# Patient Record
Sex: MALE
[De-identification: names, ages, dates, MRNs, and addresses within clinical notes are randomized per-mention and may not be internally consistent; named-entity substitution may affect disease eponyms.]

---

## 2017-02-26 ENCOUNTER — RECORDS - HEALTHEAST (OUTPATIENT)
Dept: ADMINISTRATIVE | Facility: OTHER | Age: 67
End: 2017-02-26

## 2017-02-27 ENCOUNTER — RECORDS - HEALTHEAST (OUTPATIENT)
Dept: ADMINISTRATIVE | Facility: OTHER | Age: 67
End: 2017-02-27

## 2017-09-14 ENCOUNTER — OFFICE VISIT - HEALTHEAST (OUTPATIENT)
Dept: FAMILY MEDICINE | Facility: CLINIC | Age: 67
End: 2017-09-14

## 2017-09-14 DIAGNOSIS — N40.0 BPH (BENIGN PROSTATIC HYPERTROPHY): ICD-10-CM

## 2017-09-14 DIAGNOSIS — I10 ESSENTIAL HYPERTENSION: ICD-10-CM

## 2017-09-14 DIAGNOSIS — Z12.11 SCREEN FOR COLON CANCER: ICD-10-CM

## 2017-09-14 ASSESSMENT — MIFFLIN-ST. JEOR: SCORE: 1755.56

## 2017-11-03 ENCOUNTER — COMMUNICATION - HEALTHEAST (OUTPATIENT)
Dept: FAMILY MEDICINE | Facility: CLINIC | Age: 67
End: 2017-11-03

## 2017-11-03 DIAGNOSIS — I10 ESSENTIAL HYPERTENSION WITH GOAL BLOOD PRESSURE LESS THAN 140/90: ICD-10-CM

## 2017-11-03 DIAGNOSIS — R00.0 TACHYCARDIA: ICD-10-CM

## 2018-01-02 ENCOUNTER — RECORDS - HEALTHEAST (OUTPATIENT)
Dept: ADMINISTRATIVE | Facility: OTHER | Age: 68
End: 2018-01-02

## 2018-01-04 ENCOUNTER — RECORDS - HEALTHEAST (OUTPATIENT)
Dept: ADMINISTRATIVE | Facility: OTHER | Age: 68
End: 2018-01-04

## 2018-01-16 ENCOUNTER — COMMUNICATION - HEALTHEAST (OUTPATIENT)
Dept: FAMILY MEDICINE | Facility: CLINIC | Age: 68
End: 2018-01-16

## 2018-01-16 DIAGNOSIS — R00.0 TACHYCARDIA: ICD-10-CM

## 2018-01-16 DIAGNOSIS — I10 ESSENTIAL HYPERTENSION WITH GOAL BLOOD PRESSURE LESS THAN 140/90: ICD-10-CM

## 2018-03-02 ENCOUNTER — RECORDS - HEALTHEAST (OUTPATIENT)
Dept: ADMINISTRATIVE | Facility: OTHER | Age: 68
End: 2018-03-02

## 2018-03-29 ENCOUNTER — AMBULATORY - HEALTHEAST (OUTPATIENT)
Dept: CARE COORDINATION | Facility: CLINIC | Age: 68
End: 2018-03-29

## 2018-03-29 ENCOUNTER — OFFICE VISIT - HEALTHEAST (OUTPATIENT)
Dept: FAMILY MEDICINE | Facility: CLINIC | Age: 68
End: 2018-03-29

## 2018-03-29 ENCOUNTER — COMMUNICATION - HEALTHEAST (OUTPATIENT)
Dept: FAMILY MEDICINE | Facility: CLINIC | Age: 68
End: 2018-03-29

## 2018-03-29 DIAGNOSIS — I10 ESSENTIAL HYPERTENSION WITH GOAL BLOOD PRESSURE LESS THAN 140/90: ICD-10-CM

## 2018-03-29 DIAGNOSIS — L89.90 PRESSURE ULCER: ICD-10-CM

## 2018-03-29 DIAGNOSIS — T48.5X5A RHINITIS MEDICAMENTOSA: ICD-10-CM

## 2018-03-29 DIAGNOSIS — R00.0 TACHYCARDIA: ICD-10-CM

## 2018-03-29 DIAGNOSIS — J31.0 RHINITIS MEDICAMENTOSA: ICD-10-CM

## 2018-03-29 DIAGNOSIS — F43.9 STRESS AT HOME: ICD-10-CM

## 2018-03-29 DIAGNOSIS — I10 ESSENTIAL HYPERTENSION: ICD-10-CM

## 2018-03-29 DIAGNOSIS — N39.0 UTI (URINARY TRACT INFECTION): ICD-10-CM

## 2018-03-29 LAB
ALBUMIN UR-MCNC: ABNORMAL MG/DL
APPEARANCE UR: ABNORMAL
BILIRUB UR QL STRIP: NEGATIVE
COLOR UR AUTO: YELLOW
GLUCOSE UR STRIP-MCNC: NEGATIVE MG/DL
HGB UR QL STRIP: ABNORMAL
KETONES UR STRIP-MCNC: NEGATIVE MG/DL
LEUKOCYTE ESTERASE UR QL STRIP: ABNORMAL
NITRATE UR QL: NEGATIVE
PH UR STRIP: 6 [PH] (ref 5–8)
SP GR UR STRIP: 1.02 (ref 1–1.03)
UROBILINOGEN UR STRIP-ACNC: ABNORMAL

## 2018-04-13 ENCOUNTER — COMMUNICATION - HEALTHEAST (OUTPATIENT)
Dept: FAMILY MEDICINE | Facility: CLINIC | Age: 68
End: 2018-04-13

## 2018-04-17 ENCOUNTER — COMMUNICATION - HEALTHEAST (OUTPATIENT)
Dept: FAMILY MEDICINE | Facility: CLINIC | Age: 68
End: 2018-04-17

## 2018-04-17 ENCOUNTER — OFFICE VISIT - HEALTHEAST (OUTPATIENT)
Dept: FAMILY MEDICINE | Facility: CLINIC | Age: 68
End: 2018-04-17

## 2018-04-17 DIAGNOSIS — R35.0 FREQUENCY OF URINATION: ICD-10-CM

## 2018-04-17 DIAGNOSIS — N39.0 UTI (URINARY TRACT INFECTION): ICD-10-CM

## 2018-04-17 LAB
ALBUMIN UR-MCNC: ABNORMAL MG/DL
APPEARANCE UR: CLEAR
BILIRUB UR QL STRIP: NEGATIVE
COLOR UR AUTO: YELLOW
GLUCOSE UR STRIP-MCNC: NEGATIVE MG/DL
HGB UR QL STRIP: NEGATIVE
KETONES UR STRIP-MCNC: NEGATIVE MG/DL
LEUKOCYTE ESTERASE UR QL STRIP: NEGATIVE
NITRATE UR QL: NEGATIVE
PH UR STRIP: 6 [PH] (ref 5–8)
SP GR UR STRIP: >=1.03 (ref 1–1.03)
UROBILINOGEN UR STRIP-ACNC: ABNORMAL

## 2018-04-17 ASSESSMENT — MIFFLIN-ST. JEOR: SCORE: 1577.81

## 2018-05-25 ENCOUNTER — RECORDS - HEALTHEAST (OUTPATIENT)
Dept: ADMINISTRATIVE | Facility: OTHER | Age: 68
End: 2018-05-25

## 2018-06-01 ENCOUNTER — COMMUNICATION - HEALTHEAST (OUTPATIENT)
Dept: FAMILY MEDICINE | Facility: CLINIC | Age: 68
End: 2018-06-01

## 2018-06-01 DIAGNOSIS — R00.0 TACHYCARDIA: ICD-10-CM

## 2018-06-01 DIAGNOSIS — I10 ESSENTIAL HYPERTENSION WITH GOAL BLOOD PRESSURE LESS THAN 140/90: ICD-10-CM

## 2018-06-14 ENCOUNTER — COMMUNICATION - HEALTHEAST (OUTPATIENT)
Dept: FAMILY MEDICINE | Facility: CLINIC | Age: 68
End: 2018-06-14

## 2018-08-31 ENCOUNTER — RECORDS - HEALTHEAST (OUTPATIENT)
Dept: ADMINISTRATIVE | Facility: OTHER | Age: 68
End: 2018-08-31

## 2018-12-11 ENCOUNTER — COMMUNICATION - HEALTHEAST (OUTPATIENT)
Dept: FAMILY MEDICINE | Facility: CLINIC | Age: 68
End: 2018-12-11

## 2018-12-12 ENCOUNTER — OFFICE VISIT - HEALTHEAST (OUTPATIENT)
Dept: FAMILY MEDICINE | Facility: CLINIC | Age: 68
End: 2018-12-12

## 2018-12-12 DIAGNOSIS — R82.90 FOUL SMELLING URINE: ICD-10-CM

## 2018-12-12 DIAGNOSIS — N30.01 ACUTE CYSTITIS WITH HEMATURIA: ICD-10-CM

## 2018-12-12 DIAGNOSIS — I10 ESSENTIAL HYPERTENSION: ICD-10-CM

## 2018-12-12 LAB
ALBUMIN UR-MCNC: ABNORMAL MG/DL
APPEARANCE UR: ABNORMAL
BILIRUB UR QL STRIP: NEGATIVE
COLOR UR AUTO: YELLOW
GLUCOSE UR STRIP-MCNC: NEGATIVE MG/DL
HGB UR QL STRIP: ABNORMAL
KETONES UR STRIP-MCNC: ABNORMAL MG/DL
LEUKOCYTE ESTERASE UR QL STRIP: ABNORMAL
NITRATE UR QL: NEGATIVE
PH UR STRIP: 7.5 [PH] (ref 5–8)
SP GR UR STRIP: 1.02 (ref 1–1.03)
UROBILINOGEN UR STRIP-ACNC: ABNORMAL

## 2018-12-15 LAB — BACTERIA SPEC CULT: NORMAL

## 2018-12-18 ENCOUNTER — COMMUNICATION - HEALTHEAST (OUTPATIENT)
Dept: LAB | Facility: CLINIC | Age: 68
End: 2018-12-18

## 2019-01-03 ENCOUNTER — COMMUNICATION - HEALTHEAST (OUTPATIENT)
Dept: FAMILY MEDICINE | Facility: CLINIC | Age: 69
End: 2019-01-03

## 2019-01-04 ENCOUNTER — COMMUNICATION - HEALTHEAST (OUTPATIENT)
Dept: FAMILY MEDICINE | Facility: CLINIC | Age: 69
End: 2019-01-04

## 2019-02-09 ENCOUNTER — COMMUNICATION - HEALTHEAST (OUTPATIENT)
Dept: FAMILY MEDICINE | Facility: CLINIC | Age: 69
End: 2019-02-09

## 2019-03-08 ENCOUNTER — COMMUNICATION - HEALTHEAST (OUTPATIENT)
Dept: SCHEDULING | Facility: CLINIC | Age: 69
End: 2019-03-08

## 2019-05-13 ENCOUNTER — COMMUNICATION - HEALTHEAST (OUTPATIENT)
Dept: SCHEDULING | Facility: CLINIC | Age: 69
End: 2019-05-13

## 2019-09-03 ENCOUNTER — OFFICE VISIT - HEALTHEAST (OUTPATIENT)
Dept: GERIATRICS | Facility: CLINIC | Age: 69
End: 2019-09-03

## 2019-09-03 DIAGNOSIS — F41.9 ANXIETY: ICD-10-CM

## 2019-09-03 DIAGNOSIS — N13.9 URINARY OBSTRUCTION: ICD-10-CM

## 2019-09-03 DIAGNOSIS — G89.29 CHRONIC PAIN OF RIGHT KNEE: ICD-10-CM

## 2019-09-03 DIAGNOSIS — M48.061 SPINAL STENOSIS OF LUMBAR REGION WITHOUT NEUROGENIC CLAUDICATION: ICD-10-CM

## 2019-09-03 DIAGNOSIS — R33.9 URINARY RETENTION: ICD-10-CM

## 2019-09-03 DIAGNOSIS — N30.00 ACUTE CYSTITIS WITHOUT HEMATURIA: ICD-10-CM

## 2019-09-03 DIAGNOSIS — M25.561 CHRONIC PAIN OF RIGHT KNEE: ICD-10-CM

## 2019-09-04 ENCOUNTER — OFFICE VISIT - HEALTHEAST (OUTPATIENT)
Dept: GERIATRICS | Facility: CLINIC | Age: 69
End: 2019-09-04

## 2019-09-04 DIAGNOSIS — M25.559 PAIN IN JOINT INVOLVING PELVIC REGION AND THIGH, UNSPECIFIED LATERALITY: ICD-10-CM

## 2019-09-04 DIAGNOSIS — N13.9 URINARY OBSTRUCTION: ICD-10-CM

## 2019-09-05 ENCOUNTER — OFFICE VISIT - HEALTHEAST (OUTPATIENT)
Dept: GERIATRICS | Facility: CLINIC | Age: 69
End: 2019-09-05

## 2019-09-05 ENCOUNTER — RECORDS - HEALTHEAST (OUTPATIENT)
Dept: LAB | Facility: CLINIC | Age: 69
End: 2019-09-05

## 2019-09-05 DIAGNOSIS — N13.9 URINARY OBSTRUCTION: ICD-10-CM

## 2019-09-05 DIAGNOSIS — R33.9 URINARY RETENTION: ICD-10-CM

## 2019-09-05 DIAGNOSIS — M48.00 SPINAL STENOSIS, UNSPECIFIED SPINAL REGION: ICD-10-CM

## 2019-09-06 ENCOUNTER — COMMUNICATION - HEALTHEAST (OUTPATIENT)
Dept: GERIATRICS | Facility: CLINIC | Age: 69
End: 2019-09-06

## 2019-09-06 LAB
ANION GAP SERPL CALCULATED.3IONS-SCNC: 7 MMOL/L (ref 5–18)
BUN SERPL-MCNC: 19 MG/DL (ref 8–22)
CALCIUM SERPL-MCNC: 9.2 MG/DL (ref 8.5–10.5)
CHLORIDE BLD-SCNC: 105 MMOL/L (ref 98–107)
CO2 SERPL-SCNC: 26 MMOL/L (ref 22–31)
CREAT SERPL-MCNC: 0.64 MG/DL (ref 0.7–1.3)
ERYTHROCYTE [DISTWIDTH] IN BLOOD BY AUTOMATED COUNT: 15.3 % (ref 11–14.5)
GFR SERPL CREATININE-BSD FRML MDRD: >60 ML/MIN/1.73M2
GLUCOSE BLD-MCNC: 102 MG/DL (ref 70–125)
HCT VFR BLD AUTO: 34 % (ref 40–54)
HGB BLD-MCNC: 10.9 G/DL (ref 14–18)
MAGNESIUM SERPL-MCNC: 1.7 MG/DL (ref 1.8–2.6)
MCH RBC QN AUTO: 26.5 PG (ref 27–34)
MCHC RBC AUTO-ENTMCNC: 32.1 G/DL (ref 32–36)
MCV RBC AUTO: 83 FL (ref 80–100)
PLATELET # BLD AUTO: 244 THOU/UL (ref 140–440)
PMV BLD AUTO: 10.5 FL (ref 8.5–12.5)
POTASSIUM BLD-SCNC: 3.9 MMOL/L (ref 3.5–5)
RBC # BLD AUTO: 4.11 MILL/UL (ref 4.4–6.2)
SODIUM SERPL-SCNC: 138 MMOL/L (ref 136–145)
WBC: 8 THOU/UL (ref 4–11)

## 2019-09-06 RX ORDER — MAGNESIUM OXIDE 400 MG/1
400 TABLET ORAL 2 TIMES DAILY
Status: SHIPPED | COMMUNITY
Start: 2019-09-06

## 2019-09-10 ENCOUNTER — OFFICE VISIT - HEALTHEAST (OUTPATIENT)
Dept: GERIATRICS | Facility: CLINIC | Age: 69
End: 2019-09-10

## 2019-09-10 DIAGNOSIS — G89.29 CHRONIC PAIN OF RIGHT KNEE: ICD-10-CM

## 2019-09-10 DIAGNOSIS — M79.10 MYALGIA: ICD-10-CM

## 2019-09-10 DIAGNOSIS — M48.00 SPINAL STENOSIS, UNSPECIFIED SPINAL REGION: ICD-10-CM

## 2019-09-10 DIAGNOSIS — R33.9 URINARY RETENTION: ICD-10-CM

## 2019-09-10 DIAGNOSIS — M25.561 CHRONIC PAIN OF RIGHT KNEE: ICD-10-CM

## 2019-09-11 ENCOUNTER — RECORDS - HEALTHEAST (OUTPATIENT)
Dept: LAB | Facility: CLINIC | Age: 69
End: 2019-09-11

## 2019-09-11 LAB
BACTERIA #/AREA URNS HPF: ABNORMAL HPF
CK SERPL-CCNC: 129 U/L (ref 30–190)
CREAT SERPL-MCNC: 0.7 MG/DL (ref 0.7–1.3)
GFR SERPL CREATININE-BSD FRML MDRD: >60 ML/MIN/1.73M2
RBC #/AREA URNS AUTO: >100 HPF
SQUAMOUS #/AREA URNS AUTO: ABNORMAL LPF
WBC #/AREA URNS AUTO: ABNORMAL HPF

## 2019-09-12 ENCOUNTER — DOCUMENTATION ONLY (OUTPATIENT)
Dept: OTHER | Facility: CLINIC | Age: 69
End: 2019-09-12

## 2019-09-12 ENCOUNTER — AMBULATORY - HEALTHEAST (OUTPATIENT)
Dept: OTHER | Facility: CLINIC | Age: 69
End: 2019-09-12

## 2019-09-12 ENCOUNTER — OFFICE VISIT - HEALTHEAST (OUTPATIENT)
Dept: GERIATRICS | Facility: CLINIC | Age: 69
End: 2019-09-12

## 2019-09-12 DIAGNOSIS — M79.10 MYALGIA: ICD-10-CM

## 2019-09-12 DIAGNOSIS — M48.00 SPINAL STENOSIS, UNSPECIFIED SPINAL REGION: ICD-10-CM

## 2019-09-12 DIAGNOSIS — R33.9 URINARY RETENTION: ICD-10-CM

## 2019-09-12 DIAGNOSIS — M25.559 PAIN IN JOINT INVOLVING PELVIC REGION AND THIGH, UNSPECIFIED LATERALITY: ICD-10-CM

## 2019-09-12 DIAGNOSIS — M25.561 CHRONIC PAIN OF RIGHT KNEE: ICD-10-CM

## 2019-09-12 DIAGNOSIS — G89.29 CHRONIC PAIN OF RIGHT KNEE: ICD-10-CM

## 2019-09-12 DIAGNOSIS — F41.9 ANXIETY: ICD-10-CM

## 2019-09-12 LAB — BACTERIA SPEC CULT: ABNORMAL

## 2019-09-17 ENCOUNTER — OFFICE VISIT - HEALTHEAST (OUTPATIENT)
Dept: GERIATRICS | Facility: CLINIC | Age: 69
End: 2019-09-17

## 2019-09-17 DIAGNOSIS — A41.9 SEPSIS DUE TO URINARY TRACT INFECTION (H): ICD-10-CM

## 2019-09-17 DIAGNOSIS — R53.81 DEBILITATED PATIENT: ICD-10-CM

## 2019-09-17 DIAGNOSIS — R33.9 URINARY RETENTION: ICD-10-CM

## 2019-09-17 DIAGNOSIS — N39.0 SEPSIS DUE TO URINARY TRACT INFECTION (H): ICD-10-CM

## 2019-09-19 ENCOUNTER — OFFICE VISIT - HEALTHEAST (OUTPATIENT)
Dept: GERIATRICS | Facility: CLINIC | Age: 69
End: 2019-09-19

## 2019-09-19 DIAGNOSIS — M48.061 SPINAL STENOSIS OF LUMBAR REGION WITHOUT NEUROGENIC CLAUDICATION: ICD-10-CM

## 2019-09-19 DIAGNOSIS — R53.81 DEBILITATED PATIENT: ICD-10-CM

## 2019-09-19 DIAGNOSIS — M48.00 SPINAL STENOSIS, UNSPECIFIED SPINAL REGION: ICD-10-CM

## 2019-09-19 DIAGNOSIS — G89.29 CHRONIC PAIN OF RIGHT KNEE: ICD-10-CM

## 2019-09-19 DIAGNOSIS — M25.561 CHRONIC PAIN OF RIGHT KNEE: ICD-10-CM

## 2019-09-19 DIAGNOSIS — R33.9 URINARY RETENTION: ICD-10-CM

## 2019-09-20 ENCOUNTER — OFFICE VISIT - HEALTHEAST (OUTPATIENT)
Dept: GERIATRICS | Facility: CLINIC | Age: 69
End: 2019-09-20

## 2019-09-20 DIAGNOSIS — G47.00 INSOMNIA, UNSPECIFIED TYPE: ICD-10-CM

## 2019-09-20 DIAGNOSIS — R33.9 URINARY RETENTION: ICD-10-CM

## 2019-09-25 ENCOUNTER — OFFICE VISIT - HEALTHEAST (OUTPATIENT)
Dept: GERIATRICS | Facility: CLINIC | Age: 69
End: 2019-09-25

## 2019-09-25 DIAGNOSIS — R33.9 URINARY RETENTION: ICD-10-CM

## 2019-09-25 DIAGNOSIS — F41.9 ANXIETY: ICD-10-CM

## 2019-09-25 DIAGNOSIS — R15.1 FECAL SMEARING: ICD-10-CM

## 2019-09-30 ENCOUNTER — RECORDS - HEALTHEAST (OUTPATIENT)
Dept: LAB | Facility: CLINIC | Age: 69
End: 2019-09-30

## 2019-10-01 ENCOUNTER — OFFICE VISIT - HEALTHEAST (OUTPATIENT)
Dept: GERIATRICS | Facility: CLINIC | Age: 69
End: 2019-10-01

## 2019-10-01 DIAGNOSIS — R63.5 WEIGHT GAIN: ICD-10-CM

## 2019-10-01 DIAGNOSIS — R33.9 URINARY RETENTION: ICD-10-CM

## 2019-10-01 LAB
ANION GAP SERPL CALCULATED.3IONS-SCNC: 8 MMOL/L (ref 5–18)
BUN SERPL-MCNC: 31 MG/DL (ref 8–22)
CALCIUM SERPL-MCNC: 9.2 MG/DL (ref 8.5–10.5)
CHLORIDE BLD-SCNC: 105 MMOL/L (ref 98–107)
CO2 SERPL-SCNC: 26 MMOL/L (ref 22–31)
CREAT SERPL-MCNC: 0.71 MG/DL (ref 0.7–1.3)
GFR SERPL CREATININE-BSD FRML MDRD: >60 ML/MIN/1.73M2
GLUCOSE BLD-MCNC: 87 MG/DL (ref 70–125)
POTASSIUM BLD-SCNC: 4.4 MMOL/L (ref 3.5–5)
SODIUM SERPL-SCNC: 139 MMOL/L (ref 136–145)

## 2019-10-04 ENCOUNTER — OFFICE VISIT - HEALTHEAST (OUTPATIENT)
Dept: GERIATRICS | Facility: CLINIC | Age: 69
End: 2019-10-04

## 2019-10-04 DIAGNOSIS — R33.9 URINARY RETENTION: ICD-10-CM

## 2019-10-04 DIAGNOSIS — R53.81 DEBILITATED PATIENT: ICD-10-CM

## 2019-10-08 ENCOUNTER — OFFICE VISIT - HEALTHEAST (OUTPATIENT)
Dept: GERIATRICS | Facility: CLINIC | Age: 69
End: 2019-10-08

## 2019-10-08 DIAGNOSIS — F41.9 ANXIETY: ICD-10-CM

## 2019-10-08 DIAGNOSIS — R33.9 URINARY RETENTION: ICD-10-CM

## 2019-10-09 ENCOUNTER — RECORDS - HEALTHEAST (OUTPATIENT)
Dept: LAB | Facility: CLINIC | Age: 69
End: 2019-10-09

## 2019-10-10 ENCOUNTER — OFFICE VISIT - HEALTHEAST (OUTPATIENT)
Dept: GERIATRICS | Facility: CLINIC | Age: 69
End: 2019-10-10

## 2019-10-10 DIAGNOSIS — F41.9 ANXIETY: ICD-10-CM

## 2019-10-10 DIAGNOSIS — M48.00 SPINAL STENOSIS, UNSPECIFIED SPINAL REGION: ICD-10-CM

## 2019-10-10 DIAGNOSIS — R33.9 URINARY RETENTION: ICD-10-CM

## 2019-10-10 LAB
ANION GAP SERPL CALCULATED.3IONS-SCNC: 5 MMOL/L (ref 5–18)
BNP SERPL-MCNC: 35 PG/ML (ref 0–62)
BUN SERPL-MCNC: 29 MG/DL (ref 8–22)
CALCIUM SERPL-MCNC: 8.9 MG/DL (ref 8.5–10.5)
CHLORIDE BLD-SCNC: 109 MMOL/L (ref 98–107)
CO2 SERPL-SCNC: 25 MMOL/L (ref 22–31)
CREAT SERPL-MCNC: 0.65 MG/DL (ref 0.7–1.3)
ERYTHROCYTE [DISTWIDTH] IN BLOOD BY AUTOMATED COUNT: 16.2 % (ref 11–14.5)
GFR SERPL CREATININE-BSD FRML MDRD: >60 ML/MIN/1.73M2
GLUCOSE BLD-MCNC: 97 MG/DL (ref 70–125)
HCT VFR BLD AUTO: 28.8 % (ref 40–54)
HGB BLD-MCNC: 8.9 G/DL (ref 14–18)
MCH RBC QN AUTO: 26.3 PG (ref 27–34)
MCHC RBC AUTO-ENTMCNC: 30.9 G/DL (ref 32–36)
MCV RBC AUTO: 85 FL (ref 80–100)
PLATELET # BLD AUTO: 206 THOU/UL (ref 140–440)
PMV BLD AUTO: 10.6 FL (ref 8.5–12.5)
POTASSIUM BLD-SCNC: 4.5 MMOL/L (ref 3.5–5)
RBC # BLD AUTO: 3.38 MILL/UL (ref 4.4–6.2)
SODIUM SERPL-SCNC: 139 MMOL/L (ref 136–145)
WBC: 8.6 THOU/UL (ref 4–11)

## 2019-10-10 RX ORDER — TAMSULOSIN HYDROCHLORIDE 0.4 MG/1
0.4 CAPSULE ORAL
Status: SHIPPED | COMMUNITY
Start: 2019-10-10

## 2019-10-14 ENCOUNTER — OFFICE VISIT - HEALTHEAST (OUTPATIENT)
Dept: GERIATRICS | Facility: CLINIC | Age: 69
End: 2019-10-14

## 2019-10-14 DIAGNOSIS — I10 ESSENTIAL HYPERTENSION: ICD-10-CM

## 2019-10-14 DIAGNOSIS — Z91.199 H/O NONCOMPLIANCE WITH MEDICAL TREATMENT, PRESENTING HAZARDS TO HEALTH: ICD-10-CM

## 2019-10-14 DIAGNOSIS — R41.89 COGNITIVE IMPAIRMENT: ICD-10-CM

## 2019-10-14 DIAGNOSIS — J30.1 SEASONAL ALLERGIC RHINITIS DUE TO POLLEN: ICD-10-CM

## 2019-10-14 DIAGNOSIS — R63.5 WEIGHT GAIN: ICD-10-CM

## 2019-10-14 DIAGNOSIS — I89.0 LYMPHEDEMA: ICD-10-CM

## 2019-10-14 DIAGNOSIS — E87.79 OTHER HYPERVOLEMIA: ICD-10-CM

## 2019-10-14 DIAGNOSIS — I21.4 NON-STEMI (NON-ST ELEVATED MYOCARDIAL INFARCTION) (H): ICD-10-CM

## 2019-10-14 DIAGNOSIS — R62.7 FAILURE TO THRIVE IN ADULT: ICD-10-CM

## 2019-10-14 DIAGNOSIS — R33.8 BENIGN PROSTATIC HYPERPLASIA WITH URINARY RETENTION: ICD-10-CM

## 2019-10-14 DIAGNOSIS — R33.9 URINARY RETENTION: ICD-10-CM

## 2019-10-14 DIAGNOSIS — N40.1 BENIGN PROSTATIC HYPERPLASIA WITH URINARY RETENTION: ICD-10-CM

## 2019-10-14 DIAGNOSIS — I25.10 CORONARY ARTERY DISEASE INVOLVING NATIVE CORONARY ARTERY OF NATIVE HEART WITHOUT ANGINA PECTORIS: ICD-10-CM

## 2019-10-14 DIAGNOSIS — F41.9 ANXIETY: ICD-10-CM

## 2019-10-16 ENCOUNTER — OFFICE VISIT - HEALTHEAST (OUTPATIENT)
Dept: GERIATRICS | Facility: CLINIC | Age: 69
End: 2019-10-16

## 2019-10-16 ENCOUNTER — RECORDS - HEALTHEAST (OUTPATIENT)
Dept: LAB | Facility: CLINIC | Age: 69
End: 2019-10-16

## 2019-10-16 DIAGNOSIS — I89.0 LYMPHEDEMA: ICD-10-CM

## 2019-10-16 DIAGNOSIS — T48.5X5A RHINITIS MEDICAMENTOSA: ICD-10-CM

## 2019-10-16 DIAGNOSIS — J31.0 RHINITIS MEDICAMENTOSA: ICD-10-CM

## 2019-10-16 DIAGNOSIS — G47.00 INSOMNIA, UNSPECIFIED TYPE: ICD-10-CM

## 2019-10-16 DIAGNOSIS — R62.7 FAILURE TO THRIVE IN ADULT: ICD-10-CM

## 2019-10-16 DIAGNOSIS — R41.89 COGNITIVE IMPAIRMENT: ICD-10-CM

## 2019-10-17 ENCOUNTER — OFFICE VISIT - HEALTHEAST (OUTPATIENT)
Dept: GERIATRICS | Facility: CLINIC | Age: 69
End: 2019-10-17

## 2019-10-17 DIAGNOSIS — R53.81 DEBILITATED PATIENT: ICD-10-CM

## 2019-10-17 DIAGNOSIS — N13.9 URINARY OBSTRUCTION: ICD-10-CM

## 2019-10-17 DIAGNOSIS — R33.9 URINARY RETENTION: ICD-10-CM

## 2019-10-17 LAB
ANION GAP SERPL CALCULATED.3IONS-SCNC: 5 MMOL/L (ref 5–18)
BUN SERPL-MCNC: 34 MG/DL (ref 8–22)
CALCIUM SERPL-MCNC: 8.9 MG/DL (ref 8.5–10.5)
CHLORIDE BLD-SCNC: 106 MMOL/L (ref 98–107)
CO2 SERPL-SCNC: 29 MMOL/L (ref 22–31)
CREAT SERPL-MCNC: 0.69 MG/DL (ref 0.7–1.3)
GFR SERPL CREATININE-BSD FRML MDRD: >60 ML/MIN/1.73M2
GLUCOSE BLD-MCNC: 97 MG/DL (ref 70–125)
POTASSIUM BLD-SCNC: 4.6 MMOL/L (ref 3.5–5)
SODIUM SERPL-SCNC: 140 MMOL/L (ref 136–145)

## 2019-10-21 ENCOUNTER — OFFICE VISIT - HEALTHEAST (OUTPATIENT)
Dept: GERIATRICS | Facility: CLINIC | Age: 69
End: 2019-10-21

## 2019-10-21 DIAGNOSIS — R62.7 FAILURE TO THRIVE IN ADULT: ICD-10-CM

## 2019-10-21 DIAGNOSIS — I89.0 LYMPHEDEMA: ICD-10-CM

## 2019-10-21 DIAGNOSIS — R41.89 COGNITIVE IMPAIRMENT: ICD-10-CM

## 2019-10-24 ENCOUNTER — OFFICE VISIT - HEALTHEAST (OUTPATIENT)
Dept: GERIATRICS | Facility: CLINIC | Age: 69
End: 2019-10-24

## 2019-10-24 DIAGNOSIS — I89.0 LYMPHEDEMA: ICD-10-CM

## 2019-10-24 DIAGNOSIS — R63.5 WEIGHT GAIN: ICD-10-CM

## 2019-10-24 DIAGNOSIS — R53.81 DEBILITATED PATIENT: ICD-10-CM

## 2019-10-24 DIAGNOSIS — R33.9 URINARY RETENTION: ICD-10-CM

## 2019-10-24 DIAGNOSIS — R62.7 FAILURE TO THRIVE IN ADULT: ICD-10-CM

## 2019-10-29 ENCOUNTER — OFFICE VISIT - HEALTHEAST (OUTPATIENT)
Dept: GERIATRICS | Facility: CLINIC | Age: 69
End: 2019-10-29

## 2019-10-29 DIAGNOSIS — G47.00 INSOMNIA, UNSPECIFIED TYPE: ICD-10-CM

## 2019-10-29 DIAGNOSIS — R53.81 DEBILITATED PATIENT: ICD-10-CM

## 2019-10-29 DIAGNOSIS — R33.9 URINARY RETENTION: ICD-10-CM

## 2019-11-01 ENCOUNTER — OFFICE VISIT - HEALTHEAST (OUTPATIENT)
Dept: GERIATRICS | Facility: CLINIC | Age: 69
End: 2019-11-01

## 2019-11-01 DIAGNOSIS — M79.89 LEFT ARM SWELLING: ICD-10-CM

## 2019-11-04 ENCOUNTER — RECORDS - HEALTHEAST (OUTPATIENT)
Dept: LAB | Facility: CLINIC | Age: 69
End: 2019-11-04

## 2019-11-04 ENCOUNTER — OFFICE VISIT - HEALTHEAST (OUTPATIENT)
Dept: GERIATRICS | Facility: CLINIC | Age: 69
End: 2019-11-04

## 2019-11-04 DIAGNOSIS — M79.89 LEFT ARM SWELLING: ICD-10-CM

## 2019-11-04 DIAGNOSIS — N30.00 ACUTE CYSTITIS WITHOUT HEMATURIA: ICD-10-CM

## 2019-11-05 LAB
ANION GAP SERPL CALCULATED.3IONS-SCNC: 5 MMOL/L (ref 5–18)
BUN SERPL-MCNC: 39 MG/DL (ref 8–22)
CALCIUM SERPL-MCNC: 8.9 MG/DL (ref 8.5–10.5)
CHLORIDE BLD-SCNC: 106 MMOL/L (ref 98–107)
CO2 SERPL-SCNC: 25 MMOL/L (ref 22–31)
CREAT SERPL-MCNC: 0.76 MG/DL (ref 0.7–1.3)
GFR SERPL CREATININE-BSD FRML MDRD: >60 ML/MIN/1.73M2
GLUCOSE BLD-MCNC: 90 MG/DL (ref 70–125)
POTASSIUM BLD-SCNC: 4.9 MMOL/L (ref 3.5–5)
SODIUM SERPL-SCNC: 136 MMOL/L (ref 136–145)

## 2019-11-07 ENCOUNTER — OFFICE VISIT - HEALTHEAST (OUTPATIENT)
Dept: GERIATRICS | Facility: CLINIC | Age: 69
End: 2019-11-07

## 2019-11-07 DIAGNOSIS — R62.7 FAILURE TO THRIVE IN ADULT: ICD-10-CM

## 2019-11-07 DIAGNOSIS — I89.0 LYMPHEDEMA: ICD-10-CM

## 2019-11-07 DIAGNOSIS — R63.5 WEIGHT GAIN: ICD-10-CM

## 2019-11-07 DIAGNOSIS — R33.9 URINARY RETENTION: ICD-10-CM

## 2019-11-07 DIAGNOSIS — M79.89 LEFT ARM SWELLING: ICD-10-CM

## 2019-11-07 DIAGNOSIS — F41.9 ANXIETY: ICD-10-CM

## 2019-11-07 DIAGNOSIS — R53.81 DEBILITATED PATIENT: ICD-10-CM

## 2019-11-14 ENCOUNTER — OFFICE VISIT - HEALTHEAST (OUTPATIENT)
Dept: GERIATRICS | Facility: CLINIC | Age: 69
End: 2019-11-14

## 2019-11-14 DIAGNOSIS — R53.81 DEBILITATED PATIENT: ICD-10-CM

## 2019-11-14 DIAGNOSIS — I89.0 LYMPHEDEMA: ICD-10-CM

## 2019-11-14 DIAGNOSIS — M79.89 LEFT ARM SWELLING: ICD-10-CM

## 2019-11-18 ENCOUNTER — OFFICE VISIT - HEALTHEAST (OUTPATIENT)
Dept: GERIATRICS | Facility: CLINIC | Age: 69
End: 2019-11-18

## 2019-11-18 DIAGNOSIS — M79.89 LEFT ARM SWELLING: ICD-10-CM

## 2019-11-18 DIAGNOSIS — R52 PAIN: ICD-10-CM

## 2019-11-19 ENCOUNTER — OFFICE VISIT - HEALTHEAST (OUTPATIENT)
Dept: GERIATRICS | Facility: CLINIC | Age: 69
End: 2019-11-19

## 2019-11-19 DIAGNOSIS — R62.7 FAILURE TO THRIVE IN ADULT: ICD-10-CM

## 2019-11-19 DIAGNOSIS — R63.5 WEIGHT GAIN: ICD-10-CM

## 2019-11-19 DIAGNOSIS — M79.89 LEFT ARM SWELLING: ICD-10-CM

## 2019-11-19 DIAGNOSIS — R33.9 URINARY RETENTION: ICD-10-CM

## 2019-11-19 DIAGNOSIS — I89.0 LYMPHEDEMA: ICD-10-CM

## 2019-11-19 DIAGNOSIS — F41.9 ANXIETY: ICD-10-CM

## 2019-11-20 ENCOUNTER — RECORDS - HEALTHEAST (OUTPATIENT)
Dept: LAB | Facility: CLINIC | Age: 69
End: 2019-11-20

## 2019-11-21 ENCOUNTER — COMMUNICATION - HEALTHEAST (OUTPATIENT)
Dept: GERIATRICS | Facility: CLINIC | Age: 69
End: 2019-11-21

## 2019-11-21 ENCOUNTER — OFFICE VISIT - HEALTHEAST (OUTPATIENT)
Dept: GERIATRICS | Facility: CLINIC | Age: 69
End: 2019-11-21

## 2019-11-21 DIAGNOSIS — M79.89 LEFT ARM SWELLING: ICD-10-CM

## 2019-11-21 DIAGNOSIS — R63.5 WEIGHT GAIN: ICD-10-CM

## 2019-11-21 DIAGNOSIS — R62.7 FAILURE TO THRIVE IN ADULT: ICD-10-CM

## 2019-11-21 DIAGNOSIS — I89.0 LYMPHEDEMA: ICD-10-CM

## 2019-11-21 DIAGNOSIS — R33.9 URINARY RETENTION: ICD-10-CM

## 2019-11-21 LAB
ANION GAP SERPL CALCULATED.3IONS-SCNC: 9 MMOL/L (ref 5–18)
BUN SERPL-MCNC: 43 MG/DL (ref 8–22)
CALCIUM SERPL-MCNC: 9.3 MG/DL (ref 8.5–10.5)
CHLORIDE BLD-SCNC: 105 MMOL/L (ref 98–107)
CO2 SERPL-SCNC: 24 MMOL/L (ref 22–31)
CREAT SERPL-MCNC: 0.95 MG/DL (ref 0.7–1.3)
GFR SERPL CREATININE-BSD FRML MDRD: >60 ML/MIN/1.73M2
GLUCOSE BLD-MCNC: 91 MG/DL (ref 70–125)
POTASSIUM BLD-SCNC: 5.3 MMOL/L (ref 3.5–5)
SODIUM SERPL-SCNC: 138 MMOL/L (ref 136–145)

## 2019-11-22 ENCOUNTER — OFFICE VISIT - HEALTHEAST (OUTPATIENT)
Dept: GERIATRICS | Facility: CLINIC | Age: 69
End: 2019-11-22

## 2019-11-22 ENCOUNTER — RECORDS - HEALTHEAST (OUTPATIENT)
Dept: ADMINISTRATIVE | Facility: OTHER | Age: 69
End: 2019-11-22

## 2019-11-22 DIAGNOSIS — S42.92XG: ICD-10-CM

## 2019-11-22 DIAGNOSIS — I89.0 LYMPHEDEMA OF LEFT ARM: ICD-10-CM

## 2019-11-25 ENCOUNTER — RECORDS - HEALTHEAST (OUTPATIENT)
Dept: LAB | Facility: CLINIC | Age: 69
End: 2019-11-25

## 2019-11-25 LAB — POTASSIUM BLD-SCNC: 4.6 MMOL/L (ref 3.5–5)

## 2019-11-26 ENCOUNTER — OFFICE VISIT - HEALTHEAST (OUTPATIENT)
Dept: GERIATRICS | Facility: CLINIC | Age: 69
End: 2019-11-26

## 2019-11-26 DIAGNOSIS — R53.81 DEBILITATED PATIENT: ICD-10-CM

## 2019-11-26 DIAGNOSIS — R33.9 URINARY RETENTION: ICD-10-CM

## 2019-12-02 ENCOUNTER — OFFICE VISIT - HEALTHEAST (OUTPATIENT)
Dept: GERIATRICS | Facility: CLINIC | Age: 69
End: 2019-12-02

## 2019-12-02 ENCOUNTER — RECORDS - HEALTHEAST (OUTPATIENT)
Dept: LAB | Facility: CLINIC | Age: 69
End: 2019-12-02

## 2019-12-02 DIAGNOSIS — R63.5 WEIGHT GAIN: ICD-10-CM

## 2019-12-02 DIAGNOSIS — M79.89 LEFT ARM SWELLING: ICD-10-CM

## 2019-12-03 ENCOUNTER — COMMUNICATION - HEALTHEAST (OUTPATIENT)
Dept: GERIATRICS | Facility: CLINIC | Age: 69
End: 2019-12-03

## 2019-12-03 ENCOUNTER — OFFICE VISIT - HEALTHEAST (OUTPATIENT)
Dept: GERIATRICS | Facility: CLINIC | Age: 69
End: 2019-12-03

## 2019-12-03 DIAGNOSIS — R63.5 WEIGHT GAIN: ICD-10-CM

## 2019-12-03 DIAGNOSIS — E87.79 OTHER HYPERVOLEMIA: ICD-10-CM

## 2019-12-03 LAB
ANION GAP SERPL CALCULATED.3IONS-SCNC: 8 MMOL/L (ref 5–18)
BUN SERPL-MCNC: 39 MG/DL (ref 8–22)
CALCIUM SERPL-MCNC: 9.3 MG/DL (ref 8.5–10.5)
CHLORIDE BLD-SCNC: 104 MMOL/L (ref 98–107)
CO2 SERPL-SCNC: 28 MMOL/L (ref 22–31)
CREAT SERPL-MCNC: 0.78 MG/DL (ref 0.7–1.3)
GFR SERPL CREATININE-BSD FRML MDRD: >60 ML/MIN/1.73M2
GLUCOSE BLD-MCNC: 95 MG/DL (ref 70–125)
POTASSIUM BLD-SCNC: 4.4 MMOL/L (ref 3.5–5)
SODIUM SERPL-SCNC: 140 MMOL/L (ref 136–145)

## 2019-12-05 ENCOUNTER — OFFICE VISIT - HEALTHEAST (OUTPATIENT)
Dept: GERIATRICS | Facility: CLINIC | Age: 69
End: 2019-12-05

## 2019-12-05 DIAGNOSIS — F41.9 ANXIETY: ICD-10-CM

## 2019-12-05 DIAGNOSIS — I89.0 LYMPHEDEMA: ICD-10-CM

## 2019-12-05 DIAGNOSIS — R62.7 FAILURE TO THRIVE IN ADULT: ICD-10-CM

## 2019-12-05 DIAGNOSIS — R53.81 DEBILITATED PATIENT: ICD-10-CM

## 2019-12-05 DIAGNOSIS — R41.89 COGNITIVE IMPAIRMENT: ICD-10-CM

## 2019-12-09 ENCOUNTER — COMMUNICATION - HEALTHEAST (OUTPATIENT)
Dept: GERIATRICS | Facility: CLINIC | Age: 69
End: 2019-12-09

## 2019-12-09 ENCOUNTER — RECORDS - HEALTHEAST (OUTPATIENT)
Dept: LAB | Facility: CLINIC | Age: 69
End: 2019-12-09

## 2019-12-09 ENCOUNTER — OFFICE VISIT - HEALTHEAST (OUTPATIENT)
Dept: GERIATRICS | Facility: CLINIC | Age: 69
End: 2019-12-09

## 2019-12-09 DIAGNOSIS — R53.81 DEBILITATED PATIENT: ICD-10-CM

## 2019-12-09 DIAGNOSIS — M79.89 LEFT ARM SWELLING: ICD-10-CM

## 2019-12-09 LAB
ANION GAP SERPL CALCULATED.3IONS-SCNC: 7 MMOL/L (ref 5–18)
BNP SERPL-MCNC: 63 PG/ML (ref 0–62)
BUN SERPL-MCNC: 50 MG/DL (ref 8–22)
CALCIUM SERPL-MCNC: 9.2 MG/DL (ref 8.5–10.5)
CHLORIDE BLD-SCNC: 104 MMOL/L (ref 98–107)
CO2 SERPL-SCNC: 32 MMOL/L (ref 22–31)
CREAT SERPL-MCNC: 0.89 MG/DL (ref 0.7–1.3)
D DIMER PPP FEU-MCNC: 3.67 FEU UG/ML
ERYTHROCYTE [DISTWIDTH] IN BLOOD BY AUTOMATED COUNT: 15.9 % (ref 11–14.5)
GFR SERPL CREATININE-BSD FRML MDRD: >60 ML/MIN/1.73M2
GLUCOSE BLD-MCNC: 88 MG/DL (ref 70–125)
HCT VFR BLD AUTO: 30.3 % (ref 40–54)
HGB BLD-MCNC: 9.4 G/DL (ref 14–18)
MCH RBC QN AUTO: 25.7 PG (ref 27–34)
MCHC RBC AUTO-ENTMCNC: 31 G/DL (ref 32–36)
MCV RBC AUTO: 83 FL (ref 80–100)
PLATELET # BLD AUTO: 199 THOU/UL (ref 140–440)
PMV BLD AUTO: 11.3 FL (ref 8.5–12.5)
POTASSIUM BLD-SCNC: 4.9 MMOL/L (ref 3.5–5)
RBC # BLD AUTO: 3.66 MILL/UL (ref 4.4–6.2)
SODIUM SERPL-SCNC: 143 MMOL/L (ref 136–145)
WBC: 5.6 THOU/UL (ref 4–11)

## 2019-12-23 ENCOUNTER — OFFICE VISIT - HEALTHEAST (OUTPATIENT)
Dept: GERIATRICS | Facility: CLINIC | Age: 69
End: 2019-12-23

## 2019-12-23 ENCOUNTER — COMMUNICATION - HEALTHEAST (OUTPATIENT)
Dept: ADMINISTRATIVE | Facility: CLINIC | Age: 69
End: 2019-12-23

## 2019-12-23 DIAGNOSIS — R53.81 DEBILITATED PATIENT: ICD-10-CM

## 2019-12-23 DIAGNOSIS — E87.79 OTHER HYPERVOLEMIA: ICD-10-CM

## 2019-12-23 DIAGNOSIS — R33.9 URINARY RETENTION: ICD-10-CM

## 2019-12-24 ENCOUNTER — OFFICE VISIT - HEALTHEAST (OUTPATIENT)
Dept: GERIATRICS | Facility: CLINIC | Age: 69
End: 2019-12-24

## 2019-12-24 DIAGNOSIS — R63.5 WEIGHT GAIN: ICD-10-CM

## 2019-12-24 DIAGNOSIS — R53.81 DEBILITATED PATIENT: ICD-10-CM

## 2019-12-24 DIAGNOSIS — I89.0 LYMPHEDEMA: ICD-10-CM

## 2019-12-24 DIAGNOSIS — R33.9 URINARY RETENTION: ICD-10-CM

## 2019-12-24 DIAGNOSIS — R62.7 FAILURE TO THRIVE IN ADULT: ICD-10-CM

## 2019-12-24 DIAGNOSIS — F41.9 ANXIETY: ICD-10-CM

## 2019-12-27 ENCOUNTER — COMMUNICATION - HEALTHEAST (OUTPATIENT)
Dept: CARDIOLOGY | Facility: CLINIC | Age: 69
End: 2019-12-27

## 2019-12-29 ENCOUNTER — RECORDS - HEALTHEAST (OUTPATIENT)
Dept: LAB | Facility: CLINIC | Age: 69
End: 2019-12-29

## 2019-12-30 ENCOUNTER — COMMUNICATION - HEALTHEAST (OUTPATIENT)
Dept: GERIATRICS | Facility: CLINIC | Age: 69
End: 2019-12-30

## 2019-12-30 ENCOUNTER — OFFICE VISIT - HEALTHEAST (OUTPATIENT)
Dept: GERIATRICS | Facility: CLINIC | Age: 69
End: 2019-12-30

## 2019-12-30 DIAGNOSIS — N40.1 BENIGN PROSTATIC HYPERPLASIA WITH URINARY RETENTION: ICD-10-CM

## 2019-12-30 DIAGNOSIS — R53.81 DEBILITATED PATIENT: ICD-10-CM

## 2019-12-30 DIAGNOSIS — R33.8 BENIGN PROSTATIC HYPERPLASIA WITH URINARY RETENTION: ICD-10-CM

## 2019-12-30 LAB
ANION GAP SERPL CALCULATED.3IONS-SCNC: 7 MMOL/L (ref 5–18)
BUN SERPL-MCNC: 21 MG/DL (ref 8–22)
CALCIUM SERPL-MCNC: 9.3 MG/DL (ref 8.5–10.5)
CHLORIDE BLD-SCNC: 100 MMOL/L (ref 98–107)
CO2 SERPL-SCNC: 30 MMOL/L (ref 22–31)
CREAT SERPL-MCNC: 0.6 MG/DL (ref 0.7–1.3)
ERYTHROCYTE [DISTWIDTH] IN BLOOD BY AUTOMATED COUNT: 15.9 % (ref 11–14.5)
GFR SERPL CREATININE-BSD FRML MDRD: >60 ML/MIN/1.73M2
GLUCOSE BLD-MCNC: 78 MG/DL (ref 70–125)
HCT VFR BLD AUTO: 31.2 % (ref 40–54)
HGB BLD-MCNC: 9.7 G/DL (ref 14–18)
MAGNESIUM SERPL-MCNC: 1.6 MG/DL (ref 1.8–2.6)
MCH RBC QN AUTO: 25.7 PG (ref 27–34)
MCHC RBC AUTO-ENTMCNC: 31.1 G/DL (ref 32–36)
MCV RBC AUTO: 83 FL (ref 80–100)
PLATELET # BLD AUTO: 318 THOU/UL (ref 140–440)
PMV BLD AUTO: 10.5 FL (ref 8.5–12.5)
POTASSIUM BLD-SCNC: 4 MMOL/L (ref 3.5–5)
RBC # BLD AUTO: 3.78 MILL/UL (ref 4.4–6.2)
SODIUM SERPL-SCNC: 137 MMOL/L (ref 136–145)
WBC: 7.8 THOU/UL (ref 4–11)

## 2020-01-02 ENCOUNTER — OFFICE VISIT - HEALTHEAST (OUTPATIENT)
Dept: CARDIOLOGY | Facility: CLINIC | Age: 70
End: 2020-01-02

## 2020-01-02 DIAGNOSIS — I50.31 ACUTE DIASTOLIC CHF (CONGESTIVE HEART FAILURE), NYHA CLASS 3 (H): ICD-10-CM

## 2020-01-02 DIAGNOSIS — I10 ESSENTIAL HYPERTENSION, BENIGN: ICD-10-CM

## 2020-01-02 ASSESSMENT — MIFFLIN-ST. JEOR: SCORE: 1698.86

## 2020-01-03 ENCOUNTER — OFFICE VISIT - HEALTHEAST (OUTPATIENT)
Dept: GERIATRICS | Facility: CLINIC | Age: 70
End: 2020-01-03

## 2020-01-03 DIAGNOSIS — I50.31 ACUTE DIASTOLIC CHF (CONGESTIVE HEART FAILURE), NYHA CLASS 3 (H): ICD-10-CM

## 2020-01-03 DIAGNOSIS — R33.9 URINARY RETENTION: ICD-10-CM

## 2020-01-05 ENCOUNTER — RECORDS - HEALTHEAST (OUTPATIENT)
Dept: LAB | Facility: CLINIC | Age: 70
End: 2020-01-05

## 2020-01-06 ENCOUNTER — OFFICE VISIT - HEALTHEAST (OUTPATIENT)
Dept: GERIATRICS | Facility: CLINIC | Age: 70
End: 2020-01-06

## 2020-01-06 ENCOUNTER — COMMUNICATION - HEALTHEAST (OUTPATIENT)
Dept: GERIATRICS | Facility: CLINIC | Age: 70
End: 2020-01-06

## 2020-01-06 DIAGNOSIS — H61.23 BILATERAL IMPACTED CERUMEN: ICD-10-CM

## 2020-01-06 DIAGNOSIS — I89.0 LYMPHEDEMA: ICD-10-CM

## 2020-01-06 LAB
ANION GAP SERPL CALCULATED.3IONS-SCNC: 7 MMOL/L (ref 5–18)
BASOPHILS # BLD AUTO: 0 THOU/UL (ref 0–0.2)
BASOPHILS NFR BLD AUTO: 1 % (ref 0–2)
BUN SERPL-MCNC: 24 MG/DL (ref 8–22)
CALCIUM SERPL-MCNC: 9.2 MG/DL (ref 8.5–10.5)
CHLORIDE BLD-SCNC: 101 MMOL/L (ref 98–107)
CO2 SERPL-SCNC: 31 MMOL/L (ref 22–31)
CREAT SERPL-MCNC: 0.65 MG/DL (ref 0.7–1.3)
EOSINOPHIL # BLD AUTO: 0.7 THOU/UL (ref 0–0.4)
EOSINOPHIL NFR BLD AUTO: 10 % (ref 0–6)
ERYTHROCYTE [DISTWIDTH] IN BLOOD BY AUTOMATED COUNT: 16 % (ref 11–14.5)
GFR SERPL CREATININE-BSD FRML MDRD: >60 ML/MIN/1.73M2
GLUCOSE BLD-MCNC: 104 MG/DL (ref 70–125)
HCT VFR BLD AUTO: 31.9 % (ref 40–54)
HGB BLD-MCNC: 9.8 G/DL (ref 14–18)
LYMPHOCYTES # BLD AUTO: 1.1 THOU/UL (ref 0.8–4.4)
LYMPHOCYTES NFR BLD AUTO: 16 % (ref 20–40)
MCH RBC QN AUTO: 25.2 PG (ref 27–34)
MCHC RBC AUTO-ENTMCNC: 30.7 G/DL (ref 32–36)
MCV RBC AUTO: 82 FL (ref 80–100)
MONOCYTES # BLD AUTO: 0.5 THOU/UL (ref 0–0.9)
MONOCYTES NFR BLD AUTO: 7 % (ref 2–10)
NEUTROPHILS # BLD AUTO: 4.7 THOU/UL (ref 2–7.7)
NEUTROPHILS NFR BLD AUTO: 66 % (ref 50–70)
PLATELET # BLD AUTO: 286 THOU/UL (ref 140–440)
PMV BLD AUTO: 10.1 FL (ref 8.5–12.5)
POTASSIUM BLD-SCNC: 4 MMOL/L (ref 3.5–5)
RBC # BLD AUTO: 3.89 MILL/UL (ref 4.4–6.2)
SODIUM SERPL-SCNC: 139 MMOL/L (ref 136–145)
WBC: 7.1 THOU/UL (ref 4–11)

## 2020-01-08 ENCOUNTER — OFFICE VISIT - HEALTHEAST (OUTPATIENT)
Dept: GERIATRICS | Facility: CLINIC | Age: 70
End: 2020-01-08

## 2020-01-08 DIAGNOSIS — I50.31 ACUTE DIASTOLIC CHF (CONGESTIVE HEART FAILURE), NYHA CLASS 3 (H): ICD-10-CM

## 2020-01-08 DIAGNOSIS — R63.5 WEIGHT GAIN: ICD-10-CM

## 2020-01-08 DIAGNOSIS — R53.81 DEBILITATED PATIENT: ICD-10-CM

## 2020-01-13 ENCOUNTER — OFFICE VISIT - HEALTHEAST (OUTPATIENT)
Dept: OTOLARYNGOLOGY | Facility: CLINIC | Age: 70
End: 2020-01-13

## 2020-01-13 ENCOUNTER — OFFICE VISIT - HEALTHEAST (OUTPATIENT)
Dept: GERIATRICS | Facility: CLINIC | Age: 70
End: 2020-01-13

## 2020-01-13 DIAGNOSIS — H61.23 BILATERAL IMPACTED CERUMEN: ICD-10-CM

## 2020-01-13 DIAGNOSIS — R53.81 DEBILITATED PATIENT: ICD-10-CM

## 2020-01-13 DIAGNOSIS — R33.9 URINARY RETENTION: ICD-10-CM

## 2020-01-14 ENCOUNTER — OFFICE VISIT - HEALTHEAST (OUTPATIENT)
Dept: GERIATRICS | Facility: CLINIC | Age: 70
End: 2020-01-14

## 2020-01-14 DIAGNOSIS — R53.81 DEBILITATED PATIENT: ICD-10-CM

## 2020-01-14 DIAGNOSIS — I50.31 ACUTE DIASTOLIC CHF (CONGESTIVE HEART FAILURE), NYHA CLASS 3 (H): ICD-10-CM

## 2020-01-14 DIAGNOSIS — R33.9 URINARY RETENTION: ICD-10-CM

## 2020-01-16 ENCOUNTER — AMBULATORY - HEALTHEAST (OUTPATIENT)
Dept: GERIATRICS | Facility: CLINIC | Age: 70
End: 2020-01-16

## 2020-02-17 ENCOUNTER — AMBULATORY - HEALTHEAST (OUTPATIENT)
Dept: OTHER | Facility: CLINIC | Age: 70
End: 2020-02-17

## 2020-02-17 ENCOUNTER — DOCUMENTATION ONLY (OUTPATIENT)
Dept: OTHER | Facility: CLINIC | Age: 70
End: 2020-02-17

## 2020-03-31 ENCOUNTER — RECORDS - HEALTHEAST (OUTPATIENT)
Dept: LAB | Facility: CLINIC | Age: 70
End: 2020-03-31

## 2020-04-01 LAB
ANION GAP SERPL CALCULATED.3IONS-SCNC: 8 MMOL/L (ref 5–18)
BUN SERPL-MCNC: 22 MG/DL (ref 8–22)
CALCIUM SERPL-MCNC: 9.4 MG/DL (ref 8.5–10.5)
CHLORIDE BLD-SCNC: 99 MMOL/L (ref 98–107)
CO2 SERPL-SCNC: 33 MMOL/L (ref 22–31)
CREAT SERPL-MCNC: 0.62 MG/DL (ref 0.7–1.3)
GFR SERPL CREATININE-BSD FRML MDRD: >60 ML/MIN/1.73M2
GLUCOSE BLD-MCNC: 82 MG/DL (ref 70–125)
POTASSIUM BLD-SCNC: 4.3 MMOL/L (ref 3.5–5)
SODIUM SERPL-SCNC: 140 MMOL/L (ref 136–145)

## 2020-04-22 ENCOUNTER — RECORDS - HEALTHEAST (OUTPATIENT)
Dept: LAB | Facility: CLINIC | Age: 70
End: 2020-04-22

## 2020-04-22 LAB
FLUAV AG SPEC QL IA: NORMAL
FLUBV AG SPEC QL IA: NORMAL

## 2020-04-23 ENCOUNTER — RECORDS - HEALTHEAST (OUTPATIENT)
Dept: LAB | Facility: CLINIC | Age: 70
End: 2020-04-23

## 2020-04-23 LAB
ANION GAP SERPL CALCULATED.3IONS-SCNC: 6 MMOL/L (ref 5–18)
BASOPHILS # BLD AUTO: 0 THOU/UL (ref 0–0.2)
BASOPHILS NFR BLD AUTO: 0 % (ref 0–2)
BNP SERPL-MCNC: 81 PG/ML (ref 0–64)
BUN SERPL-MCNC: 35 MG/DL (ref 8–22)
CALCIUM SERPL-MCNC: 9.4 MG/DL (ref 8.5–10.5)
CHLORIDE BLD-SCNC: 98 MMOL/L (ref 98–107)
CO2 SERPL-SCNC: 39 MMOL/L (ref 22–31)
CREAT SERPL-MCNC: 0.83 MG/DL (ref 0.7–1.3)
EOSINOPHIL # BLD AUTO: 0.3 THOU/UL (ref 0–0.4)
EOSINOPHIL NFR BLD AUTO: 3 % (ref 0–6)
ERYTHROCYTE [DISTWIDTH] IN BLOOD BY AUTOMATED COUNT: 21.6 % (ref 11–14.5)
GFR SERPL CREATININE-BSD FRML MDRD: >60 ML/MIN/1.73M2
GLUCOSE BLD-MCNC: 77 MG/DL (ref 70–125)
HCT VFR BLD AUTO: 31 % (ref 40–54)
HGB BLD-MCNC: 9 G/DL (ref 14–18)
LYMPHOCYTES # BLD AUTO: 0.6 THOU/UL (ref 0.8–4.4)
LYMPHOCYTES NFR BLD AUTO: 8 % (ref 20–40)
MCH RBC QN AUTO: 24.3 PG (ref 27–34)
MCHC RBC AUTO-ENTMCNC: 29 G/DL (ref 32–36)
MCV RBC AUTO: 84 FL (ref 80–100)
MONOCYTES # BLD AUTO: 0.8 THOU/UL (ref 0–0.9)
MONOCYTES NFR BLD AUTO: 10 % (ref 2–10)
NEUTROPHILS # BLD AUTO: 6.4 THOU/UL (ref 2–7.7)
NEUTROPHILS NFR BLD AUTO: 79 % (ref 50–70)
PLATELET # BLD AUTO: 116 THOU/UL (ref 140–440)
PMV BLD AUTO: 12.6 FL (ref 8.5–12.5)
POTASSIUM BLD-SCNC: 4.5 MMOL/L (ref 3.5–5)
PROCALCITONIN SERPL-MCNC: 0.05 NG/ML (ref 0–0.49)
RBC # BLD AUTO: 3.7 MILL/UL (ref 4.4–6.2)
SARS-COV-2 PCR COMMENT: NORMAL
SARS-COV-2 RNA SPEC QL NAA+PROBE: NEGATIVE
SARS-COV-2 VIRUS SPECIMEN SOURCE: NORMAL
SODIUM SERPL-SCNC: 143 MMOL/L (ref 136–145)
WBC: 8.2 THOU/UL (ref 4–11)

## 2020-04-24 ENCOUNTER — ANESTHESIA - HEALTHEAST (OUTPATIENT)
Dept: INTENSIVE CARE | Facility: CLINIC | Age: 70
End: 2020-04-24

## 2020-04-24 ENCOUNTER — RECORDS - HEALTHEAST (OUTPATIENT)
Dept: INTENSIVE CARE | Facility: CLINIC | Age: 70
End: 2020-04-24

## 2020-05-13 ENCOUNTER — AMBULATORY - HEALTHEAST (OUTPATIENT)
Dept: OTHER | Facility: CLINIC | Age: 70
End: 2020-05-13

## 2020-05-13 ENCOUNTER — DOCUMENTATION ONLY (OUTPATIENT)
Dept: OTHER | Facility: CLINIC | Age: 70
End: 2020-05-13

## 2020-05-19 ENCOUNTER — RECORDS - HEALTHEAST (OUTPATIENT)
Dept: LAB | Facility: CLINIC | Age: 70
End: 2020-05-19

## 2020-05-19 ENCOUNTER — COMMUNICATION - HEALTHEAST (OUTPATIENT)
Dept: ADMINISTRATIVE | Facility: CLINIC | Age: 70
End: 2020-05-19

## 2020-05-19 LAB
ANION GAP SERPL CALCULATED.3IONS-SCNC: 10 MMOL/L (ref 5–18)
BUN SERPL-MCNC: 26 MG/DL (ref 8–22)
CALCIUM SERPL-MCNC: 9.3 MG/DL (ref 8.5–10.5)
CHLORIDE BLD-SCNC: 95 MMOL/L (ref 98–107)
CO2 SERPL-SCNC: 35 MMOL/L (ref 22–31)
CREAT SERPL-MCNC: 0.72 MG/DL (ref 0.7–1.3)
ERYTHROCYTE [DISTWIDTH] IN BLOOD BY AUTOMATED COUNT: 20.9 % (ref 11–14.5)
GFR SERPL CREATININE-BSD FRML MDRD: >60 ML/MIN/1.73M2
GLUCOSE BLD-MCNC: 78 MG/DL (ref 70–125)
HCT VFR BLD AUTO: 34.8 % (ref 40–54)
HGB BLD-MCNC: 10.5 G/DL (ref 14–18)
IRON SATN MFR SERPL: 18 % (ref 20–50)
IRON SERPL-MCNC: 48 UG/DL (ref 42–175)
MAGNESIUM SERPL-MCNC: 1.9 MG/DL (ref 1.8–2.6)
MCH RBC QN AUTO: 24.8 PG (ref 27–34)
MCHC RBC AUTO-ENTMCNC: 30.2 G/DL (ref 32–36)
MCV RBC AUTO: 82 FL (ref 80–100)
PLATELET # BLD AUTO: 301 THOU/UL (ref 140–440)
PMV BLD AUTO: 10.7 FL (ref 8.5–12.5)
POTASSIUM BLD-SCNC: 3.6 MMOL/L (ref 3.5–5)
RBC # BLD AUTO: 4.23 MILL/UL (ref 4.4–6.2)
SODIUM SERPL-SCNC: 140 MMOL/L (ref 136–145)
TIBC SERPL-MCNC: 271 UG/DL (ref 313–563)
TRANSFERRIN SERPL-MCNC: 216 MG/DL (ref 212–360)
WBC: 10.6 THOU/UL (ref 4–11)

## 2020-07-07 ENCOUNTER — RECORDS - HEALTHEAST (OUTPATIENT)
Dept: LAB | Facility: CLINIC | Age: 70
End: 2020-07-07

## 2020-07-07 LAB
ALBUMIN UR-MCNC: ABNORMAL MG/DL
APPEARANCE UR: ABNORMAL
BACTERIA #/AREA URNS HPF: ABNORMAL HPF
BILIRUB UR QL STRIP: NEGATIVE
COLOR UR AUTO: YELLOW
GLUCOSE UR STRIP-MCNC: NEGATIVE MG/DL
HGB UR QL STRIP: ABNORMAL
KETONES UR STRIP-MCNC: NEGATIVE MG/DL
LEUKOCYTE ESTERASE UR QL STRIP: ABNORMAL
MUCOUS THREADS #/AREA URNS LPF: ABNORMAL LPF
NITRATE UR QL: POSITIVE
PH UR STRIP: 7 [PH] (ref 4.5–8)
RBC #/AREA URNS AUTO: >100 HPF
SP GR UR STRIP: 1.02 (ref 1–1.03)
SQUAMOUS #/AREA URNS AUTO: ABNORMAL LPF
UROBILINOGEN UR STRIP-ACNC: ABNORMAL
WBC #/AREA URNS AUTO: ABNORMAL HPF
WBC CLUMPS #/AREA URNS HPF: PRESENT /[HPF]

## 2020-07-10 LAB — BACTERIA SPEC CULT: ABNORMAL

## 2020-08-04 ENCOUNTER — OFFICE VISIT - HEALTHEAST (OUTPATIENT)
Dept: OTOLARYNGOLOGY | Facility: CLINIC | Age: 70
End: 2020-08-04

## 2020-08-04 ENCOUNTER — AMBULATORY - HEALTHEAST (OUTPATIENT)
Dept: OTOLARYNGOLOGY | Facility: CLINIC | Age: 70
End: 2020-08-04

## 2020-08-04 DIAGNOSIS — H61.23 BILATERAL IMPACTED CERUMEN: ICD-10-CM

## 2020-08-04 DIAGNOSIS — H93.19 TINNITUS, UNSPECIFIED LATERALITY: ICD-10-CM

## 2020-08-04 DIAGNOSIS — H93.12 TINNITUS, LEFT: ICD-10-CM

## 2020-09-30 ENCOUNTER — RECORDS - HEALTHEAST (OUTPATIENT)
Dept: LAB | Facility: CLINIC | Age: 70
End: 2020-09-30

## 2020-09-30 LAB
ALBUMIN UR-MCNC: ABNORMAL MG/DL
APPEARANCE UR: ABNORMAL
BACTERIA #/AREA URNS HPF: ABNORMAL HPF
BILIRUB UR QL STRIP: NEGATIVE
COLOR UR AUTO: YELLOW
GLUCOSE UR STRIP-MCNC: NEGATIVE MG/DL
HGB UR QL STRIP: ABNORMAL
HYALINE CASTS #/AREA URNS LPF: ABNORMAL LPF
KETONES UR STRIP-MCNC: NEGATIVE MG/DL
LEUKOCYTE ESTERASE UR QL STRIP: ABNORMAL
MUCOUS THREADS #/AREA URNS LPF: ABNORMAL LPF
NITRATE UR QL: NEGATIVE
PH UR STRIP: 8.5 [PH] (ref 4.5–8)
RBC #/AREA URNS AUTO: ABNORMAL HPF
SP GR UR STRIP: 1.02 (ref 1–1.03)
SQUAMOUS #/AREA URNS AUTO: ABNORMAL LPF
TRANS CELLS #/AREA URNS HPF: ABNORMAL LPF
UROBILINOGEN UR STRIP-ACNC: ABNORMAL
WBC #/AREA URNS AUTO: ABNORMAL HPF

## 2020-10-01 LAB — BACTERIA SPEC CULT: NORMAL

## 2020-10-15 ENCOUNTER — RECORDS - HEALTHEAST (OUTPATIENT)
Dept: LAB | Facility: CLINIC | Age: 70
End: 2020-10-15

## 2020-10-16 LAB
ANION GAP SERPL CALCULATED.3IONS-SCNC: 9 MMOL/L (ref 5–18)
BUN SERPL-MCNC: 28 MG/DL (ref 8–22)
CALCIUM SERPL-MCNC: 9 MG/DL (ref 8.5–10.5)
CHLORIDE BLD-SCNC: 97 MMOL/L (ref 98–107)
CO2 SERPL-SCNC: 35 MMOL/L (ref 22–31)
CREAT SERPL-MCNC: 0.73 MG/DL (ref 0.7–1.3)
GFR SERPL CREATININE-BSD FRML MDRD: >60 ML/MIN/1.73M2
GLUCOSE BLD-MCNC: 97 MG/DL (ref 70–125)
POTASSIUM BLD-SCNC: 4.2 MMOL/L (ref 3.5–5)
SODIUM SERPL-SCNC: 141 MMOL/L (ref 136–145)

## 2020-10-20 ENCOUNTER — RECORDS - HEALTHEAST (OUTPATIENT)
Dept: LAB | Facility: CLINIC | Age: 70
End: 2020-10-20

## 2020-10-21 LAB
ANION GAP SERPL CALCULATED.3IONS-SCNC: 12 MMOL/L (ref 5–18)
BUN SERPL-MCNC: 33 MG/DL (ref 8–22)
CALCIUM SERPL-MCNC: 9.4 MG/DL (ref 8.5–10.5)
CHLORIDE BLD-SCNC: 95 MMOL/L (ref 98–107)
CO2 SERPL-SCNC: 34 MMOL/L (ref 22–31)
CREAT SERPL-MCNC: 0.81 MG/DL (ref 0.7–1.3)
GFR SERPL CREATININE-BSD FRML MDRD: >60 ML/MIN/1.73M2
GLUCOSE BLD-MCNC: 126 MG/DL (ref 70–125)
POTASSIUM BLD-SCNC: 4.2 MMOL/L (ref 3.5–5)
SODIUM SERPL-SCNC: 141 MMOL/L (ref 136–145)

## 2020-10-30 ENCOUNTER — RECORDS - HEALTHEAST (OUTPATIENT)
Dept: LAB | Facility: CLINIC | Age: 70
End: 2020-10-30

## 2020-10-30 LAB
ALBUMIN UR-MCNC: ABNORMAL MG/DL
APPEARANCE UR: ABNORMAL
BACTERIA #/AREA URNS HPF: ABNORMAL HPF
BILIRUB UR QL STRIP: NEGATIVE
COLOR UR AUTO: ABNORMAL
GLUCOSE UR STRIP-MCNC: NEGATIVE MG/DL
HGB UR QL STRIP: ABNORMAL
KETONES UR STRIP-MCNC: NEGATIVE MG/DL
LEUKOCYTE ESTERASE UR QL STRIP: ABNORMAL
NITRATE UR QL: NEGATIVE
PH UR STRIP: 7.5 [PH] (ref 4.5–8)
RBC #/AREA URNS AUTO: >100 HPF
SP GR UR STRIP: 1.02 (ref 1–1.03)
SQUAMOUS #/AREA URNS AUTO: ABNORMAL LPF
UROBILINOGEN UR STRIP-ACNC: ABNORMAL
WBC #/AREA URNS AUTO: ABNORMAL HPF
WBC CLUMPS #/AREA URNS HPF: PRESENT /[HPF]

## 2020-10-31 ENCOUNTER — RECORDS - HEALTHEAST (OUTPATIENT)
Dept: LAB | Facility: CLINIC | Age: 70
End: 2020-10-31

## 2020-10-31 LAB
ANION GAP SERPL CALCULATED.3IONS-SCNC: 10 MMOL/L (ref 5–18)
BASOPHILS # BLD AUTO: 0 THOU/UL (ref 0–0.2)
BASOPHILS NFR BLD AUTO: 0 % (ref 0–2)
BUN SERPL-MCNC: 32 MG/DL (ref 8–22)
CALCIUM SERPL-MCNC: 9.4 MG/DL (ref 8.5–10.5)
CHLORIDE BLD-SCNC: 91 MMOL/L (ref 98–107)
CO2 SERPL-SCNC: 36 MMOL/L (ref 22–31)
CREAT SERPL-MCNC: 1.19 MG/DL (ref 0.7–1.3)
EOSINOPHIL # BLD AUTO: 0.2 THOU/UL (ref 0–0.4)
EOSINOPHIL NFR BLD AUTO: 2 % (ref 0–6)
ERYTHROCYTE [DISTWIDTH] IN BLOOD BY AUTOMATED COUNT: 17.6 % (ref 11–14.5)
GFR SERPL CREATININE-BSD FRML MDRD: >60 ML/MIN/1.73M2
GLUCOSE BLD-MCNC: 95 MG/DL (ref 70–125)
HCT VFR BLD AUTO: 39.4 % (ref 40–54)
HGB BLD-MCNC: 12 G/DL (ref 14–18)
IMM GRANULOCYTES # BLD: 0.1 THOU/UL
IMM GRANULOCYTES NFR BLD: 0 %
LYMPHOCYTES # BLD AUTO: 1.2 THOU/UL (ref 0.8–4.4)
LYMPHOCYTES NFR BLD AUTO: 10 % (ref 20–40)
MCH RBC QN AUTO: 24.6 PG (ref 27–34)
MCHC RBC AUTO-ENTMCNC: 30.5 G/DL (ref 32–36)
MCV RBC AUTO: 81 FL (ref 80–100)
MONOCYTES # BLD AUTO: 1 THOU/UL (ref 0–0.9)
MONOCYTES NFR BLD AUTO: 8 % (ref 2–10)
NEUTROPHILS # BLD AUTO: 10.4 THOU/UL (ref 2–7.7)
NEUTROPHILS NFR BLD AUTO: 81 % (ref 50–70)
PLATELET # BLD AUTO: 229 THOU/UL (ref 140–440)
PMV BLD AUTO: 10.6 FL (ref 8.5–12.5)
POTASSIUM BLD-SCNC: 3.8 MMOL/L (ref 3.5–5)
PSA SERPL-MCNC: 5.1 NG/ML (ref 0–4.5)
RBC # BLD AUTO: 4.87 MILL/UL (ref 4.4–6.2)
SODIUM SERPL-SCNC: 137 MMOL/L (ref 136–145)
WBC: 12.9 THOU/UL (ref 4–11)

## 2020-11-02 LAB
BACTERIA SPEC CULT: ABNORMAL
BACTERIA SPEC CULT: ABNORMAL

## 2020-12-26 ENCOUNTER — RECORDS - HEALTHEAST (OUTPATIENT)
Dept: LAB | Facility: CLINIC | Age: 70
End: 2020-12-26

## 2020-12-30 LAB
ANION GAP SERPL CALCULATED.3IONS-SCNC: 10 MMOL/L (ref 5–18)
BUN SERPL-MCNC: 33 MG/DL (ref 8–22)
CALCIUM SERPL-MCNC: 10 MG/DL (ref 8.5–10.5)
CHLORIDE BLD-SCNC: 91 MMOL/L (ref 98–107)
CO2 SERPL-SCNC: 39 MMOL/L (ref 22–31)
CREAT SERPL-MCNC: 0.91 MG/DL (ref 0.7–1.3)
GFR SERPL CREATININE-BSD FRML MDRD: >60 ML/MIN/1.73M2
GLUCOSE BLD-MCNC: 94 MG/DL (ref 70–125)
POTASSIUM BLD-SCNC: 3.8 MMOL/L (ref 3.5–5)
SODIUM SERPL-SCNC: 140 MMOL/L (ref 136–145)

## 2021-01-04 ENCOUNTER — RECORDS - HEALTHEAST (OUTPATIENT)
Dept: LAB | Facility: CLINIC | Age: 71
End: 2021-01-04

## 2021-01-04 LAB
ALBUMIN UR-MCNC: ABNORMAL MG/DL
APPEARANCE UR: ABNORMAL
BACTERIA #/AREA URNS HPF: ABNORMAL HPF
BILIRUB UR QL STRIP: NEGATIVE
COLOR UR AUTO: YELLOW
GLUCOSE UR STRIP-MCNC: NEGATIVE MG/DL
HGB UR QL STRIP: ABNORMAL
HYALINE CASTS #/AREA URNS LPF: ABNORMAL LPF
KETONES UR STRIP-MCNC: NEGATIVE MG/DL
LEUKOCYTE ESTERASE UR QL STRIP: ABNORMAL
NITRATE UR QL: NEGATIVE
PH UR STRIP: 5 [PH] (ref 4.5–8)
RBC #/AREA URNS AUTO: ABNORMAL HPF
SP GR UR STRIP: 1.02 (ref 1–1.03)
SQUAMOUS #/AREA URNS AUTO: ABNORMAL LPF
UROBILINOGEN UR STRIP-ACNC: ABNORMAL
WBC #/AREA URNS AUTO: ABNORMAL HPF
WBC CLUMPS #/AREA URNS HPF: PRESENT /[HPF]

## 2021-01-05 LAB — BACTERIA SPEC CULT: ABNORMAL

## 2021-01-11 ENCOUNTER — DOCUMENTATION ONLY (OUTPATIENT)
Dept: OTHER | Facility: CLINIC | Age: 71
End: 2021-01-11

## 2021-01-11 ENCOUNTER — AMBULATORY - HEALTHEAST (OUTPATIENT)
Dept: OTHER | Facility: CLINIC | Age: 71
End: 2021-01-11

## 2021-01-25 ENCOUNTER — RECORDS - HEALTHEAST (OUTPATIENT)
Dept: LAB | Facility: CLINIC | Age: 71
End: 2021-01-25

## 2021-01-27 LAB
ANION GAP SERPL CALCULATED.3IONS-SCNC: 9 MMOL/L (ref 5–18)
BUN SERPL-MCNC: 28 MG/DL (ref 8–22)
CALCIUM SERPL-MCNC: 9.4 MG/DL (ref 8.5–10.5)
CHLORIDE BLD-SCNC: 93 MMOL/L (ref 98–107)
CO2 SERPL-SCNC: 37 MMOL/L (ref 22–31)
CREAT SERPL-MCNC: 0.91 MG/DL (ref 0.7–1.3)
GFR SERPL CREATININE-BSD FRML MDRD: >60 ML/MIN/1.73M2
GLUCOSE BLD-MCNC: 78 MG/DL (ref 70–125)
POTASSIUM BLD-SCNC: 3.9 MMOL/L (ref 3.5–5)
SODIUM SERPL-SCNC: 139 MMOL/L (ref 136–145)

## 2021-01-28 ENCOUNTER — RECORDS - HEALTHEAST (OUTPATIENT)
Dept: LAB | Facility: CLINIC | Age: 71
End: 2021-01-28

## 2021-01-28 LAB
BASOPHILS # BLD AUTO: 0 THOU/UL (ref 0–0.2)
BASOPHILS NFR BLD AUTO: 0 % (ref 0–2)
EOSINOPHIL # BLD AUTO: 0.2 THOU/UL (ref 0–0.4)
EOSINOPHIL NFR BLD AUTO: 1 % (ref 0–6)
ERYTHROCYTE [DISTWIDTH] IN BLOOD BY AUTOMATED COUNT: 19 % (ref 11–14.5)
HCT VFR BLD AUTO: 36.6 % (ref 40–54)
HGB BLD-MCNC: 11.1 G/DL (ref 14–18)
IMM GRANULOCYTES # BLD: 0.1 THOU/UL
IMM GRANULOCYTES NFR BLD: 0 %
LYMPHOCYTES # BLD AUTO: 0.8 THOU/UL (ref 0.8–4.4)
LYMPHOCYTES NFR BLD AUTO: 7 % (ref 20–40)
MCH RBC QN AUTO: 25.1 PG (ref 27–34)
MCHC RBC AUTO-ENTMCNC: 30.3 G/DL (ref 32–36)
MCV RBC AUTO: 83 FL (ref 80–100)
MONOCYTES # BLD AUTO: 0.7 THOU/UL (ref 0–0.9)
MONOCYTES NFR BLD AUTO: 6 % (ref 2–10)
NEUTROPHILS # BLD AUTO: 9.5 THOU/UL (ref 2–7.7)
NEUTROPHILS NFR BLD AUTO: 85 % (ref 50–70)
PLATELET # BLD AUTO: 251 THOU/UL (ref 140–440)
PMV BLD AUTO: 12.1 FL (ref 8.5–12.5)
RBC # BLD AUTO: 4.42 MILL/UL (ref 4.4–6.2)
WBC: 11.3 THOU/UL (ref 4–11)

## 2021-02-02 ENCOUNTER — RECORDS - HEALTHEAST (OUTPATIENT)
Dept: LAB | Facility: CLINIC | Age: 71
End: 2021-02-02

## 2021-02-03 LAB
ANION GAP SERPL CALCULATED.3IONS-SCNC: 12 MMOL/L (ref 5–18)
BUN SERPL-MCNC: 23 MG/DL (ref 8–22)
CALCIUM SERPL-MCNC: 9 MG/DL (ref 8.5–10.5)
CHLORIDE BLD-SCNC: 94 MMOL/L (ref 98–107)
CO2 SERPL-SCNC: 36 MMOL/L (ref 22–31)
CREAT SERPL-MCNC: 0.96 MG/DL (ref 0.7–1.3)
GFR SERPL CREATININE-BSD FRML MDRD: >60 ML/MIN/1.73M2
GLUCOSE BLD-MCNC: 105 MG/DL (ref 70–125)
POTASSIUM BLD-SCNC: 3.6 MMOL/L (ref 3.5–5)
SODIUM SERPL-SCNC: 142 MMOL/L (ref 136–145)

## 2021-02-09 ENCOUNTER — RECORDS - HEALTHEAST (OUTPATIENT)
Dept: LAB | Facility: CLINIC | Age: 71
End: 2021-02-09

## 2021-02-10 LAB
ANION GAP SERPL CALCULATED.3IONS-SCNC: 9 MMOL/L (ref 5–18)
BUN SERPL-MCNC: 28 MG/DL (ref 8–22)
CALCIUM SERPL-MCNC: 9.6 MG/DL (ref 8.5–10.5)
CHLORIDE BLD-SCNC: 90 MMOL/L (ref 98–107)
CO2 SERPL-SCNC: 42 MMOL/L (ref 22–31)
CREAT SERPL-MCNC: 1.03 MG/DL (ref 0.7–1.3)
GFR SERPL CREATININE-BSD FRML MDRD: >60 ML/MIN/1.73M2
GLUCOSE BLD-MCNC: 74 MG/DL (ref 70–125)
POTASSIUM BLD-SCNC: 4 MMOL/L (ref 3.5–5)
SODIUM SERPL-SCNC: 141 MMOL/L (ref 136–145)

## 2021-02-15 ENCOUNTER — RECORDS - HEALTHEAST (OUTPATIENT)
Dept: LAB | Facility: CLINIC | Age: 71
End: 2021-02-15

## 2021-02-15 LAB
ANION GAP SERPL CALCULATED.3IONS-SCNC: 14 MMOL/L (ref 5–18)
BASOPHILS # BLD AUTO: 0 THOU/UL (ref 0–0.2)
BASOPHILS NFR BLD AUTO: 0 % (ref 0–2)
BUN SERPL-MCNC: 30 MG/DL (ref 8–22)
CALCIUM SERPL-MCNC: 9.5 MG/DL (ref 8.5–10.5)
CHLORIDE BLD-SCNC: 89 MMOL/L (ref 98–107)
CO2 SERPL-SCNC: 40 MMOL/L (ref 22–31)
CREAT SERPL-MCNC: 1.1 MG/DL (ref 0.7–1.3)
EOSINOPHIL # BLD AUTO: 0.2 THOU/UL (ref 0–0.4)
EOSINOPHIL NFR BLD AUTO: 1 % (ref 0–6)
ERYTHROCYTE [DISTWIDTH] IN BLOOD BY AUTOMATED COUNT: 19.2 % (ref 11–14.5)
GFR SERPL CREATININE-BSD FRML MDRD: >60 ML/MIN/1.73M2
GLUCOSE BLD-MCNC: 96 MG/DL (ref 70–125)
HCT VFR BLD AUTO: 37.6 % (ref 40–54)
HGB BLD-MCNC: 11.4 G/DL (ref 14–18)
IMM GRANULOCYTES # BLD: 0 THOU/UL
IMM GRANULOCYTES NFR BLD: 0 %
LYMPHOCYTES # BLD AUTO: 0.6 THOU/UL (ref 0.8–4.4)
LYMPHOCYTES NFR BLD AUTO: 5 % (ref 20–40)
MCH RBC QN AUTO: 25.3 PG (ref 27–34)
MCHC RBC AUTO-ENTMCNC: 30.3 G/DL (ref 32–36)
MCV RBC AUTO: 84 FL (ref 80–100)
MONOCYTES # BLD AUTO: 0.5 THOU/UL (ref 0–0.9)
MONOCYTES NFR BLD AUTO: 4 % (ref 2–10)
NEUTROPHILS # BLD AUTO: 12.5 THOU/UL (ref 2–7.7)
NEUTROPHILS NFR BLD AUTO: 90 % (ref 50–70)
PLATELET # BLD AUTO: 165 THOU/UL (ref 140–440)
PMV BLD AUTO: 11.6 FL (ref 8.5–12.5)
POTASSIUM BLD-SCNC: 4.6 MMOL/L (ref 3.5–5)
RBC # BLD AUTO: 4.5 MILL/UL (ref 4.4–6.2)
SODIUM SERPL-SCNC: 143 MMOL/L (ref 136–145)
WBC: 13.9 THOU/UL (ref 4–11)

## 2021-02-18 ENCOUNTER — COMMUNICATION - HEALTHEAST (OUTPATIENT)
Dept: SCHEDULING | Facility: CLINIC | Age: 71
End: 2021-02-18

## 2021-03-04 ENCOUNTER — DOCUMENTATION ONLY (OUTPATIENT)
Dept: OTHER | Facility: CLINIC | Age: 71
End: 2021-03-04

## 2021-03-04 ENCOUNTER — AMBULATORY - HEALTHEAST (OUTPATIENT)
Dept: OTHER | Facility: CLINIC | Age: 71
End: 2021-03-04

## 2021-03-09 ENCOUNTER — RECORDS - HEALTHEAST (OUTPATIENT)
Dept: ADMINISTRATIVE | Facility: OTHER | Age: 71
End: 2021-03-09

## 2021-03-11 ENCOUNTER — RECORDS - HEALTHEAST (OUTPATIENT)
Dept: ADMINISTRATIVE | Facility: OTHER | Age: 71
End: 2021-03-11

## 2021-05-26 VITALS — HEART RATE: 100 BPM | DIASTOLIC BLOOD PRESSURE: 70 MMHG | TEMPERATURE: 99 F | SYSTOLIC BLOOD PRESSURE: 140 MMHG

## 2021-05-28 NOTE — TELEPHONE ENCOUNTER
Call from pt       CC: suspected UTI - unable to come in for eval though       No access to transportation and some disability and family care giving needs       Sx started last night     Current sx   > Urgency   > Some burning w/ urination    > Slight blood      > No back pain   > No fever   > No swelling anywhere         A/P:   > I will send message and note that unable to come in - stay hydrated with plenty of water         Pt tele# [phone]   Confirmed pharmacy      Reason for Disposition    All other males with painful urination, or patient wants to be seen    Protocols used: URINATION PAIN - MALE-A-OH

## 2021-05-28 NOTE — TELEPHONE ENCOUNTER
Per DR Louis pt is to be evaluated. No answer on home phone x2. Tried 3rd time. Pt current in ambulance on the way to the hospital as he states he started bleeding out of his penis. H.DeGree, CMA

## 2021-05-28 NOTE — TELEPHONE ENCOUNTER
RN Follow up call:    Patient calling to see if Rx was ordered yet.    Unable to leave home, handicapped and wife is vulnerable adult.    Pt's daughter is able to  prescription within 30 minutes.      PLAN:  Will call immediate clinic needs line to have PCP address.  Spoke with Erika who will address with PCP and patient.    Ivonne Hernandez RN   [facility] RN Triage

## 2021-05-30 VITALS — BODY MASS INDEX: 34.8 KG/M2 | WEIGHT: 222 LBS | BODY MASS INDEX: 34.77 KG/M2 | HEIGHT: 67 IN

## 2021-05-31 VITALS — HEIGHT: 70 IN | BODY MASS INDEX: 31.07 KG/M2 | WEIGHT: 217 LBS

## 2021-05-31 NOTE — PROGRESS NOTES
[facility] Admission note      Patient: Rashad Caputo  MRN: [id number]  Date of Service: 9/3/2019      [facility] [[id number]]  Reason for Visit     Chief Complaint   Patient presents with     H & P       Code Status     Full code    Assessment     -Acute urinary retention status post Valentine catheter placement  -Hematuria currently resolved status post bladder irrigation  -UTI with cultures growing strep viridans  -Acute back pain with underlying history of severe spinal stenoses/kyphoscoliosis with postural instability noted on exam  -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  -Vulnerable adult  -Failure to thrive with difficulty functioning in his home environment.  Multiple ER visits noted with 6 emergency room visits noted     Plan     Patient has been admitted to the TCU.  He currently has an indwelling Valentine catheter denies any hematuria denies any pain.  As per him his constipation issues have resolved and he is no longer taking his MiraLAX.  He understands that he was severely constipated but will not take any MiraLAX as per him.  Encourage compliance.  He is again requesting Afrin nasal spray and told staff that if this was not given to him at bedside because he likes to use it by the hour he will walk out of here.  His bed mobility is profoundly limited he needed 2 people to sit him up he could not even swing his legs out of the bed and to examine his buttocks we had to make him stand requiring significant assistance.  Home care was reviewed he has been labeled as a vulnerable  adult.  Apparently his home has been condemned and he will be looking for alternative placement his wife who has dementia has been moved also from their and placed into institutional care.  I had a discussion with him regarding moving to an alternative place of residence.  Pain management optimization requested by him with underlying history of spinal stenosis plan is to schedule Tylenol 650 mg 4 times daily.  Also will increase naproxen to 220 mg twice daily.  Avoid narcotics he has had a bad response to tramadol in the past  For underlying history of significant mood disorder patient inability to process reality and the fact that he is profoundly debilitated he has no longer any home to go back and is threatening to walk out of this facility and will order a psychology eval.  Total time spent is 45 minutes more than 35 minutes spent face-to-face talking to him and examining this patient including reviewing care concerns including discharge planning and pain management  His poor functional status in light of the fact that he is essentially bedbound with pressure sores and has severe back stenoses along with advanced osteoarthritis of his right knee which are profoundly limiting his mobility.  His functional status is very poor and as per him he has not been able to get any satisfactory response to getting this surgically repaired.  Pain management was also reviewed I am hesitant to give him narcotics.  In light of his underlying personality issues and resistant to care with threatening to leave TCU I am going to order a psychology eval for him    Advance care directives and goals were reviewed with him and total of 16 minutes and more were spent in with him reviewing this CODE STATUS reviewed with him and he is requesting a full code    History     Patient is a very pleasant 69 y.o. male who is admitted to TCU  Patient presented to the hospital with acute urinary retention.  He had a Valentine catheter placed and  currently discharged with an indwelling Valentine catheter.  He will follow with urology for a voiding trial.  He was noted to have hematuria.  Underwent bladder irrigation.  He developed a UTI cultures grew strep viridans.  He was given Levaquin 7 days post discharge.  He has an underlying history of Afrin abuse and medication was discontinued  He has severe osteoarthritis in his right knee.  When I asked him to stand his leg is crooked and swollen with significant effusion seen on his knee joint.  He cannot straighten his leg out and has chronic debilitating end-stage arthritis that he is aware of.  He also gives a history of severe lumbar spinal stenoses.  He has underlying severe kyphoscoliosis which limits his mobility.  He told me that he cannot stand and he has been pretty much bedbound.  Pressure sore noted on his right buttock again chronic as per patient  But mobility was extremely limited  He required 2 people assist to even stand up and that time was extremely painful for him he could barely stand for a short distance without crying out in pain.  His posture was impaired with severe kyphoscoliosis and also deformity noted in his right leg.  Patient is aware of these limitations    Past Medical History     Active Ambulatory (Non-Hospital) Problems    Diagnosis     Urinary retention     Urinary obstruction     Urinary tract infection     Benign prostatic hyperplasia with lower urinary tract symptoms     Post-void dribbling     Rhinitis medicamentosa     Sepsis due to urinary tract infection (H)     Sepsis (H)     CAP (community acquired pneumonia)     CAD (coronary artery disease)     Non-STEMI (non-ST elevated myocardial infarction) (H)     Tachycardia     Anxiety     Spinal Stenosis     Hypertension     Benign Prostatic Hypertrophy     Myalgia And Myositis     Urinary Tract Infection     Feelings Of Urinary Urgency     Joint Pain, Localized In The Hip     Past Medical History:   Diagnosis Date     Arthritis       Benign prostatic hyperplasia with lower urinary tract symptoms 2/23/2017     BPH (benign prostatic hypertrophy)      Coronary artery disease      History of transfusion 1975     Hypertension      Pneumonia 1980     Post-void dribbling 2/23/2017     Retinal tear of right eye      Rhinitis medicamentosa 2/23/2017     Spinal stenosis      UTI (urinary tract infection)        Past Social History     Reviewed, and he  reports that he quit smoking about 34 years ago. He has never used smokeless tobacco. He reports that he does not drink alcohol or use drugs.    Family History     Reviewed, and includes cataracts and CVD in his father; his mother had dementia; grandmother had DM    Medication List     Current Outpatient Medications on File Prior to Visit   Medication Sig Dispense Refill     acetaminophen (TYLENOL) 500 MG tablet Take 1-2 tablets (500-1,000 mg total) by mouth every 4 (four) hours as needed.  0     fluticasone propionate (FLONASE) 50 mcg/actuation nasal spray Apply 1 spray into each nostril 3 (three) times a day as needed. For overuse of nasal decongestant.       levoFLOXacin (LEVAQUIN) 500 MG tablet Take 1 tablet (500 mg total) by mouth Daily at 6:00 am for 7 days. 7 tablet 0     naproxen sodium (ALEVE) 220 MG tablet Take 220 mg by mouth at bedtime.       phenylephrine (NICOLE-SYNEPHRINE) 1 % Spry 1-2 sprays into each nostril every 4 (four) hours as needed.              polyethylene glycol (MIRALAX) 17 gram packet Take 1 packet (17 g total) by mouth daily.  0     sodium chloride (OCEAN) 0.65 % nasal spray 1 spray into each nostril as needed for congestion. Use with phenylephrine spray.       No current facility-administered medications on file prior to visit.        Allergies     Allergies   Allergen Reactions     Lisinopril Shortness Of Breath and Dizziness     Fosinopril Sodium Other (See Comments) and Dizziness     Mouth numbness     Amoxicillin-Pot Clavulanate Hives and Rash     Cephalosporins Hives and  Rash     Penicillins Rash       Review of Systems   A comprehensive review of 14 systems was done. Pertinent findings noted here and in history of present illness. All the rest negative.  Constitutional: Negative.  Negative for fever, chills, activity change, appetite change and fatigue.   HENT: Negative for congestion and facial swelling.    Eyes: Negative for photophobia, redness and visual disturbance.   Respiratory: Negative for cough and chest tightness.    Cardiovascular: Negative for chest pain, palpitations and leg swelling.   Gastrointestinal: Negative for nausea, diarrhea, constipation, blood in stool and abdominal distention.   Genitourinary: Negative.    Musculoskeletal: Negative.    Skin: Negative.  Hyperpigmented rescaling noted in both of his hands.  Patient told me it is from chronic handwashing  Neurological: Negative for dizziness, tremors, syncope, weakness, light-headedness and headaches.   Hematological: Does not bruise/bleed easily.   Psychiatric/Behavioral: Negative.        Physical Exam     Recent Vitals 8/31/2019   Height -   Weight -   BSA (m2) -   /78   Pulse 81   Temp 97.4   Temp src 1   SpO2 100   Some recent data might be hidden       Constitutional: Oriented to person, place, and time and appears well-developed.   Looks very disabled and deconditioned  HEENT:  Normocephalic and atraumatic.  Eyes: Conjunctivae and EOM are normal. Pupils are equal, round, and reactive to light. No discharge.  No scleral icterus. Nose normal. Mouth/Throat: Oropharynx is clear and moist. No oropharyngeal exudate.    NECK: Normal range of motion. Neck supple. No JVD present. No tracheal deviation present. No thyromegaly present.   CARDIOVASCULAR: Normal rate, regular rhythm and intact distal pulses.  Exam reveals no gallop and no friction rub.  Systolic murmur present.  PULMONARY: Effort normal and breath sounds normal. No respiratory distress.No Wheezing or rales.  ABDOMEN: Soft. Bowel sounds are  normal. No distension and no mass.  There is no tenderness. There is no rebound and no guarding. No HSM.  Open area noted on his right buttock which appears to be chronic with hyperpigmented changes noted with irritation on his buttocks  MUSCULOSKELETAL: Normal range of motion. No edema and no tenderness. Mild kyphosis, no tenderness.  Right lower extremity is swollen with profound arthritic changes noted with swelling in his right knee.  On standing leg is crooked and patient is not able to stand up straight.  Profound kyphoscoliosis of his back also noted and he could not straighten himself up he was in severe pain  LYMPH NODES: Has no cervical, supraclavicular, axillary and groin adenopathy.   NEUROLOGICAL: Alert and oriented to person, place, and time. No cranial nerve deficit.  Normal muscle tone. Coordination normal.   GENITOURINARY: Deferred exam.  Valentine present  SKIN: Skin is warm and dry. No rash noted. No erythema. No pallor.   Hypopigmentary changes noted in his both fingers and hands which patient attributes to chronic handwashing  EXTREMITIES: No cyanosis, no clubbing, no edema. No Deformity.  PSYCHIATRIC: Normal mood, affect and behavior.      Lab Results       Lab Results   Component Value Date    ALBUMIN 4.0 02/15/2019    ALT 19 02/15/2019    AST 19 02/15/2019    BUN 18 08/28/2019    CALCIUM 9.1 08/28/2019     08/28/2019    CHOL 132 10/19/2014    CO2 23 08/28/2019    CREATININE 0.58 (L) 08/29/2019    GFRAA >60 08/29/2019    GFRNONAA >60 08/29/2019    GLU 93 08/28/2019    HCT 36.7 (L) 08/28/2019    WBC 8.9 08/28/2019    HGB 10.0 (L) 08/29/2019    MG 1.6 (L) 08/29/2019     08/28/2019    K 4.0 08/29/2019    PSA 4.5 02/15/2013     08/28/2019           Imaging Results     Xr Abdomen 2 Views    Result Date: 8/27/2019  EXAM: XR ABDOMEN 2 VIEWS LOCATION: [facility] DATE/TIME: 8/27/2019 8:20 AM INDICATION: Constipation. COMPARISON: 9/5/2018 CT. FINDINGS: Relatively large amount of  stool in the rectum and moderate amount of stool in the right colon. Bowel gas pattern is normal with no evidence of obstruction. No free air. Advanced spondylotic change and moderate thoracolumbar curvature.       Ct Abdomen Pelvis Without Oral With Without Iv Contrast    Result Date: 8/27/2019  EXAM: CT ABDOMEN PELVIS WO ORAL W WO IV CONTRAST LOCATION: [facility] DATE/TIME: 8/27/2019 11:09 AM INDICATION: Hematuria, unknown cause hematuria COMPARISON: 6/5/2018 TECHNIQUE: Helical thin-section CT scan of the abdomen and pelvis was performed without contrast. Images were then obtained during and after injection of IV contrast, with delayed images through the renal collecting systems. Multiplanar reformats were obtained. Dose reduction techniques were used.? CONTRAST: Iohexol (Omni) 100 mL FINDINGS: LUNG BASES: There is coronary artery calcification. ABDOMEN: The kidneys are normal in size without perinephric stranding. There is no hydronephrosis or hydroureter. There are no renal or  ureteral calculi. A small left renal parapelvic cysts is suspected. Normal spleen, pancreas, adrenal glands, liver, and gallbladder. Normal stomach. Normal caliber of the small bowel. There is atherosclerotic disease. PELVIS: There is a Valentine catheter in the urinary bladder, which is not distended. There is urinary bladder wall thickening with mucosal enhancement. The prostate gland is not well assessed. The seminal vesicles are symmetric. There is a large amount of stool in the rectum. Otherwise there is a moderate amount of stool throughout the remainder of the colon. The appendix is not discretely visualized. MUSCULOSKELETAL: There is lateral curvature of the spine. Degenerative osseous changes are present.     CONCLUSION: 1.  Wall thickening and mucosal enhancement of the urinary bladder likely represent cystitis. A Valentine catheter is present. 2.  No renal or ureteral calculi. No hydronephrosis or hydroureter. 3.  Large amount  of stool in the rectum.       Post Discharge Medication Reconciliation Status: discharge medications reconciled and changed, per note/orders (see AVS)      NICKOLAS Alvarado

## 2021-06-01 VITALS — WEIGHT: 195.31 LBS | HEIGHT: 65 IN | BODY MASS INDEX: 32.54 KG/M2

## 2021-06-01 VITALS — HEIGHT: 70 IN | BODY MASS INDEX: 31.14 KG/M2

## 2021-06-01 NOTE — PROGRESS NOTES
[facility]      NAME:  Rashad Caputo             :  1950    MRN: [id number]    CODE STATUS:  FULL CODE    FACILITY: [facility] [[id number]]         CHIEF COMPLAIN/REASON FOR VISIT:  Chief Complaint   Patient presents with     Review Of Multiple Medical Conditions       HISTORY OF PRESENT ILLNESS: Rashad Caputo is a 69 y.o. male being seen for review of multiple medical conditions.. Patient presented to the hospital with acute urinary retention.  He had a Valentine catheter placed and currently discharged with an indwelling Valentine catheter.  He will follow with urology for a voiding trial. He was noted to have hematuria on , improved today. He developed a UTI cultures grew strep viridans.  He was given Levaquin 7 days post discharge.He has bowel issues of fecal incontinence D/T this, he cancelled his  apt. He remains in a weakened deconditioned state, working towards dc soon, disposition still undetermined.    Allergies   Allergen Reactions     Lisinopril Shortness Of Breath and Dizziness     Fosinopril Sodium Other (See Comments) and Dizziness     Mouth numbness     Amoxicillin-Pot Clavulanate Hives and Rash     Cephalosporins Hives and Rash     Penicillins Rash   :     Current Outpatient Medications   Medication Sig     acetaminophen (TYLENOL) 500 MG tablet Take 1-2 tablets (500-1,000 mg total) by mouth every 4 (four) hours as needed.     cyclobenzaprine (FLEXERIL) 10 MG tablet Take 1 tablet (10 mg total) by mouth 2 (two) times a day as needed for muscle spasms.     fluticasone propionate (FLONASE) 50 mcg/actuation nasal spray Apply 1 spray into each nostril 3 (three) times a day as needed. For overuse of nasal decongestant.     magnesium oxide (MAG-OX) 400 mg (241.3 mg magnesium) tablet Take 400 mg by mouth daily.     melatonin 3 mg Tab tablet Take 5 mg by mouth at bedtime as needed.     naproxen sodium (ALEVE) 220 MG tablet Take 220 mg by mouth at bedtime.      phenylephrine (NICOLE-SYNEPHRINE) 1 % Spry 1-2 sprays into each nostril every 4 (four) hours as needed.            polyethylene glycol (MIRALAX) 17 gram packet Take 1 packet (17 g total) by mouth daily.     sodium chloride (OCEAN) 0.65 % nasal spray 1 spray into each nostril as needed for congestion. Use with phenylephrine spray.         REVIEW OF SYSTEMS:    Currently, no fever, chills, or rigors. Does not have any visual or hearing problems. Denies any chest pain, headaches, palpitations, lightheadedness, dizziness, shortness of breath, or cough. Appetite is good. Denies any GERD symptoms. Denies any difficulty with swallowing, nausea, or vomiting.  Denies any abdominal pain, was constipated now with loose stools d/t prune juice. Denies any urinary symptoms other then retention,. No insomnia. No active bleeding. No rash.       PHYSICAL EXAMINATION:  Vitals:    09/25/19 1007   BP: 131/80   Pulse: 82   Temp: 98.2  F (36.8  C)   Weight: 176 lb (79.8 kg)         GENERAL: Awake, Alert, oriented x3,unkempt in appearance,  not in any form of acute distress, answers questions appropriately, follows simple commands, conversant  HEENT: Head is normocephalic with normal hair distribution. No evidence of trauma. Ears: No acute purulent discharge. Eyes: Conjunctivae pink with no scleral jaundice. Nose: Normal mucosa and septum. NECK: Supple with no cervical or supraclavicular lymphadenopathy. Trachea is midline.   CHEST: No tenderness or deformity, no crepitus  LUNG: Clear to auscultation with good chest expansion. There are no crackles or wheezes, normal AP diameter.  BACK: No kyphosis of the thoracic spine. Symmetric, no curvature, ROM normal, no CVA tenderness, no spinal tenderness   CVS: There is good S1  S2,  rhythm is regular.  ABDOMEN: Globular and soft, nontender to palpation, non distended, no masses, no organomegaly, good bowel sounds, no rebound or guarding, no peritoneal signs.   EXTREMITIES: ROM UE WNL, he is  unable to bear wt on legs. In wc. Pedal pulses present, no edema, arthritic aged joint swelling, right knee very edematous. .  SKIN: Warm and dry, no erythema noted, no rashes or lesions.  NEUROLOGICAL: The patient is oriented to person, place and time. Strength and sensation are grossly intact. Face is symmetric.                  LABS:    Lab Results   Component Value Date    WBC 7.3 09/08/2019    HGB 11.4 (L) 09/08/2019    HCT 35.2 (L) 09/08/2019    MCV 82 09/08/2019     09/08/2019       Results for orders placed or performed during the hospital encounter of 09/08/19   Basic Metabolic Panel   Result Value Ref Range    Sodium 136 136 - 145 mmol/L    Potassium 4.6 3.5 - 5.0 mmol/L    Chloride 104 98 - 107 mmol/L    CO2 25 22 - 31 mmol/L    Anion Gap, Calculation 7 5 - 18 mmol/L    Glucose 101 70 - 125 mg/dL    Calcium 9.3 8.5 - 10.5 mg/dL    BUN 22 8 - 22 mg/dL    Creatinine 0.65 (L) 0.70 - 1.30 mg/dL    GFR MDRD Af Amer >60 >60 mL/min/1.73m2    GFR MDRD Non Af Amer >60 >60 mL/min/1.73m2           No results found for: HGBA1C  No results found for: [id number]  No results found for: [id number]    ASSESSMENT/PLAN:  1. Anxiety    2. Urinary retention    3. Fecal smearing      1. Anxiety: Rashad needs much encouragement and reassurance due to anxiety. He has Gabapentin scheduled for pain and this helps with anxiety as well. ACP services ordered.    2. Retention and. Urinary obstruction/ sepsis : Valentine in place, hematuria has disapated Encouraged fluid .H e has  F/U was cancelled. He is now completed ABX regime. I am encouraging him to keep his  apt that has been rescheduled, reluctant due to fecal incontinence.Appointment on 9/27/19 at 8:15am, [facility]     3. Fecal smearing/Incontience: Spoke to Rashad r/t bowels. He is on scheduled program.  I requested to perform rectal check to eval for a bowel vault blockage , he refused. We will dc his miralax as he is refusing.    Electronically signed by:  Anuradha  ROLF Barrett  This progress note was completed using Dragon software and there may be grammatical errors.

## 2021-06-01 NOTE — PROGRESS NOTES
[facility] Emergency Admission note      Patient: Rashad Caputo  MRN: [id number]  Date of Service: 9/10/2019      [facility] [[id number]]  Reason for Visit     Chief Complaint   Patient presents with     Review Of Multiple Medical Conditions       Code Status     Full code    Assessment   -Status post ER visit secondary to acute high muscle pain  -Severe back leg and knee pain not responding to current treatment regimen  -Acute urinary retention status post Valentine catheter placement  -Hematuria currently resolved status post bladder irrigation  -UTI with cultures growing strep viridans  -Acute back pain with underlying history of severe spinal stenoses/kyphoscoliosis with postural instability noted on exam  -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  -Vulnerable adult  -Failure to thrive with difficulty functioning in his home environment.  Multiple ER visits noted with 6 emergency room visits noted     Plan     Patient has been admitted to the TCU.  Catheter issues reviewed with him he does not want a catheter exchange but he does want a UA UC he does not have any dysuria but he wants to ensure that things are good I did tell him that we like to do a catheter exchange he refused after that.  Pain management reviewed he is on Tylenol with naproxen not much improvement noted he went to the emergency room no improvement there either.  He does not want to use narcotics.  Plan is to order topical Voltaren gel and Lidoderm patches.  Counseled to follow-up with orthopedic surgeon for consideration of either knee  replacement arthroplasty versus steroid injection in his knee which are both options which are not acceptable to him at present.  He has had a bad outcome with tramadol also in the past  He is also given Flexeril 10 mg twice daily which she has not used much.  Plan is to give him a trial of gabapentin will add 100 mg twice daily.  Also and Zanaflex 4 mg at noon and 5 PM this is a times when he has maximum muscle pain.  He remains sedentary and poorly ambulatory.   We will also check CK level   labs done in the ER reviewed and they were stable   He unfortunately is not sitting up and lays flat in bed all day long.  He has a sacral pressure sore and he says sitting up is not conducive to him as it causes severe pain in his buttocks  Total time spent is 35 minutes more than 23 minutes spent face-to-face talking to this patient reviewing his pain management with him.  This is outlined in my note above and the changes as made above.  He does not want to try narcotics as yet.  He has tried tramadol with a bad response in the past.  He is somewhat not anxious to follow-up with orthopedics.  He does not want to pursue a steroid injection as he feels this will not be beneficial to him.  History     Patient is a very pleasant 69 y.o. male who is admitted to TCU  Patient presented to the hospital with acute urinary retention.  He had a Valentine catheter placed and currently discharged with an indwelling Valentine catheter.  He will follow with urology for a voiding trial.  He was noted to have hematuria.  Underwent bladder irrigation.  He developed a UTI cultures grew strep viridans.  He was given Levaquin 7 days post discharge.  Today again he is requesting that his UA UC be done again.  I did review this with him and he has a follow-up appointment with urology this Friday.  I have offered to have a UA UC done at his request but informed him that he would need a catheter exchange he refuses to have that done  He is not reporting any  symptoms though.  He has complaints of anterior thigh pain.  He went to the emergency room on 9/8/2019 with that.  He had Doppler ultrasound of bilateral lower extremities which was negative.  Additional work-up included hemoglobin of 11.4 with no leukocytosis BMP was normal magnesium was normal at that point he was disc charged back with Flexeril he is not quite happy   We did have a discussion regarding pain management he is not taking narcotics in the past and does not want to use them as he has a risk of dependency.  Additional support with Tylenol and naproxen has been provided to him.  He does not want to pursue steroids or any intralesional injection.  He feels that will be of no benefit to him.  He is planning to have a knee replacement arthroplasty done soon but so far no decision has been made.  Constipation concerns improving he is refusing to take MiraLAX he said laying in bed and reports that he has severe muscle spasms in the afternoon    Past Medical History     Active Ambulatory (Non-Hospital) Problems    Diagnosis     Urinary retention     Urinary obstruction     Urinary tract infection     Benign prostatic hyperplasia with lower urinary tract symptoms     Post-void dribbling     Rhinitis medicamentosa     Sepsis due to urinary tract infection (H)     Sepsis (H)     CAP (community acquired pneumonia)     CAD (coronary artery disease)     Non-STEMI (non-ST elevated myocardial infarction) (H)     Tachycardia     Anxiety     Spinal stenosis     Hypertension     Benign Prostatic Hypertrophy     Myalgia And Myositis     Urinary Tract Infection     Feelings Of Urinary Urgency     Joint Pain, Localized In The Hip     Past Medical History:   Diagnosis Date     Arthritis      Benign prostatic hyperplasia with lower urinary tract symptoms 2/23/2017     BPH (benign prostatic hypertrophy)      Coronary artery disease      History of transfusion 1975     Hypertension      Pneumonia 1980     Post-void dribbling  2/23/2017     Retinal tear of right eye      Rhinitis medicamentosa 2/23/2017     Spinal stenosis      UTI (urinary tract infection)        Past Social History     Reviewed, and he  reports that he quit smoking about 34 years ago. He has never used smokeless tobacco. He reports that he does not drink alcohol or use drugs.    Family History     Reviewed, and includes cataracts and CVD in his father; his mother had dementia; grandmother had DM    Medication List     Current Outpatient Medications on File Prior to Visit   Medication Sig Dispense Refill     acetaminophen (TYLENOL) 500 MG tablet Take 1-2 tablets (500-1,000 mg total) by mouth every 4 (four) hours as needed.  0     cyclobenzaprine (FLEXERIL) 10 MG tablet Take 1 tablet (10 mg total) by mouth 2 (two) times a day as needed for muscle spasms. 10 tablet 0     fluticasone propionate (FLONASE) 50 mcg/actuation nasal spray Apply 1 spray into each nostril 3 (three) times a day as needed. For overuse of nasal decongestant.       magnesium oxide (MAG-OX) 400 mg (241.3 mg magnesium) tablet Take 400 mg by mouth daily.       naproxen sodium (ALEVE) 220 MG tablet Take 220 mg by mouth at bedtime.       phenylephrine (NICOLE-SYNEPHRINE) 1 % Spry 1-2 sprays into each nostril every 4 (four) hours as needed.              polyethylene glycol (MIRALAX) 17 gram packet Take 1 packet (17 g total) by mouth daily.  0     sodium chloride (OCEAN) 0.65 % nasal spray 1 spray into each nostril as needed for congestion. Use with phenylephrine spray.       No current facility-administered medications on file prior to visit.        Allergies     Allergies   Allergen Reactions     Lisinopril Shortness Of Breath and Dizziness     Fosinopril Sodium Other (See Comments) and Dizziness     Mouth numbness     Amoxicillin-Pot Clavulanate Hives and Rash     Cephalosporins Hives and Rash     Penicillins Rash       Review of Systems   A comprehensive review of 14 systems was done. Pertinent findings  noted here and in history of present illness. All the rest negative.  Constitutional: Negative.  Negative for fever, chills, activity change, appetite change and fatigue.   HENT: Negative for congestion and facial swelling.    Eyes: Negative for photophobia, redness and visual disturbance.   Respiratory: Negative for cough and chest tightness.    Cardiovascular: Negative for chest pain, palpitations and leg swelling.   Gastrointestinal: Negative for nausea, diarrhea, constipation, blood in stool and abdominal distention.   Genitourinary: Negative.    Musculoskeletal: Negative.    Skin: Negative.  Hyperpigmented rescaling noted in both of his hands.  Patient told me it is from chronic handwashing  Neurological: Negative for dizziness, tremors, syncope, weakness, light-headedness and headaches.   Hematological: Does not bruise/bleed easily.   Psychiatric/Behavioral: Negative.        Physical Exam     Recent Vitals 9/8/2019   Height -   Weight -   BSA (m2) -   BP -   Pulse -   Temp 98.6   Temp src -   SpO2 -   Some recent data might be hidden       Constitutional: Oriented to person, place, and time and appears well-developed.   Looks very disabled and deconditioned  HEENT:  Normocephalic and atraumatic.  Eyes: Conjunctivae and EOM are normal. Pupils are equal, round, and reactive to light. No discharge.  No scleral icterus. Nose normal. Mouth/Throat: Oropharynx is clear and moist. No oropharyngeal exudate.    NECK: Normal range of motion. Neck supple. No JVD present. No tracheal deviation present. No thyromegaly present.   CARDIOVASCULAR: Normal rate, regular rhythm and intact distal pulses.  Exam reveals no gallop and no friction rub.  Systolic murmur present.  PULMONARY: Effort normal and breath sounds normal. No respiratory distress.No Wheezing or rales.  ABDOMEN: Soft. Bowel sounds are normal. No distension and no mass.  There is no tenderness. There is no rebound and no guarding. No HSM.  Open area noted on his  right buttock which appears to be chronic with hyperpigmented changes noted with irritation on his buttocks  MUSCULOSKELETAL: Normal range of motion. No edema and no tenderness. Mild kyphosis, no tenderness.  Right lower extremity is swollen with profound arthritic changes noted with swelling in his right knee.  On standing leg is crooked and patient is not able to stand up straight.  Profound kyphoscoliosis of his back also noted and he could not straighten himself up he was in severe pain  LYMPH NODES: Has no cervical, supraclavicular, axillary and groin adenopathy.   NEUROLOGICAL: Alert and oriented to person, place, and time. No cranial nerve deficit.  Normal muscle tone. Coordination normal.   GENITOURINARY: Deferred exam.  Valentine present  SKIN: Skin is warm and dry. No rash noted. No erythema. No pallor.   Hypopigmentary changes noted in his both fingers and hands which patient attributes to chronic handwashing  EXTREMITIES: No cyanosis, no clubbing, no edema. No Deformity.  PSYCHIATRIC: Normal mood, affect and behavior.      Lab Results       Lab Results   Component Value Date    ALBUMIN 4.0 02/15/2019    ALT 19 02/15/2019    AST 19 02/15/2019    BUN 22 09/08/2019    CALCIUM 9.3 09/08/2019     09/08/2019    CHOL 132 10/19/2014    CO2 25 09/08/2019    CREATININE 0.65 (L) 09/08/2019    GFRAA >60 09/08/2019    GFRNONAA >60 09/08/2019     09/08/2019    HCT 35.2 (L) 09/08/2019    WBC 7.3 09/08/2019    HGB 11.4 (L) 09/08/2019    MG 1.8 09/08/2019     09/08/2019    K 4.6 09/08/2019    PSA 4.5 02/15/2013     09/08/2019             Post Discharge Medication Reconciliation Status: discharge medications reconciled and changed, per note/orders (see AVS)      NICKOLAS Alvarado

## 2021-06-01 NOTE — PROGRESS NOTES
[facility]      NAME:  Rashad Caputo             :  1950    MRN: [id number]    CODE STATUS:  FULL CODE    FACILITY: [facility] [[id number]]         CHIEF COMPLAIN/REASON FOR VISIT:  Chief Complaint   Patient presents with     Review Of Multiple Medical Conditions       HISTORY OF PRESENT ILLNESS: Rashad Caputo is a 69 y.o. male being seen at the request of nursing.Pt with ongoing c/o pain and on call light frequently. He had rounding MD adjust meds on 9/3/19 and also gave pysch consult. Pt unkempt and will not allow staff to assist with bathing, He has several c/o pain today and I discussed several options with him, including going to ED if he felt his needs were not met. He declined ED visit. Patient presented to the hospital with acute urinary retention.  He had a Valentine catheter placed and currently discharged with an indwelling Valentine catheter.  He will follow with urology for a voiding trial. He was noted to have hematuria. He developed a UTI cultures grew strep viridans.  He was given Levaquin 7 days post discharge, WILL [place] REGIME ON TCU.    Allergies   Allergen Reactions     Lisinopril Shortness Of Breath and Dizziness     Fosinopril Sodium Other (See Comments) and Dizziness     Mouth numbness     Amoxicillin-Pot Clavulanate Hives and Rash     Cephalosporins Hives and Rash     Penicillins Rash   :     Current Outpatient Medications   Medication Sig     acetaminophen (TYLENOL) 500 MG tablet Take 1-2 tablets (500-1,000 mg total) by mouth every 4 (four) hours as needed.     fluticasone propionate (FLONASE) 50 mcg/actuation nasal spray Apply 1 spray into each nostril 3 (three) times a day as needed. For overuse of nasal decongestant.     levoFLOXacin (LEVAQUIN) 500 MG tablet Take 1 tablet (500 mg total) by mouth Daily at 6:00 am for 7 days.     naproxen sodium (ALEVE) 220 MG tablet Take 220 mg by mouth at bedtime.     phenylephrine (NICOLE-SYNEPHRINE) 1 % Spry  1-2 sprays into each nostril every 4 (four) hours as needed.            polyethylene glycol (MIRALAX) 17 gram packet Take 1 packet (17 g total) by mouth daily.     sodium chloride (OCEAN) 0.65 % nasal spray 1 spray into each nostril as needed for congestion. Use with phenylephrine spray.         REVIEW OF SYSTEMS:    Currently, no fever, chills, or rigors. Does not have any visual or hearing problems. Denies any chest pain, headaches, palpitations, lightheadedness, dizziness, shortness of breath, or cough. Appetite is good. Denies any GERD symptoms. Denies any difficulty with swallowing, nausea, or vomiting.  Denies any abdominal pain, diarrhea or constipation. Denies any urinary symptoms other then retention,. No insomnia. No active bleeding. No rash.       PHYSICAL EXAMINATION:  Vitals:    09/04/19 1825   BP: 145/84   Pulse: 93   Temp: 96.8  F (36  C)   Weight: 171 lb 3.2 oz (77.7 kg)         GENERAL: Awake, Alert, oriented x3,unkempt in appearance,  not in any form of acute distress, answers questions appropriately, follows simple commands, conversant  HEENT: Head is normocephalic with normal hair distribution. No evidence of trauma. Ears: No acute purulent discharge. Eyes: Conjunctivae pink with no scleral jaundice. Nose: Normal mucosa and septum. NECK: Supple with no cervical or supraclavicular lymphadenopathy. Trachea is midline.   CHEST: No tenderness or deformity, no crepitus  LUNG: Clear to auscultation with good chest expansion. There are no crackles or wheezes, normal AP diameter.  BACK: No kyphosis of the thoracic spine. Symmetric, no curvature, ROM normal, no CVA tenderness, no spinal tenderness   CVS: There is good S1  S2,  rhythm is regular.  ABDOMEN: Globular and soft, nontender to palpation, non distended, no masses, no organomegaly, good bowel sounds, no rebound or guarding, no peritoneal signs.   EXTREMITIES: ROM UE WNL, he is unable to bear wt on legs. In wc. Pedal pulses present, no  edema.  SKIN: Warm and dry, no erythema noted, no rashes or lesions.  NEUROLOGICAL: The patient is oriented to person, place and time. Strength and sensation are grossly intact. Face is symmetric.                    LABS:    Lab Results   Component Value Date    WBC 8.9 08/28/2019    HGB 10.0 (L) 08/29/2019    HCT 36.7 (L) 08/28/2019    MCV 84 08/28/2019     08/28/2019       Results for orders placed or performed during the hospital encounter of 08/27/19   Basic Metabolic Panel   Result Value Ref Range    Sodium 140 136 - 145 mmol/L    Potassium 3.9 3.5 - 5.0 mmol/L    Chloride 107 98 - 107 mmol/L    CO2 23 22 - 31 mmol/L    Anion Gap, Calculation 10 5 - 18 mmol/L    Glucose 93 70 - 125 mg/dL    Calcium 9.1 8.5 - 10.5 mg/dL    BUN 18 8 - 22 mg/dL    Creatinine 0.61 (L) 0.70 - 1.30 mg/dL    GFR MDRD Af Amer >60 >60 mL/min/1.73m2    GFR MDRD Non Af Amer >60 >60 mL/min/1.73m2           No results found for: HGBA1C  No results found for: FJVMXOWQ88TW  No results found for: [id number]    ASSESSMENT/PLAN:  1. Pain in joint involving pelvic region and thigh, unspecified laterality    2. Urinary obstruction      1. Pain management: Pt reports ongoing hip, back and leg pain. We did order some voltaren and he was offerred to go to ER for eval, he declined. I talked with therapy and we did review diathermy, PT will see him this am and start this therapy on him.    3. Urinary obstruction: Valentine in place, hematuria present. Encouraged fluid and request nursing assure he has a secure strap in place.      Electronically signed by:  Anuradha Barrett CNP  This progress note was completed using Dragon software and there may be grammatical errors.

## 2021-06-01 NOTE — PROGRESS NOTES
[facility]      NAME:  Rashad Caputo             :  1950    MRN: [id number]    CODE STATUS:  FULL CODE    FACILITY: [facility] [[id number]]         CHIEF COMPLAIN/REASON FOR VISIT:  Chief Complaint   Patient presents with     Review Of Multiple Medical Conditions       HISTORY OF PRESENT ILLNESS: Rashad Caputo is a 69 y.o. male being seen for review of multiple medical conditions..Nursing concerns over wt gain, however not sudden, this has been gradual since admission. Patient presented to the hospital with acute urinary retention.  He had a Valentine catheter placed and currently discharged with an indwelling Valentine catheter.  He will follow with urology for a voiding trial. He was noted to have hematuria on , improved today. He developed a UTI cultures grew strep viridans.  He was given Levaquin 7 days post discharge.He has bowel issues of fecal incontinence D/T this, he cancelled his  apt. He remains in a weakened deconditioned state, working towards dc soon, disposition still undetermined.    Allergies   Allergen Reactions     Lisinopril Shortness Of Breath and Dizziness     Fosinopril Sodium Other (See Comments) and Dizziness     Mouth numbness     Amoxicillin-Pot Clavulanate Hives and Rash     Cephalosporins Hives and Rash     Penicillins Rash   :     Current Outpatient Medications   Medication Sig     acetaminophen (TYLENOL) 500 MG tablet Take 1-2 tablets (500-1,000 mg total) by mouth every 4 (four) hours as needed.     cyclobenzaprine (FLEXERIL) 10 MG tablet Take 1 tablet (10 mg total) by mouth 2 (two) times a day as needed for muscle spasms.     fluticasone propionate (FLONASE) 50 mcg/actuation nasal spray Apply 1 spray into each nostril 3 (three) times a day as needed. For overuse of nasal decongestant.     magnesium oxide (MAG-OX) 400 mg (241.3 mg magnesium) tablet Take 400 mg by mouth daily.     melatonin 3 mg Tab tablet Take 5 mg by mouth at  bedtime as needed.     naproxen sodium (ALEVE) 220 MG tablet Take 220 mg by mouth at bedtime.     phenylephrine (NICOLE-SYNEPHRINE) 1 % Spry 1-2 sprays into each nostril every 4 (four) hours as needed.            polyethylene glycol (MIRALAX) 17 gram packet Take 1 packet (17 g total) by mouth daily.     sodium chloride (OCEAN) 0.65 % nasal spray 1 spray into each nostril as needed for congestion. Use with phenylephrine spray.         REVIEW OF SYSTEMS:    Currently, no fever, chills, or rigors. Does not have any visual or hearing problems. Denies any chest pain, headaches, palpitations, lightheadedness, dizziness, shortness of breath, or cough. Appetite is good. Denies any GERD symptoms. Denies any difficulty with swallowing, nausea, or vomiting.  Denies any abdominal pain, was constipated now with loose stools d/t prune juice. Denies any urinary symptoms other then retention,. No insomnia. No active bleeding other then ongoing hematuria. No rash.       PHYSICAL EXAMINATION:  Vitals:    10/01/19 2014   BP: 121/74   Pulse: 87   Temp: 97.1  F (36.2  C)   Weight: 184 lb 6.4 oz (83.6 kg)         GENERAL: Awake, Alert, oriented x3,unkempt in appearance,  not in any form of acute distress, answers questions appropriately, follows simple commands, conversant  HEENT: Head is normocephalic with normal hair distribution. No evidence of trauma. Ears: No acute purulent discharge. Eyes: Conjunctivae pink with no scleral jaundice. Nose: Normal mucosa and septum. NECK: Supple with no cervical or supraclavicular lymphadenopathy. Trachea is midline.   CHEST: No tenderness or deformity, no crepitus  LUNG: Clear to auscultation with good chest expansion. There are no crackles or wheezes, normal AP diameter.  BACK: No kyphosis of the thoracic spine. Symmetric, no curvature, ROM normal, no CVA tenderness, no spinal tenderness   CVS: There is good S1  S2,  rhythm is regular.  ABDOMEN: Globular and soft, nontender to palpation, non  distended, no masses, no organomegaly, good bowel sounds, no rebound or guarding, no peritoneal signs.   EXTREMITIES: ROM UE WNL, he is unable to bear wt on legs. In wc. Pedal pulses present, no edema, arthritic aged joint swelling, right knee very edematous. .Trace edema bilaterally  SKIN: Warm and dry, no erythema noted, no rashes or lesions.  NEUROLOGICAL: The patient is oriented to person, place and time. Strength and sensation are grossly intact. Face is symmetric.          LABS:    Lab Results   Component Value Date    WBC 7.3 09/08/2019    HGB 11.4 (L) 09/08/2019    HCT 35.2 (L) 09/08/2019    MCV 82 09/08/2019     09/08/2019       Results for orders placed or performed in visit on 10/01/19   Basic Metabolic Panel   Result Value Ref Range    Sodium 139 136 - 145 mmol/L    Potassium 4.4 3.5 - 5.0 mmol/L    Chloride 105 98 - 107 mmol/L    CO2 26 22 - 31 mmol/L    Anion Gap, Calculation 8 5 - 18 mmol/L    Glucose 87 70 - 125 mg/dL    Calcium 9.2 8.5 - 10.5 mg/dL    BUN 31 (H) 8 - 22 mg/dL    Creatinine 0.71 0.70 - 1.30 mg/dL    GFR MDRD Af Amer >60 >60 mL/min/1.73m2    GFR MDRD Non Af Amer >60 >60 mL/min/1.73m2           No results found for: HGBA1C  No results found for: [id number]  No results found for: IAVMASJL20    ASSESSMENT/PLAN:  1. Urinary retention    2. Weight gain      1.  Retention and. Urinary obstruction/ sepsis : Valentine in place, hematuria at random periods, ua was negative. He has   Been to  on 9/30/19 and we are going to try void trial at end of week.   Encouraged fluid     2. Wy Gain:Nursing asked me to see pt for wt gain: He does have minimal edema bilateral to legs, lungs are clear. I suspect ongoing wt gain is slow progressive vs acute onset. Rashad agrees he has been eating at the TCU better then at home, FTH H/O and he reports good appetite and eating 3 meals and HS snack.        Electronically signed by:  Anuradha Barrett CNP  This progress note was completed using Dragon software  and there may be grammatical errors.

## 2021-06-01 NOTE — PROGRESS NOTES
[facility]      NAME:  Rashad Caputo             :  1950    MRN: [id number]    CODE STATUS:  FULL CODE    FACILITY: [facility] [[id number]]         CHIEF COMPLAIN/REASON FOR VISIT:  Chief Complaint   Patient presents with     Review Of Multiple Medical Conditions       HISTORY OF PRESENT ILLNESS: Rashad Caputo is a 69 y.o. male being seen for review of multiple medical conditions.  He had rounding MD adjust meds on 9/3/19 and also gave pysch consult. Pt unkempt and will not allow staff to assist with bathing, however today he told me one shower a week is not enough. Talked to NM to increase shower schedule. Patient presented to the hospital with acute urinary retention.  He had a Valentine catheter placed and currently discharged with an indwelling Valentine catheter.  He will follow with urology for a voiding trial. He was noted to have hematuria on , improved today. He developed a UTI cultures grew strep viridans.  He was given Levaquin 7 days post discharge, WILL [place] REGIME ON TCU.    Allergies   Allergen Reactions     Lisinopril Shortness Of Breath and Dizziness     Fosinopril Sodium Other (See Comments) and Dizziness     Mouth numbness     Amoxicillin-Pot Clavulanate Hives and Rash     Cephalosporins Hives and Rash     Penicillins Rash   :     Current Outpatient Medications   Medication Sig     acetaminophen (TYLENOL) 500 MG tablet Take 1-2 tablets (500-1,000 mg total) by mouth every 4 (four) hours as needed.     fluticasone propionate (FLONASE) 50 mcg/actuation nasal spray Apply 1 spray into each nostril 3 (three) times a day as needed. For overuse of nasal decongestant.     levoFLOXacin (LEVAQUIN) 500 MG tablet Take 1 tablet (500 mg total) by mouth Daily at 6:00 am for 7 days.     naproxen sodium (ALEVE) 220 MG tablet Take 220 mg by mouth at bedtime.     phenylephrine (NICOLE-SYNEPHRINE) 1 % Spry 1-2 sprays into each nostril every 4 (four) hours as needed.             polyethylene glycol (MIRALAX) 17 gram packet Take 1 packet (17 g total) by mouth daily.     sodium chloride (OCEAN) 0.65 % nasal spray 1 spray into each nostril as needed for congestion. Use with phenylephrine spray.         REVIEW OF SYSTEMS:    Currently, no fever, chills, or rigors. Does not have any visual or hearing problems. Denies any chest pain, headaches, palpitations, lightheadedness, dizziness, shortness of breath, or cough. Appetite is good. Denies any GERD symptoms. Denies any difficulty with swallowing, nausea, or vomiting.  Denies any abdominal pain, diarrhea or constipation. Denies any urinary symptoms other then retention,. No insomnia. No active bleeding. No rash.       PHYSICAL EXAMINATION:  Vitals:    09/05/19 1731   BP: 126/66   Pulse: (!) 59   Temp: (!) 96.3  F (35.7  C)   Weight: 171 lb 3.2 oz (77.7 kg)         GENERAL: Awake, Alert, oriented x3,unkempt in appearance,  not in any form of acute distress, answers questions appropriately, follows simple commands, conversant  HEENT: Head is normocephalic with normal hair distribution. No evidence of trauma. Ears: No acute purulent discharge. Eyes: Conjunctivae pink with no scleral jaundice. Nose: Normal mucosa and septum. NECK: Supple with no cervical or supraclavicular lymphadenopathy. Trachea is midline.   CHEST: No tenderness or deformity, no crepitus  LUNG: Clear to auscultation with good chest expansion. There are no crackles or wheezes, normal AP diameter.  BACK: No kyphosis of the thoracic spine. Symmetric, no curvature, ROM normal, no CVA tenderness, no spinal tenderness   CVS: There is good S1  S2,  rhythm is regular.  ABDOMEN: Globular and soft, nontender to palpation, non distended, no masses, no organomegaly, good bowel sounds, no rebound or guarding, no peritoneal signs.   EXTREMITIES: ROM UE WNL, he is unable to bear wt on legs. In wc. Pedal pulses present, no edema, arthritic aged joint swelling .  SKIN: Warm and dry, no  erythema noted, no rashes or lesions.  NEUROLOGICAL: The patient is oriented to person, place and time. Strength and sensation are grossly intact. Face is symmetric.                    LABS:    Lab Results   Component Value Date    WBC 8.9 08/28/2019    HGB 10.0 (L) 08/29/2019    HCT 36.7 (L) 08/28/2019    MCV 84 08/28/2019     08/28/2019       Results for orders placed or performed during the hospital encounter of 08/27/19   Basic Metabolic Panel   Result Value Ref Range    Sodium 140 136 - 145 mmol/L    Potassium 3.9 3.5 - 5.0 mmol/L    Chloride 107 98 - 107 mmol/L    CO2 23 22 - 31 mmol/L    Anion Gap, Calculation 10 5 - 18 mmol/L    Glucose 93 70 - 125 mg/dL    Calcium 9.1 8.5 - 10.5 mg/dL    BUN 18 8 - 22 mg/dL    Creatinine 0.61 (L) 0.70 - 1.30 mg/dL    GFR MDRD Af Amer >60 >60 mL/min/1.73m2    GFR MDRD Non Af Amer >60 >60 mL/min/1.73m2           No results found for: HGBA1C  No results found for: [id number]  No results found for: [id number]    ASSESSMENT/PLAN:  1. Urinary retention    2. Urinary obstruction    3. Spinal stenosis, unspecified spinal region      1. Retention and. Urinary obstruction: Valentine in place, hematuria has disapated Encouraged fluid and request nursing assure he has a secure strap in place.H e has  F/U scheduled.    2. Spinal stenosis: He has pain management in place and therapy has started diathermy which he reported today was helpful. I also encouraged him to use the Voltaren ordered on 9/4/19    Electronically signed by:  Anuradha Barrett CNP  This progress note was completed using Dragon software and there may be grammatical errors.

## 2021-06-01 NOTE — PROGRESS NOTES
[facility] Admission note      Patient: Rashad Caputo  MRN: [id number]  Date of Service: 9/19/2019      [facility] [[id number]]  Reason for Visit     Chief Complaint   Patient presents with     Review Of Multiple Medical Conditions       Code Status     Full code    Assessment     -Status post ER visit secondary to acute high muscle pain  -Severe back leg and knee pain  responding to current treatment regimen  -Acute urinary retention status post Valentine catheter placement  -Hematuria currently resolved status post bladder irrigation  -UTI with cultures growing strep viridans  -Acute back pain with underlying history of severe spinal stenoses/kyphoscoliosis with postural instability noted on exam  -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  -Vulnerable adult  -Failure to thrive with difficulty functioning in his home environment.  Multiple ER visits noted with 6 emergency room visits noted     Plan     Patient has been admitted to the TCU.  He was examined at his request.  He currently has an indwelling Valentine catheter.  He attributes his retention issues to underlying history of spinal stenoses.  He did not keep his follow-up with orthopedics needed for his knee on his back.  On questioning he told me he did not want to follow-up with them because he is having incontinence.  Similarly he did not keep a follow-up with urology because of incontinence as per him.  Pain management reviewed with him and he is reporting less sedation and more tolerable pain now that he is on a regimen with gabapentin added  along with muscle relaxers.  Overall nonambulatory requiring significant cares and is wheelchair-bound  SLUMS 19/30 COGNITIVE  impairment is suspected    History     Patient is a very pleasant 69 y.o. male who is admitted to TCU  Patient presented to the hospital with acute urinary retention.  He had a Valentine catheter placed and currently discharged with an indwelling Valentine catheter.  He will follow with urology for a voiding trial.  So far he has refused to follow-up on questioning he told me today that he did not keep the appointment because of incontinence.  He has chronic incontinence associated with constipation.  He was noted to be manually digging himself up.  Nursing has been pushing stool softeners he refuses and then takes large amount of stool softeners with resultant incontinence.  He has bone-on-bone osteoarthritis of right greater than left knee.  With significant deformity and swelling noted.  He is not very keen to do any surgical follow-up with orthopedics.  Pain management reviewed with him he is on multiple medications sedation concerns today minimal and he feels his pain is more manageable    Past Medical History     Active Ambulatory (Non-Hospital) Problems    Diagnosis     Debilitated patient     Urinary retention     Urinary obstruction     Urinary tract infection     Benign prostatic hyperplasia with lower urinary tract symptoms     Post-void dribbling     Rhinitis medicamentosa     Sepsis due to urinary tract infection (H)     Sepsis (H)     CAP (community acquired pneumonia)     CAD (coronary artery disease)     Non-STEMI (non-ST elevated myocardial infarction) (H)     Tachycardia     Anxiety     Spinal stenosis     Hypertension     Benign Prostatic Hypertrophy     Myalgia And Myositis     Urinary Tract Infection     Feelings Of Urinary Urgency     Joint Pain, Localized In The Hip     Past Medical History:   Diagnosis Date     Arthritis      Benign prostatic hyperplasia with lower urinary  tract symptoms 2/23/2017     BPH (benign prostatic hypertrophy)      Coronary artery disease      History of transfusion 1975     Hypertension      Pneumonia 1980     Post-void dribbling 2/23/2017     Retinal tear of right eye      Rhinitis medicamentosa 2/23/2017     Spinal stenosis      UTI (urinary tract infection)        Past Social History     Reviewed, and he  reports that he quit smoking about 34 years ago. He has never used smokeless tobacco. He reports that he does not drink alcohol or use drugs.    Family History     Reviewed, and includes cataracts and CVD in his father; his mother had dementia; grandmother had DM    Medication List     Current Outpatient Medications on File Prior to Visit   Medication Sig Dispense Refill     acetaminophen (TYLENOL) 500 MG tablet Take 1-2 tablets (500-1,000 mg total) by mouth every 4 (four) hours as needed.  0     cyclobenzaprine (FLEXERIL) 10 MG tablet Take 1 tablet (10 mg total) by mouth 2 (two) times a day as needed for muscle spasms. 10 tablet 0     fluticasone propionate (FLONASE) 50 mcg/actuation nasal spray Apply 1 spray into each nostril 3 (three) times a day as needed. For overuse of nasal decongestant.       magnesium oxide (MAG-OX) 400 mg (241.3 mg magnesium) tablet Take 400 mg by mouth daily.       naproxen sodium (ALEVE) 220 MG tablet Take 220 mg by mouth at bedtime.       phenylephrine (NICOLE-SYNEPHRINE) 1 % Spry 1-2 sprays into each nostril every 4 (four) hours as needed.              polyethylene glycol (MIRALAX) 17 gram packet Take 1 packet (17 g total) by mouth daily.  0     sodium chloride (OCEAN) 0.65 % nasal spray 1 spray into each nostril as needed for congestion. Use with phenylephrine spray.       No current facility-administered medications on file prior to visit.        Allergies     Allergies   Allergen Reactions     Lisinopril Shortness Of Breath and Dizziness     Fosinopril Sodium Other (See Comments) and Dizziness     Mouth numbness      Amoxicillin-Pot Clavulanate Hives and Rash     Cephalosporins Hives and Rash     Penicillins Rash       Review of Systems   A comprehensive review of 14 systems was done. Pertinent findings noted here and in history of present illness. All the rest negative.  Constitutional: Negative.  Negative for fever, chills, activity change, appetite change and fatigue.   HENT: Negative for congestion and facial swelling.    Eyes: Negative for photophobia, redness and visual disturbance.   Respiratory: Negative for cough and chest tightness.    Cardiovascular: Negative for chest pain, palpitations and leg swelling.   Gastrointestinal: Negative for nausea, diarrhea, constipation, blood in stool and abdominal distention.   Genitourinary: Negative.    Musculoskeletal: Negative.    Skin: Negative.  Hyperpigmented rescaling noted in both of his hands.  Patient told me it is from chronic handwashing  Neurological: Negative for dizziness, tremors, syncope, weakness, light-headedness and headaches.   Hematological: Does not bruise/bleed easily.   Psychiatric/Behavioral: Negative.        Physical Exam     Recent Vitals 9/18/2019   Weight 178 lbs 13 oz   BSA (m2) 1.99 m2   /84   Pulse 85   Temp 96.6   SpO2 -   Some recent data might be hidden       Constitutional: Oriented to person, place, and time and appears well-developed.   Looks very disabled and deconditioned  HEENT:  Normocephalic and atraumatic.  Eyes: Conjunctivae and EOM are normal. Pupils are equal, round, and reactive to light. No discharge.  No scleral icterus. Nose normal. Mouth/Throat: Oropharynx is clear and moist. No oropharyngeal exudate.    NECK: Normal range of motion. Neck supple. No JVD present. No tracheal deviation present. No thyromegaly present.   CARDIOVASCULAR: Normal rate, regular rhythm and intact distal pulses.  Exam reveals no gallop and no friction rub.  Systolic murmur present.  PULMONARY: Effort normal and breath sounds normal. No respiratory  distress.No Wheezing or rales.  ABDOMEN: Soft. Bowel sounds are normal. No distension and no mass.  There is no tenderness. There is no rebound and no guarding. No HSM.  Open area noted on his right buttock which appears to be chronic with hyperpigmented changes noted with irritation on his buttocks  MUSCULOSKELETAL: Normal range of motion. No edema and no tenderness. Mild kyphosis, no tenderness.  Right lower extremity is swollen with profound arthritic changes noted with swelling in his right knee.  On standing leg is crooked and patient is not able to stand up straight.  Profound kyphoscoliosis of his back also noted and he could not straighten himself up he was in severe pain  LYMPH NODES: Has no cervical, supraclavicular, axillary and groin adenopathy.   NEUROLOGICAL: Alert and oriented to person, place, and time. No cranial nerve deficit.  Normal muscle tone. Coordination normal.   GENITOURINARY: Deferred exam.  Valnetine present  SKIN: Skin is warm and dry. No rash noted. No erythema. No pallor.   Hypopigmentary changes noted in his both fingers and hands which patient attributes to chronic handwashing  EXTREMITIES: No cyanosis, no clubbing, no edema. No Deformity.  PSYCHIATRIC: Normal mood, affect and behavior.  Has chronic anxiety      Lab Results       Lab Results   Component Value Date    ALBUMIN 4.0 02/15/2019    ALT 19 02/15/2019    AST 19 02/15/2019    BUN 22 09/08/2019    CALCIUM 9.3 09/08/2019     09/08/2019    CHOL 132 10/19/2014    CO2 25 09/08/2019    CREATININE 0.70 09/11/2019    GFRAA >60 09/11/2019    GFRNONAA >60 09/11/2019     09/08/2019    HCT 35.2 (L) 09/08/2019    WBC 7.3 09/08/2019    HGB 11.4 (L) 09/08/2019    MG 1.8 09/08/2019     09/08/2019    K 4.6 09/08/2019    PSA 4.5 02/15/2013     09/08/2019             Post Discharge Medication Reconciliation Status: discharge medications reconciled and changed, per note/orders (see AVS)      NICKOLAS Alvarado

## 2021-06-01 NOTE — TELEPHONE ENCOUNTER
[facility] Nurse Triage Telephone Note      Provider: KIMI Chamorro  Facility: [facility]    Facility Type: TCU    Caller: Luis  Call Back Number:  [phone]    Allergies: Lisinopril; Fosinopril sodium; Amoxicillin-pot clavulanate; Cephalosporins; and Penicillins    Reason for call: Nurse calling to report Heme 2, BMP, and Mg levels.       Verbal Order/Direction given by Provider: Start magnesium oxide 400mg daily.      Provider giving order: KIMI Chamorro    Verbal order given to: Luis Omalley RN

## 2021-06-01 NOTE — PROGRESS NOTES
[facility] Admission note      Patient: Rashad Caputo  MRN: [id number]  Date of Service: 9/12/2019      [facility] [[id number]]  Reason for Visit     Chief Complaint   Patient presents with     Review Of Multiple Medical Conditions       Code Status     Full code    Assessment     -Status post ER visit secondary to acute high muscle pain  -Severe back leg and knee pain  responding to current treatment regimen  -Acute urinary retention status post Valentine catheter placement  -Hematuria currently resolved status post bladder irrigation  -UTI with cultures growing strep viridans  -Acute back pain with underlying history of severe spinal stenoses/kyphoscoliosis with postural instability noted on exam  -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  -Vulnerable adult  -Failure to thrive with difficulty functioning in his home environment.  Multiple ER visits noted with 6 emergency room visits noted     Plan     Patient has been admitted to the TCU.  Pain management reviewed with him.  He is doing a little better with improvement in his pain and muscle aches reported with scheduled Tylenol and NSAIDs along with gabapentin and the addition of Zanaflex for muscle relaxers.  He is reporting some sedation concerns with these medications.  At present my plan is to continue with his meds for the weekend and reassess him next week if he has improvement will increase the dosage or continue the same regimen but otherwise discontinued if he has persistent sedation or confusion concerns.  Again encouraged him to see  orthopedics for consideration of evaluation and possible surgical intervention for his knee as well as back.    He remains resistant to the idea.  UA appears to be dirty he probably has a UTI and will need treatment but again does not want a catheter exchange done will await urology's recommendation for him.  Advised frequent repositioning due to underlying history of sacral pressure sore.  Continue with his rehab   he is looking for placement    History     Patient is a very pleasant 69 y.o. male who is admitted to TCU  Patient presented to the hospital with acute urinary retention.  He had a Valentine catheter placed and currently discharged with an indwelling Valentine catheter.  He will follow with urology for a voiding trial.  Due to hematuria concerns with patient reporting some dysuria UA UC has been drawn.  It appears to be contaminated but with moderate bacteria cultures pending.  He is concerned that he has a UTI.  Unfortunately he does not want a catheter exchange done.  He was reporting significant myalgias and a CK was done which is normal.  At present he was given gabapentin as well as scheduled Zanaflex for pain management he reports that he is also on Tylenol and NSAIDs and has had some improvement he was able to handle his pain better last night.  Again he is high risk for opioid dependence and we are trying to avoid narcotics.  He is in agreement with that plan again had a discussion with him about following up with orthopedics to discuss options for his advanced osteoarthritis of his knee with profound deformity and severe kyphoscoliosis of his back again associated profound deformity and limited mobility.  Constipation issues remain at baseline he is refusing stool softeners    Past Medical History     Active Ambulatory (Non-Hospital) Problems    Diagnosis     Urinary retention     Urinary obstruction     Urinary tract infection     Benign prostatic hyperplasia with lower urinary tract symptoms      Post-void dribbling     Rhinitis medicamentosa     Sepsis due to urinary tract infection (H)     Sepsis (H)     CAP (community acquired pneumonia)     CAD (coronary artery disease)     Non-STEMI (non-ST elevated myocardial infarction) (H)     Tachycardia     Anxiety     Spinal stenosis     Hypertension     Benign Prostatic Hypertrophy     Myalgia And Myositis     Urinary Tract Infection     Feelings Of Urinary Urgency     Joint Pain, Localized In The Hip     Past Medical History:   Diagnosis Date     Arthritis      Benign prostatic hyperplasia with lower urinary tract symptoms 2/23/2017     BPH (benign prostatic hypertrophy)      Coronary artery disease      History of transfusion 1975     Hypertension      Pneumonia 1980     Post-void dribbling 2/23/2017     Retinal tear of right eye      Rhinitis medicamentosa 2/23/2017     Spinal stenosis      UTI (urinary tract infection)        Past Social History     Reviewed, and he  reports that he quit smoking about 34 years ago. He has never used smokeless tobacco. He reports that he does not drink alcohol or use drugs.    Family History     Reviewed, and includes cataracts and CVD in his father; his mother had dementia; grandmother had DM    Medication List     Current Outpatient Medications on File Prior to Visit   Medication Sig Dispense Refill     acetaminophen (TYLENOL) 500 MG tablet Take 1-2 tablets (500-1,000 mg total) by mouth every 4 (four) hours as needed.  0     cyclobenzaprine (FLEXERIL) 10 MG tablet Take 1 tablet (10 mg total) by mouth 2 (two) times a day as needed for muscle spasms. 10 tablet 0     fluticasone propionate (FLONASE) 50 mcg/actuation nasal spray Apply 1 spray into each nostril 3 (three) times a day as needed. For overuse of nasal decongestant.       magnesium oxide (MAG-OX) 400 mg (241.3 mg magnesium) tablet Take 400 mg by mouth daily.       naproxen sodium (ALEVE) 220 MG tablet Take 220 mg by mouth at bedtime.       phenylephrine  (NICOLE-SYNEPHRINE) 1 % Spry 1-2 sprays into each nostril every 4 (four) hours as needed.              polyethylene glycol (MIRALAX) 17 gram packet Take 1 packet (17 g total) by mouth daily.  0     sodium chloride (OCEAN) 0.65 % nasal spray 1 spray into each nostril as needed for congestion. Use with phenylephrine spray.       No current facility-administered medications on file prior to visit.        Allergies     Allergies   Allergen Reactions     Lisinopril Shortness Of Breath and Dizziness     Fosinopril Sodium Other (See Comments) and Dizziness     Mouth numbness     Amoxicillin-Pot Clavulanate Hives and Rash     Cephalosporins Hives and Rash     Penicillins Rash       Review of Systems   A comprehensive review of 14 systems was done. Pertinent findings noted here and in history of present illness. All the rest negative.  Constitutional: Negative.  Negative for fever, chills, activity change, appetite change and fatigue.   HENT: Negative for congestion and facial swelling.    Eyes: Negative for photophobia, redness and visual disturbance.   Respiratory: Negative for cough and chest tightness.    Cardiovascular: Negative for chest pain, palpitations and leg swelling.   Gastrointestinal: Negative for nausea, diarrhea, constipation, blood in stool and abdominal distention.   Genitourinary: Negative.    Musculoskeletal: Negative.    Skin: Negative.  Hyperpigmented rescaling noted in both of his hands.  Patient told me it is from chronic handwashing  Neurological: Negative for dizziness, tremors, syncope, weakness, light-headedness and headaches.   Hematological: Does not bruise/bleed easily.   Psychiatric/Behavioral: Negative.        Physical Exam     Recent Vitals 9/8/2019   Height -   Weight -   BSA (m2) -   BP -   Pulse -   Temp 98.6   Temp src -   SpO2 -   Some recent data might be hidden     Weight is 171 pounds.  Blood pressure 131/88 temp 98 pulse 82  Constitutional: Oriented to person, place, and time and  appears well-developed.   Looks very disabled and deconditioned  HEENT:  Normocephalic and atraumatic.  Eyes: Conjunctivae and EOM are normal. Pupils are equal, round, and reactive to light. No discharge.  No scleral icterus. Nose normal. Mouth/Throat: Oropharynx is clear and moist. No oropharyngeal exudate.    NECK: Normal range of motion. Neck supple. No JVD present. No tracheal deviation present. No thyromegaly present.   CARDIOVASCULAR: Normal rate, regular rhythm and intact distal pulses.  Exam reveals no gallop and no friction rub.  Systolic murmur present.  PULMONARY: Effort normal and breath sounds normal. No respiratory distress.No Wheezing or rales.  ABDOMEN: Soft. Bowel sounds are normal. No distension and no mass.  There is no tenderness. There is no rebound and no guarding. No HSM.  Open area noted on his right buttock which appears to be chronic with hyperpigmented changes noted with irritation on his buttocks  MUSCULOSKELETAL: Normal range of motion. No edema and no tenderness. Mild kyphosis, no tenderness.  Right lower extremity is swollen with profound arthritic changes noted with swelling in his right knee.  On standing leg is crooked and patient is not able to stand up straight.  Profound kyphoscoliosis of his back also noted and he could not straighten himself up he was in severe pain  LYMPH NODES: Has no cervical, supraclavicular, axillary and groin adenopathy.   NEUROLOGICAL: Alert and oriented to person, place, and time. No cranial nerve deficit.  Normal muscle tone. Coordination normal.   GENITOURINARY: Deferred exam.  Valentine present  SKIN: Skin is warm and dry. No rash noted. No erythema. No pallor.   Hypopigmentary changes noted in his both fingers and hands which patient attributes to chronic handwashing  EXTREMITIES: No cyanosis, no clubbing, no edema. No Deformity.  PSYCHIATRIC: Normal mood, affect and behavior.  Has chronic anxiety      Lab Results       Lab Results   Component Value  Date    ALBUMIN 4.0 02/15/2019    ALT 19 02/15/2019    AST 19 02/15/2019    BUN 22 09/08/2019    CALCIUM 9.3 09/08/2019     09/08/2019    CHOL 132 10/19/2014    CO2 25 09/08/2019    CREATININE 0.70 09/11/2019    GFRAA >60 09/11/2019    GFRNONAA >60 09/11/2019     09/08/2019    HCT 35.2 (L) 09/08/2019    WBC 7.3 09/08/2019    HGB 11.4 (L) 09/08/2019    MG 1.8 09/08/2019     09/08/2019    K 4.6 09/08/2019    PSA 4.5 02/15/2013     09/08/2019             Post Discharge Medication Reconciliation Status: discharge medications reconciled and changed, per note/orders (see AVS)      NICKOLAS Alvarado

## 2021-06-01 NOTE — PROGRESS NOTES
[facility]      NAME:  Rashad Caputo             :  1950    MRN: [id number]    CODE STATUS:  FULL CODE    FACILITY: [facility] [[id number]]         CHIEF COMPLAIN/REASON FOR VISIT:  Chief Complaint   Patient presents with     Review Of Multiple Medical Conditions       HISTORY OF PRESENT ILLNESS: Rashad Caputo is a 69 y.o. male being seen for review of multiple medical conditions.. Patient presented to the hospital with acute urinary retention.  He had a Shen catheter placed and currently discharged with an indwelling Shen catheter.  He will follow with urology for a voiding trial. He was noted to have hematuria on , improved today. He developed a UTI cultures grew strep viridans.  He was given Levaquin 7 days post discharge, now completed but has an increase of hematuria again today. D/T this, he cancelled his  apt. I am going to obtain ua, but he refuses shen change. He remains in a weakened deconditioned state    Allergies   Allergen Reactions     Lisinopril Shortness Of Breath and Dizziness     Fosinopril Sodium Other (See Comments) and Dizziness     Mouth numbness     Amoxicillin-Pot Clavulanate Hives and Rash     Cephalosporins Hives and Rash     Penicillins Rash   :     Current Outpatient Medications   Medication Sig     acetaminophen (TYLENOL) 500 MG tablet Take 1-2 tablets (500-1,000 mg total) by mouth every 4 (four) hours as needed.     cyclobenzaprine (FLEXERIL) 10 MG tablet Take 1 tablet (10 mg total) by mouth 2 (two) times a day as needed for muscle spasms.     fluticasone propionate (FLONASE) 50 mcg/actuation nasal spray Apply 1 spray into each nostril 3 (three) times a day as needed. For overuse of nasal decongestant.     magnesium oxide (MAG-OX) 400 mg (241.3 mg magnesium) tablet Take 400 mg by mouth daily.     naproxen sodium (ALEVE) 220 MG tablet Take 220 mg by mouth at bedtime.     phenylephrine (NICOLE-SYNEPHRINE) 1 % Spry 1-2 sprays  into each nostril every 4 (four) hours as needed.            polyethylene glycol (MIRALAX) 17 gram packet Take 1 packet (17 g total) by mouth daily.     sodium chloride (OCEAN) 0.65 % nasal spray 1 spray into each nostril as needed for congestion. Use with phenylephrine spray.         REVIEW OF SYSTEMS:    Currently, no fever, chills, or rigors. Does not have any visual or hearing problems. Denies any chest pain, headaches, palpitations, lightheadedness, dizziness, shortness of breath, or cough. Appetite is good. Denies any GERD symptoms. Denies any difficulty with swallowing, nausea, or vomiting.  Denies any abdominal pain, was constipated now with loose stools d/t prune juice. Denies any urinary symptoms other then retention,. No insomnia. No active bleeding. No rash.       PHYSICAL EXAMINATION:  Vitals:    09/18/19 0549   BP: 149/84   Pulse: 85   Temp: 96.6  F (35.9  C)   Weight: 178 lb 12.8 oz (81.1 kg)         GENERAL: Awake, Alert, oriented x3,unkempt in appearance,  not in any form of acute distress, answers questions appropriately, follows simple commands, conversant  HEENT: Head is normocephalic with normal hair distribution. No evidence of trauma. Ears: No acute purulent discharge. Eyes: Conjunctivae pink with no scleral jaundice. Nose: Normal mucosa and septum. NECK: Supple with no cervical or supraclavicular lymphadenopathy. Trachea is midline.   CHEST: No tenderness or deformity, no crepitus  LUNG: Clear to auscultation with good chest expansion. There are no crackles or wheezes, normal AP diameter.  BACK: No kyphosis of the thoracic spine. Symmetric, no curvature, ROM normal, no CVA tenderness, no spinal tenderness   CVS: There is good S1  S2,  rhythm is regular.  ABDOMEN: Globular and soft, nontender to palpation, non distended, no masses, no organomegaly, good bowel sounds, no rebound or guarding, no peritoneal signs.   EXTREMITIES: ROM UE WNL, he is unable to bear wt on legs. In wc. Pedal pulses  present, no edema, arthritic aged joint swelling, right knee very edematous. .  SKIN: Warm and dry, no erythema noted, no rashes or lesions.  NEUROLOGICAL: The patient is oriented to person, place and time. Strength and sensation are grossly intact. Face is symmetric.                  LABS:    Lab Results   Component Value Date    WBC 7.3 09/08/2019    HGB 11.4 (L) 09/08/2019    HCT 35.2 (L) 09/08/2019    MCV 82 09/08/2019     09/08/2019       Results for orders placed or performed during the hospital encounter of 09/08/19   Basic Metabolic Panel   Result Value Ref Range    Sodium 136 136 - 145 mmol/L    Potassium 4.6 3.5 - 5.0 mmol/L    Chloride 104 98 - 107 mmol/L    CO2 25 22 - 31 mmol/L    Anion Gap, Calculation 7 5 - 18 mmol/L    Glucose 101 70 - 125 mg/dL    Calcium 9.3 8.5 - 10.5 mg/dL    BUN 22 8 - 22 mg/dL    Creatinine 0.65 (L) 0.70 - 1.30 mg/dL    GFR MDRD Af Amer >60 >60 mL/min/1.73m2    GFR MDRD Non Af Amer >60 >60 mL/min/1.73m2           No results found for: HGBA1C  No results found for: [id number]  No results found for: [id number]    ASSESSMENT/PLAN:  1. Sepsis due to urinary tract infection (H)    2. Urinary retention    3. Debilitated patient      1. Retention and. Urinary obstruction/ sepsis : Shen in place, hematuria has disapated Encouraged fluid and request nursing assure he has a secure strap in place.H e has  F/U was cancelled. He is now completed ABX regime, we will recheck UA d/t hematuria    2.Generalized deconditioned state: Continue [facility] TCU protocols and POC for therapies. SN services for shen management, med management and chronic disease managment  Electronically signed by:  Anuradha Barrett CNP  This progress note was completed using Dragon software and there may be grammatical errors.

## 2021-06-02 NOTE — PROGRESS NOTES
[facility]      NAME:  Rashad Caputo             :  1950    MRN: [id number]    CODE STATUS:  FULL CODE    FACILITY: [facility] [[id number]]         CHIEF COMPLAIN/REASON FOR VISIT:  Chief Complaint   Patient presents with     Review Of Multiple Medical Conditions       HISTORY OF PRESENT ILLNESS: Rashad Caputo is a 69 y.o. male being seen for review of multiple medical conditions. Patient presented to the hospital with acute urinary retention.  He had a Shen catheter placed and currently discharged with an indwelling Shen catheter.  He will follow with urology for a voiding trial. He was noted to have hematuria on , improved today. He developed a UTI cultures grew strep viridans.  He was given Levaquin 7 days post discharge.He has bowel issues of fecal incontinence, ongoing. Reports some pain to left knee, managed with analgesics.   He was to go to  for Folay removal today, but once again cancelled his apt. He was educated ( again) on risk benefits of prolonged catheter use. He reports to me that he promises to have it removed by next Tuesday.   He does continue with therapies and SN on TCU for management of chronic disease status as well as assist in ADLS and bladder care.We did have a lengthy discussion to discuss Jarek ongoing cancelling of  apts and need for shen removal.    Allergies   Allergen Reactions     Lisinopril Shortness Of Breath and Dizziness     Fosinopril Sodium Other (See Comments) and Dizziness     Mouth numbness     Amoxicillin-Pot Clavulanate Hives and Rash     Cephalosporins Hives and Rash     Penicillins Rash   :     Current Outpatient Medications   Medication Sig     acetaminophen (TYLENOL) 500 MG tablet Take 1-2 tablets (500-1,000 mg total) by mouth every 4 (four) hours as needed.     cyclobenzaprine (FLEXERIL) 10 MG tablet Take 1 tablet (10 mg total) by mouth 2 (two) times a day as needed for muscle spasms.     fluticasone  propionate (FLONASE) 50 mcg/actuation nasal spray Apply 1 spray into each nostril 3 (three) times a day as needed. For overuse of nasal decongestant.     magnesium oxide (MAG-OX) 400 mg (241.3 mg magnesium) tablet Take 400 mg by mouth daily.     melatonin 3 mg Tab tablet Take 5 mg by mouth at bedtime as needed.     naproxen sodium (ALEVE) 220 MG tablet Take 220 mg by mouth at bedtime.     phenylephrine (NICOLE-SYNEPHRINE) 1 % Spry 1-2 sprays into each nostril every 4 (four) hours as needed.            polyethylene glycol (MIRALAX) 17 gram packet Take 1 packet (17 g total) by mouth daily.     sodium chloride (OCEAN) 0.65 % nasal spray 1 spray into each nostril as needed for congestion. Use with phenylephrine spray.         REVIEW OF SYSTEMS:    Currently, no fever, chills, or rigors. Does not have any visual or hearing problems. Denies any chest pain, headaches, palpitations, lightheadedness, dizziness, shortness of breath, or cough. Appetite is good. Denies any GERD symptoms. Denies any difficulty with swallowing, nausea, or vomiting.  Denies any abdominal pain, was constipated now with loose stools d/t prune juice. Denies any urinary symptoms other then retention,. No insomnia. No active bleeding other then ongoing hematuria. No rash.       PHYSICAL EXAMINATION:  Vitals:    10/08/19 1956   BP: 146/83   Pulse: 92   Temp: 98.4  F (36.9  C)   Weight: 198 lb 6.4 oz (90 kg)         GENERAL: Awake, Alert, oriented x3,unkempt in appearance,  not in any form of acute distress, answers questions appropriately, follows simple commands, conversant  HEENT: Head is normocephalic with normal hair distribution. No evidence of trauma. Ears: No acute purulent discharge. Eyes: Conjunctivae pink with no scleral jaundice. Nose: Normal mucosa and septum. NECK: Supple with no cervical or supraclavicular lymphadenopathy. Trachea is midline.   CHEST: No tenderness or deformity, no crepitus  LUNG: Clear to auscultation with good chest  expansion. There are no crackles or wheezes, normal AP diameter.  BACK: No kyphosis of the thoracic spine. Symmetric, no curvature, ROM normal, no CVA tenderness, no spinal tenderness   CVS: There is good S1  S2,  rhythm is regular.  ABDOMEN: Globular and soft, nontender to palpation, non distended, no masses, no organomegaly, good bowel sounds, no rebound or guarding, no peritoneal signs.   EXTREMITIES: ROM UE WNL, he is unable to bear wt on legs. In wc. Pedal pulses present, no edema, arthritic aged joint swelling, right knee very edematous. .Trace edema bilaterally  SKIN: Warm and dry, no erythema noted, no rashes or lesions.  NEUROLOGICAL: The patient is oriented to person, place and time. Strength and sensation are grossly intact. Face is symmetric.          LABS:    Lab Results   Component Value Date    WBC 7.3 09/08/2019    HGB 11.4 (L) 09/08/2019    HCT 35.2 (L) 09/08/2019    MCV 82 09/08/2019     09/08/2019       Results for orders placed or performed in visit on 10/01/19   Basic Metabolic Panel   Result Value Ref Range    Sodium 139 136 - 145 mmol/L    Potassium 4.4 3.5 - 5.0 mmol/L    Chloride 105 98 - 107 mmol/L    CO2 26 22 - 31 mmol/L    Anion Gap, Calculation 8 5 - 18 mmol/L    Glucose 87 70 - 125 mg/dL    Calcium 9.2 8.5 - 10.5 mg/dL    BUN 31 (H) 8 - 22 mg/dL    Creatinine 0.71 0.70 - 1.30 mg/dL    GFR MDRD Af Amer >60 >60 mL/min/1.73m2    GFR MDRD Non Af Amer >60 >60 mL/min/1.73m2           No results found for: HGBA1C  No results found for: [id number]  No results found for: [id number]    ASSESSMENT/PLAN:  1. Urinary retention    2. Anxiety      1.  Retention and. Urinary obstruction: Shen in place, no hematuria  He has been cancelling his  APTS.We had a lengthy discussion R/T need to have shen removed, risk benefits of infections are so much greater with indwelling cath. He continues to be non compliant with keeping  apt and refuses nurses to allow a trial    2 Anxiety: Reviewed ot  anxiety and as per Dr Cisneros suggestions would like pt to start anti anxiety. Pt agin reuses any intervention. SW is going to re address this with pt as I would recommend we start some paxil but pt not willing to try..    Electronically signed by:  Anuradha Barrett CNP  This progress note was completed using Dragon software and there may be grammatical errors.

## 2021-06-02 NOTE — PROGRESS NOTES
[facility]      NAME:  Rashad Caputo             :  1950    MRN: [id number]    CODE STATUS:  FULL CODE    FACILITY: [facility] [[id number]]         CHIEF COMPLAIN/REASON FOR VISIT:  Chief Complaint   Patient presents with     Review Of Multiple Medical Conditions       HISTORY OF PRESENT ILLNESS: Rashad Caputo is a 69 y.o. male being seen for review of multiple medical conditions. Patient presented to the hospital with acute urinary retention.  He had a Valentine catheter placed and currently discharged with an indwelling Valentine catheter.  He will follow with urology for a voiding trial. He was noted to have hematuria on , improved today. He developed a UTI cultures grew strep viridans.  He was given Levaquin 7 days post discharge.He has bowel issues of fecal incontinence, ongoing. Reports some pain to left knee, managed with analgesics.   He was to go to  for Folay removal today, but once again cancelled his apt. He was educated ( again) on risk benefits of prolonged catheter use. He reports to me that he promises to have it removed by next Tuesday.   He does continue with therapies and SN on TCU for management of chronic disease status as well as assist in ADLS and bladder care. Sw has been searching for dc planning as pt will require AL. Rashad reports overall feeling of well being with pain managed.    Allergies   Allergen Reactions     Lisinopril Shortness Of Breath and Dizziness     Fosinopril Sodium Other (See Comments) and Dizziness     Mouth numbness     Amoxicillin-Pot Clavulanate Hives and Rash     Cephalosporins Hives and Rash     Penicillins Rash   :     Current Outpatient Medications   Medication Sig     acetaminophen (TYLENOL) 500 MG tablet Take 1-2 tablets (500-1,000 mg total) by mouth every 4 (four) hours as needed.     cyclobenzaprine (FLEXERIL) 10 MG tablet Take 1 tablet (10 mg total) by mouth 2 (two) times a day as needed for muscle spasms.      fluticasone propionate (FLONASE) 50 mcg/actuation nasal spray Apply 1 spray into each nostril 3 (three) times a day as needed. For overuse of nasal decongestant.     furosemide (LASIX) 20 MG tablet Take 20 mg by mouth daily.     magnesium oxide (MAG-OX) 400 mg (241.3 mg magnesium) tablet Take 400 mg by mouth daily.     melatonin 3 mg Tab tablet Take 5 mg by mouth at bedtime as needed.     naproxen sodium (ALEVE) 220 MG tablet Take 220 mg by mouth at bedtime.     phenylephrine (NICOLE-SYNEPHRINE) 1 % Spry 1-2 sprays into each nostril every 4 (four) hours as needed.            polyethylene glycol (MIRALAX) 17 gram packet Take 1 packet (17 g total) by mouth daily.     sodium chloride (OCEAN) 0.65 % nasal spray 1 spray into each nostril as needed for congestion. Use with phenylephrine spray.     tamsulosin (FLOMAX) 0.4 mg cap Take 0.4 mg by mouth.         REVIEW OF SYSTEMS:    Currently, no fever, chills, or rigors. Does not have any visual or hearing problems. Denies any chest pain, headaches, palpitations, lightheadedness, dizziness, shortness of breath, or cough. Appetite is good. Denies any GERD symptoms. Denies any difficulty with swallowing, nausea, or vomiting.  Denies any abdominal pain, was constipated now with loose stools d/t prune juice. Denies any urinary symptoms other then retention,. No insomnia. No active bleeding other then ongoing hematuria. No rash.       PHYSICAL EXAMINATION:  Vitals:    10/17/19 2145   BP: 155/86   Pulse: 97   Temp: 97.2  F (36.2  C)   Weight: 218 lb (98.9 kg)         GENERAL: Awake, Alert, oriented x3,unkempt in appearance,  not in any form of acute distress, answers questions appropriately, follows simple commands, conversant  HEENT: Head is normocephalic with normal hair distribution. No evidence of trauma. Ears: No acute purulent discharge. Eyes: Conjunctivae pink with no scleral jaundice. Nose: Normal mucosa and septum. NECK: Supple with no cervical or supraclavicular  lymphadenopathy. Trachea is midline.   CHEST: No tenderness or deformity, no crepitus  LUNG: Clear to auscultation with good chest expansion. There are no crackles or wheezes, normal AP diameter.  BACK: No kyphosis of the thoracic spine. Symmetric, no curvature, ROM normal, no CVA tenderness, no spinal tenderness   CVS: There is good S1  S2,  rhythm is regular.  ABDOMEN: Globular and soft, nontender to palpation, non distended, no masses, no organomegaly, good bowel sounds, no rebound or guarding, no peritoneal signs.   EXTREMITIES: ROM UE WNL, he is unable to bear wt on legs. In wc. Pedal pulses present, pedal  edema, arthritic aged joint swelling, right knee very edematous. .Trace edema bilaterally  SKIN: Warm and dry, no erythema noted, no rashes or lesions.  NEUROLOGICAL: The patient is oriented to person, place and time. Strength and sensation are grossly intact. Face is symmetric.          LABS:    Lab Results   Component Value Date    WBC 8.6 10/10/2019    HGB 8.9 (L) 10/10/2019    HCT 28.8 (L) 10/10/2019    MCV 85 10/10/2019     10/10/2019       Results for orders placed or performed in visit on 10/17/19   Basic Metabolic Panel   Result Value Ref Range    Sodium 140 136 - 145 mmol/L    Potassium 4.6 3.5 - 5.0 mmol/L    Chloride 106 98 - 107 mmol/L    CO2 29 22 - 31 mmol/L    Anion Gap, Calculation 5 5 - 18 mmol/L    Glucose 97 70 - 125 mg/dL    Calcium 8.9 8.5 - 10.5 mg/dL    BUN 34 (H) 8 - 22 mg/dL    Creatinine 0.69 (L) 0.70 - 1.30 mg/dL    GFR MDRD Af Amer >60 >60 mL/min/1.73m2    GFR MDRD Non Af Amer >60 >60 mL/min/1.73m2           No results found for: HGBA1C  No results found for: [id number]  No results found for: [id number]    ASSESSMENT/PLAN:  1. Debilitated patient    2. Urinary obstruction    3. Urinary retention      1. Debilitated pt: On TCU, , w/c for mobility. SW continues to assist with dc planning, will need AL placement on dc.    2/3.  Retention and. Urinary obstruction: Valentine in  place, no hematuria  Urine clear straw color.Is followed by , last cath changed last week at  clinic.         Electronically signed by:  Anuradha Barrett CNP  This progress note was completed using Dragon software and there may be grammatical errors.

## 2021-06-02 NOTE — PROGRESS NOTES
[facility]    DATE: 10/14/2019    NAME:  Rashad Caputo             :  1950  MRN: [id number]  CODE STATUS:  POLST AVAILABLE    VISIT TYPE: Problem Visit     FACILITY:  [facility] [[id number]]       CHIEF COMPLAIN/REASON FOR VISIT:    Chief Complaint   Patient presents with     Problem Visit               HISTORY OF PRESENT ILLNESS: Rashad Caputo is a 69 y.o. male who was admitted - for hematuria with urinary retention. He had a shen cathter placed and treated for UTI. His culture grew viridans strep bacteria and was treated with levaquin. 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 He was discharged to TCU. He has PMH of spinal stenosis, pressure ulcers, osteoarthritis of right knee, failure to thrive, BPH, malnutrition, CAD, anemia, constipation. Prior to this he had lived at home.     Today Mr. Caputo is seen per urgent request of nursing for significant weight gain. His weights have trended from 170lbs end of August to 218lbs today. Nursing notes he had a 48lb weight gain in 44 days. They are very concerned about fluid overload and significant edema to legs. Nursing feels his edema now extends to his hips. Patient also requested to be seen today as he is also very concerned and even considering transfer to Ed. He has tubigrips in place but he is now too edematous for even the largest size the facility has so they had to be cut in order to place them today. He does not elevate his legs much as he sits in the wheelchair most of the day. He was given one dose of lasix a few weeks ago and lost 1 lb but felt his weight gain was not fluid related. Staff report his appetite is good but not exaggerated. On exam he is seen in his wheelchair with legs dependent. He notes that his weights have been increasing for some time and he has been quite concerned about his swelling. He feels his hips are even tight and swollen now. His legs are so  tight they are beginning to ache and be painful. He says he has never taken diuretics regularly but he has intermittently even over the last few years. He denies having shortness of breath and does not have a cough. He does not feel congested in his lungs but does have nasal and sinus congestion. He says he was abusing Afrin or using it every day for 3 years and now has the rebound congestion. He declines trialing any other sprays or treatments for the congestion at this time. He does say he is not sleeping and has some issues with anxiety or nervous ness at times. Nursing at bedside mentions that they had recommended sleep aids and something for anxiety at night to help him sleep but he has refused. He says he does not like taking meds. He has melatonin as needed but it does not help. We discussed some different options and he says he may be willing to try trazodone but wants to think about this. He was noted to eat about half of his lunch tray in room. He denies dizziness, fevers, nausea, stomach upset. His bowels are moving. He has his shen in still and says they are going to do a void trial in a couple weeks. He does have congestion and pressure in ears but does not like debrox and only likes the pressurized stuff they use at the ENT office. He has an appt with them on 10/22. He and nursing both feel his weights are accurate as it has been a steady climb since admission. He admits his weight was down and appetite a lot less at home before but he does not feel that he is eating excessively now. His abdomen is not distended at all. He and nursing both very concerned today.     REVIEW OF SYSTEMS:  PROBLEMS AND REVIEW OF SYSTEMS:   Today on ROS:   Currently, no fever, chills, or rigors. Does not have any visual or hearing problems. Denies any chest pain, headaches, palpitations, lightheadedness, dizziness, shortness of breath, or cough. Appetite is good. Denies any GERD symptoms. Denies any difficulty with  swallowing, nausea, or vomiting.  Denies any abdominal pain, diarrhea or constipation. No active bleeding. No rash. Positive for weakness, worsening leg swelling, fluid overload, insomnia, anxiety, shen in place      Allergies   Allergen Reactions     Lisinopril Shortness Of Breath and Dizziness     Fosinopril Sodium Other (See Comments) and Dizziness     Mouth numbness     Amoxicillin-Pot Clavulanate Hives and Rash     Cephalosporins Hives and Rash     Penicillins Rash     Current Outpatient Medications   Medication Sig     furosemide (LASIX) 20 MG tablet Take 20 mg by mouth daily.     acetaminophen (TYLENOL) 500 MG tablet Take 1-2 tablets (500-1,000 mg total) by mouth every 4 (four) hours as needed.     cyclobenzaprine (FLEXERIL) 10 MG tablet Take 1 tablet (10 mg total) by mouth 2 (two) times a day as needed for muscle spasms.     fluticasone propionate (FLONASE) 50 mcg/actuation nasal spray Apply 1 spray into each nostril 3 (three) times a day as needed. For overuse of nasal decongestant.     magnesium oxide (MAG-OX) 400 mg (241.3 mg magnesium) tablet Take 400 mg by mouth daily.     melatonin 3 mg Tab tablet Take 5 mg by mouth at bedtime as needed.     naproxen sodium (ALEVE) 220 MG tablet Take 220 mg by mouth at bedtime.     phenylephrine (NICOLE-SYNEPHRINE) 1 % Spry 1-2 sprays into each nostril every 4 (four) hours as needed.            polyethylene glycol (MIRALAX) 17 gram packet Take 1 packet (17 g total) by mouth daily.     sodium chloride (OCEAN) 0.65 % nasal spray 1 spray into each nostril as needed for congestion. Use with phenylephrine spray.     tamsulosin (FLOMAX) 0.4 mg cap Take 0.4 mg by mouth.     Past Medical History:    Past Medical History:   Diagnosis Date     Arthritis      Benign prostatic hyperplasia with lower urinary tract symptoms 2/23/2017     BPH (benign prostatic hypertrophy)      Coronary artery disease      History of transfusion 1975     Hypertension      Pneumonia 1980    had scar  tissue, double pneumonia     Post-void dribbling 2/23/2017     Retinal tear of right eye      Rhinitis medicamentosa 2/23/2017     Spinal stenosis      UTI (urinary tract infection)            PHYSICAL EXAMINATION  Vitals:    10/14/19 0700   BP: 122/71   Pulse: 88   Resp: 18   Temp: 97.8  F (36.6  C)   SpO2: 98%   Weight: 218 lb (98.9 kg)       Today on physical exam:     GENERAL: Awake, Alert, oriented x3, unkempt appearance, poor hygiene, not in any form of acute distress, answers questions appropriately, follows simple commands, conversant  HEENT: Head is normocephalic with normal hair distribution. No evidence of trauma. Ears: No acute purulent discharge. Eyes: Conjunctivae pink with no scleral jaundice. Nose: Normal mucosa and septum. NECK: Supple with no cervical or supraclavicular lymphadenopathy. Trachea is midline.   CHEST: No tenderness or deformity, no crepitus  LUNG: dim with scant left lower lobe crackle otherwise clear to auscultation with good chest expansion.   BACK: No kyphosis of the thoracic spine. Symmetric, no curvature, ROM normal, no CVA tenderness, no spinal tenderness   CVS: There is good S1  S2, regular rhythm, there are no murmurs, rubs, gallops, or heaves,  2+ pulses symmetric in all extremities.  ABDOMEN: Rounded and soft, nontender to palpation, non distended, no masses, no organomegaly, good bowel sounds, no rebound or guarding, no peritoneal signs.   EXTREMITIES: 3-4+ ble pitting edema from toes to hips, tubigrips, no wounds, few scabbed blisters noted.  SKIN: Warm and dry, no erythema noted.  Skin color, texture, no rashes or lesions.  NEUROLOGICAL: The patient is oriented to person, place and time. Strength and sensation are grossly intact. Face is symmetric.            LABS:   Recent Results (from the past 168 hour(s))   Basic Metabolic Panel   Result Value Ref Range    Sodium 139 136 - 145 mmol/L    Potassium 4.5 3.5 - 5.0 mmol/L    Chloride 109 (H) 98 - 107 mmol/L    CO2 25 22 -  31 mmol/L    Anion Gap, Calculation 5 5 - 18 mmol/L    Glucose 97 70 - 125 mg/dL    Calcium 8.9 8.5 - 10.5 mg/dL    BUN 29 (H) 8 - 22 mg/dL    Creatinine 0.65 (L) 0.70 - 1.30 mg/dL    GFR MDRD Af Amer >60 >60 mL/min/1.73m2    GFR MDRD Non Af Amer >60 >60 mL/min/1.73m2   BNP(B-type Natriuretic Peptide)   Result Value Ref Range    BNP 35 0 - 62 pg/mL   HM2(CBC w/o Differential)   Result Value Ref Range    WBC 8.6 4.0 - 11.0 thou/uL    RBC 3.38 (L) 4.40 - 6.20 mill/uL    Hemoglobin 8.9 (L) 14.0 - 18.0 g/dL    Hematocrit 28.8 (L) 40.0 - 54.0 %    MCV 85 80 - 100 fL    MCH 26.3 (L) 27.0 - 34.0 pg    MCHC 30.9 (L) 32.0 - 36.0 g/dL    RDW 16.2 (H) 11.0 - 14.5 %    Platelets 206 140 - 440 thou/uL    MPV 10.6 8.5 - 12.5 fL     Results for orders placed or performed in visit on 10/10/19   Basic Metabolic Panel   Result Value Ref Range    Sodium 139 136 - 145 mmol/L    Potassium 4.5 3.5 - 5.0 mmol/L    Chloride 109 (H) 98 - 107 mmol/L    CO2 25 22 - 31 mmol/L    Anion Gap, Calculation 5 5 - 18 mmol/L    Glucose 97 70 - 125 mg/dL    Calcium 8.9 8.5 - 10.5 mg/dL    BUN 29 (H) 8 - 22 mg/dL    Creatinine 0.65 (L) 0.70 - 1.30 mg/dL    GFR MDRD Af Amer >60 >60 mL/min/1.73m2    GFR MDRD Non Af Amer >60 >60 mL/min/1.73m2         Lab Results   Component Value Date    WBC 8.6 10/10/2019    HGB 8.9 (L) 10/10/2019    HCT 28.8 (L) 10/10/2019    MCV 85 10/10/2019     10/10/2019       No results found for: [id number]  No results found for: HGBA1C  Lab Results   Component Value Date    INR 1.14 (H) 08/27/2019    INR 1.06 04/23/2018    INR 1.15 (H) 10/19/2014     No results found for: [id number]  Lab Results   Component Value Date    TSH 1.52 10/17/2014           ASSESSMENT/PLAN:    1. Lymphedema, Fluid overload, ?Diastolic CHF: Weights 170-218lbs over last 44 days. Does appear to have severe dependent edema today toes to hips, very tight and pitting 3-4+. Largest size of tubigrips no longer fitting. Difficult for him to elevate but  did encourage this. Will order therapy to eval for lymphedema wrapping. Denies shortness of breath today but does have few crackles on exam, not significant congestion though. No diuretics currently, reports has used intermittently in past. Due to severe weight gain and signs of fluid overload as well as patient and nursing urgent concerns will give IM lasix 40mg x 1 now then start lasix 40mg po x 2 days then reduce to 20mg daily for maintenance. Weights changed to daily for closer monitoring. Valentine in place for frequency and closer output monitoring. Encouraged low sodium diet. Bmp on 10/17. Started kcl 20meq daily. Reviewed chart and Echo last done in 2014 did show Grade 1 diastolic dysfunction but EF was 65% at that time. Normal LV function. Small pericardial effusion at the time, no valve abnormalities, no pulmonary hypertension noted. May consider repeat Echo in near future to evaluate cardiac function. Appetite is improved from baseline but not significant, ate half of breakfast tray today and per staff seems adequate but not excessive. No sweets, treats, pop, high calorie snacks noted in room today. Obvious appetite improvement does not account for this significant of weight gain. Appears very fluid overloaded on exam. No ace/arb currently.  2. Hematuria, urinary retention, BPH, recent UTI: Valentine in place today. Did see urology (after several missed appointments) on 10/11 and ordered to have void trial in 2 weeks. F/u with urology in 2 weeks. Continue current treatments and urology orders. Encourage compliance with urology orders for void trial. On flomax, appears urology started alfuzosin on 10/11. Unclear on notes for reasoning as is also alpha blocker. urology needs to clarify which medication prefer patient to be on.   3. CAD, h/o NSTEMI: no recent chest pain. Continue current regimen and follow up with cardiology as indicated.   4. Spinal stenosis, chronic pain: Pain controlled today. On zanaflex,  gabapentin, flexeril, tylenol, voltaren gel, naproxen. Unclear reasoning on multiple muscle relaxers for chronic management but can often contribute to urinary retention and UTIs. Defer to primary attending providers for management.   5. HTN: -150s today. Controlled. Consider adding ace/arb due to fluid overload in addition to daily diuretic.   6. Failure to thrive: per staff suspected eating only one meal a day at home. Vulnerable adult case.   7. Cognitive impairment, medical noncompliance: per staff and records frequent history of noncompliance with treatments, medications, follow up visits. Continue to re-educate. Monitor for safety concerns. Per staff working on NGUYEN admission at discharge.   8. Anxiety: Evaluated by psych and recommended to start medication for management and he has been refusing.   9. Insomnia: On melatonin but reports this doesn't work for him. Discussed other alternatives but cautious about trying anything else. May be willing to try trazodone as did discuss this today.   10. Allergic rhinitis: previously abusing afrin, now on flonase, phenylephrine prn. F/u with ENT on 10/22.     Electronically signed by: Alena Angulo NP    Total floor/unit time spent 45 with 35 time spent on counseling and coordination of care. Counseling was done regarding fluid overload, dependent edema, lymphedema evaluation, causes, treatment options. Counseling regarding allergic rhinitis management, insomnia management, anxiety management, hypertension management. Counseling regarding lab monitoring, ability to manage fluid overload in house and no need for transfer to ed. Coordinated care with nursing for management of fluid overload, lymphedema, urinary retention, urology follow ups, void trial, insomnia management.

## 2021-06-02 NOTE — PROGRESS NOTES
[facility]      NAME:  Rashad Caputo             :  1950    MRN: [id number]    CODE STATUS:  FULL CODE    FACILITY: [facility] [[id number]]         CHIEF COMPLAIN/REASON FOR VISIT:  Chief Complaint   Patient presents with     Review Of Multiple Medical Conditions       HISTORY OF PRESENT ILLNESS: Rashad Caputo is a 69 y.o. male being seen for review of multiple medical conditions. Patient presented to the hospital with acute urinary retention.  He had a Valentine catheter placed and currently discharged with an indwelling Valentine catheter.  He will follow with urology for a voiding trial. He was noted to have hematuria on , improved today. He developed a UTI cultures grew strep viridans.  He was given Levaquin 7 days post discharge.He has bowel issues of fecal incontinence, ongoing. Reports some pain to left knee, managed with analgesics.   He was to go to  for Folay removal today, but once again cancelled his apt. He was educated ( again) on risk benefits of prolonged catheter use. He reports to me that he promises to have it removed by next Tuesday.   He does continue with therapies and SN on TCU for management of chronic disease status as well as assist in ADLS and bladder care.    Allergies   Allergen Reactions     Lisinopril Shortness Of Breath and Dizziness     Fosinopril Sodium Other (See Comments) and Dizziness     Mouth numbness     Amoxicillin-Pot Clavulanate Hives and Rash     Cephalosporins Hives and Rash     Penicillins Rash   :     Current Outpatient Medications   Medication Sig     acetaminophen (TYLENOL) 500 MG tablet Take 1-2 tablets (500-1,000 mg total) by mouth every 4 (four) hours as needed.     cyclobenzaprine (FLEXERIL) 10 MG tablet Take 1 tablet (10 mg total) by mouth 2 (two) times a day as needed for muscle spasms.     fluticasone propionate (FLONASE) 50 mcg/actuation nasal spray Apply 1 spray into each nostril 3 (three) times a day as  needed. For overuse of nasal decongestant.     magnesium oxide (MAG-OX) 400 mg (241.3 mg magnesium) tablet Take 400 mg by mouth daily.     melatonin 3 mg Tab tablet Take 5 mg by mouth at bedtime as needed.     naproxen sodium (ALEVE) 220 MG tablet Take 220 mg by mouth at bedtime.     phenylephrine (NICOLE-SYNEPHRINE) 1 % Spry 1-2 sprays into each nostril every 4 (four) hours as needed.            polyethylene glycol (MIRALAX) 17 gram packet Take 1 packet (17 g total) by mouth daily.     sodium chloride (OCEAN) 0.65 % nasal spray 1 spray into each nostril as needed for congestion. Use with phenylephrine spray.         REVIEW OF SYSTEMS:    Currently, no fever, chills, or rigors. Does not have any visual or hearing problems. Denies any chest pain, headaches, palpitations, lightheadedness, dizziness, shortness of breath, or cough. Appetite is good. Denies any GERD symptoms. Denies any difficulty with swallowing, nausea, or vomiting.  Denies any abdominal pain, was constipated now with loose stools d/t prune juice. Denies any urinary symptoms other then retention,. No insomnia. No active bleeding other then ongoing hematuria. No rash.       PHYSICAL EXAMINATION:  Vitals:    10/04/19 1402   BP: 129/77   Pulse: 87   Temp: 97.6  F (36.4  C)   Weight: 183 lb 12.8 oz (83.4 kg)         GENERAL: Awake, Alert, oriented x3,unkempt in appearance,  not in any form of acute distress, answers questions appropriately, follows simple commands, conversant  HEENT: Head is normocephalic with normal hair distribution. No evidence of trauma. Ears: No acute purulent discharge. Eyes: Conjunctivae pink with no scleral jaundice. Nose: Normal mucosa and septum. NECK: Supple with no cervical or supraclavicular lymphadenopathy. Trachea is midline.   CHEST: No tenderness or deformity, no crepitus  LUNG: Clear to auscultation with good chest expansion. There are no crackles or wheezes, normal AP diameter.  BACK: No kyphosis of the thoracic spine.  Symmetric, no curvature, ROM normal, no CVA tenderness, no spinal tenderness   CVS: There is good S1  S2,  rhythm is regular.  ABDOMEN: Globular and soft, nontender to palpation, non distended, no masses, no organomegaly, good bowel sounds, no rebound or guarding, no peritoneal signs.   EXTREMITIES: ROM UE WNL, he is unable to bear wt on legs. In wc. Pedal pulses present, no edema, arthritic aged joint swelling, right knee very edematous. .Trace edema bilaterally  SKIN: Warm and dry, no erythema noted, no rashes or lesions.  NEUROLOGICAL: The patient is oriented to person, place and time. Strength and sensation are grossly intact. Face is symmetric.          LABS:    Lab Results   Component Value Date    WBC 7.3 09/08/2019    HGB 11.4 (L) 09/08/2019    HCT 35.2 (L) 09/08/2019    MCV 82 09/08/2019     09/08/2019       Results for orders placed or performed in visit on 10/01/19   Basic Metabolic Panel   Result Value Ref Range    Sodium 139 136 - 145 mmol/L    Potassium 4.4 3.5 - 5.0 mmol/L    Chloride 105 98 - 107 mmol/L    CO2 26 22 - 31 mmol/L    Anion Gap, Calculation 8 5 - 18 mmol/L    Glucose 87 70 - 125 mg/dL    Calcium 9.2 8.5 - 10.5 mg/dL    BUN 31 (H) 8 - 22 mg/dL    Creatinine 0.71 0.70 - 1.30 mg/dL    GFR MDRD Af Amer >60 >60 mL/min/1.73m2    GFR MDRD Non Af Amer >60 >60 mL/min/1.73m2           No results found for: HGBA1C  No results found for: OTBDHMCA15AC  No results found for: HUUPUOFH64    ASSESSMENT/PLAN:  1. Urinary retention    2. Debilitated patient      1.  Retention and. Urinary obstruction: Valentine in place, hematuria at random periods, ua was negative. He has been to  on 9/30/19 and we are going to try void trial at end of week, no refusing trial.   Encouraged fluid     2.Debilitated pt: He continues working with therapy, LT goal of AL upon dc.    Electronically signed by:  Anuradha Barrett CNP  This progress note was completed using Dragon software and there may be grammatical errors.

## 2021-06-02 NOTE — PROGRESS NOTES
[facility]      NAME:  Rashad Caputo             :  1950    MRN: [id number]    CODE STATUS:  FULL CODE    FACILITY: [facility] [[id number]]         CHIEF COMPLAIN/REASON FOR VISIT:  Chief Complaint   Patient presents with     Review Of Multiple Medical Conditions       HISTORY OF PRESENT ILLNESS: Rashad Caputo is a 69 y.o. male being seen for review of multiple medical conditions. Patient presented to the hospital with acute urinary retention.  He had a Valentine catheter placed and currently discharged with an indwelling Valentine catheter.  He will follow with urology for a voiding trial. He was noted to have hematuria on , improved today. He developed a UTI cultures grew strep viridans.  He was given Levaquin 7 days post discharge.He has bowel issues of fecal incontinence, ongoing. Reports some pain to left knee, managed with analgesics.   He was to go to  for Folay removal today, but once again cancelled his apt. He was educated ( again) on risk benefits of prolonged catheter use. He reports to me that he promises to have it removed by next Tuesday.   He does continue with therapies and SN on TCU for management of chronic disease status as well as assist in ADLS and bladder care.He has a f/u apt ( again) on 10/11/19, family plans oncoming so pt will not cancel.    Allergies   Allergen Reactions     Lisinopril Shortness Of Breath and Dizziness     Fosinopril Sodium Other (See Comments) and Dizziness     Mouth numbness     Amoxicillin-Pot Clavulanate Hives and Rash     Cephalosporins Hives and Rash     Penicillins Rash   :     Current Outpatient Medications   Medication Sig     tamsulosin (FLOMAX) 0.4 mg cap Take 0.4 mg by mouth.     acetaminophen (TYLENOL) 500 MG tablet Take 1-2 tablets (500-1,000 mg total) by mouth every 4 (four) hours as needed.     cyclobenzaprine (FLEXERIL) 10 MG tablet Take 1 tablet (10 mg total) by mouth 2 (two) times a day as needed for  muscle spasms.     fluticasone propionate (FLONASE) 50 mcg/actuation nasal spray Apply 1 spray into each nostril 3 (three) times a day as needed. For overuse of nasal decongestant.     magnesium oxide (MAG-OX) 400 mg (241.3 mg magnesium) tablet Take 400 mg by mouth daily.     melatonin 3 mg Tab tablet Take 5 mg by mouth at bedtime as needed.     naproxen sodium (ALEVE) 220 MG tablet Take 220 mg by mouth at bedtime.     phenylephrine (NICOLE-SYNEPHRINE) 1 % Spry 1-2 sprays into each nostril every 4 (four) hours as needed.            polyethylene glycol (MIRALAX) 17 gram packet Take 1 packet (17 g total) by mouth daily.     sodium chloride (OCEAN) 0.65 % nasal spray 1 spray into each nostril as needed for congestion. Use with phenylephrine spray.         REVIEW OF SYSTEMS:    Currently, no fever, chills, or rigors. Does not have any visual or hearing problems. Denies any chest pain, headaches, palpitations, lightheadedness, dizziness, shortness of breath, or cough. Appetite is good. Denies any GERD symptoms. Denies any difficulty with swallowing, nausea, or vomiting.  Denies any abdominal pain, was constipated now with loose stools d/t prune juice. Denies any urinary symptoms other then retention,. No insomnia. No active bleeding other then ongoing hematuria. No rash.       PHYSICAL EXAMINATION:  Vitals:    10/10/19 1715   BP: 143/86   Pulse: 86   Temp: 97.4  F (36.3  C)   Weight: 206 lb (93.4 kg)         GENERAL: Awake, Alert, oriented x3,unkempt in appearance,  not in any form of acute distress, answers questions appropriately, follows simple commands, conversant  HEENT: Head is normocephalic with normal hair distribution. No evidence of trauma. Ears: No acute purulent discharge. Eyes: Conjunctivae pink with no scleral jaundice. Nose: Normal mucosa and septum. NECK: Supple with no cervical or supraclavicular lymphadenopathy. Trachea is midline.   CHEST: No tenderness or deformity, no crepitus  LUNG: Clear to  auscultation with good chest expansion. There are no crackles or wheezes, normal AP diameter.  BACK: No kyphosis of the thoracic spine. Symmetric, no curvature, ROM normal, no CVA tenderness, no spinal tenderness   CVS: There is good S1  S2,  rhythm is regular.  ABDOMEN: Globular and soft, nontender to palpation, non distended, no masses, no organomegaly, good bowel sounds, no rebound or guarding, no peritoneal signs.   EXTREMITIES: ROM UE WNL, he is unable to bear wt on legs. In wc. Pedal pulses present, no edema, arthritic aged joint swelling, right knee very edematous. .Trace edema bilaterally  SKIN: Warm and dry, no erythema noted, no rashes or lesions.  NEUROLOGICAL: The patient is oriented to person, place and time. Strength and sensation are grossly intact. Face is symmetric.          LABS:    Lab Results   Component Value Date    WBC 8.6 10/10/2019    HGB 8.9 (L) 10/10/2019    HCT 28.8 (L) 10/10/2019    MCV 85 10/10/2019     10/10/2019       Results for orders placed or performed in visit on 10/10/19   Basic Metabolic Panel   Result Value Ref Range    Sodium 139 136 - 145 mmol/L    Potassium 4.5 3.5 - 5.0 mmol/L    Chloride 109 (H) 98 - 107 mmol/L    CO2 25 22 - 31 mmol/L    Anion Gap, Calculation 5 5 - 18 mmol/L    Glucose 97 70 - 125 mg/dL    Calcium 8.9 8.5 - 10.5 mg/dL    BUN 29 (H) 8 - 22 mg/dL    Creatinine 0.65 (L) 0.70 - 1.30 mg/dL    GFR MDRD Af Amer >60 >60 mL/min/1.73m2    GFR MDRD Non Af Amer >60 >60 mL/min/1.73m2           No results found for: HGBA1C  No results found for: [id number]  No results found for: [id number]    ASSESSMENT/PLAN:  1. Spinal stenosis, unspecified spinal region    2. Urinary retention    3. Anxiety      1. Spinal Stenosis: Pain controlled on current med regime. He is attending therapy on the TCU. DC planning is ongoing as he will require an AL setting unable to manage at home undependably.    2.  Retention and. Urinary obstruction: Valentine in place, no hematuria  He  has been cancelling his  APTS. Now has apt on 10/11 and he has family that will accompany him to apt. Urine clear straw color.    3 Anxiety: Reviewed ot anxiety and as per Dr Cisneros suggestions would like pt to start anti anxiety. Pt has refused medicinal intervention. SW plans on fu with Rashad to review.    Electronically signed by:  Anuradha Barrett CNP  This progress note was completed using Dragon software and there may be grammatical errors.

## 2021-06-02 NOTE — PROGRESS NOTES
[facility]      NAME:  Rashad Caputo             :  1950    MRN: [id number]    CODE STATUS:  FULL CODE    FACILITY: [facility] [[id number]]         CHIEF COMPLAIN/REASON FOR VISIT:  Chief Complaint   Patient presents with     Review Of Multiple Medical Conditions       HISTORY OF PRESENT ILLNESS: Rashad Caputo is a 69 y.o. male being seen for review of multiple medical conditions. Seen today, up in  after planned therapy.  Patient presented to the hospital with acute urinary retention.  He had a Valentine catheter placed and currently discharged with an indwelling Valentine catheter.  He will follow with urology for a voiding trial. He was noted to have hematuria on , improved today. He developed a UTI cultures grew strep viridans.  He was given Levaquin 7 days post discharge.He has bowel issues of fecal incontinence, ongoing. Reports some pain to left knee, managed with analgesics.   He was to go to  for Folay removal today, but once again cancelled his apt. He was educated ( again) on risk benefits of prolonged catheter use. He reports to me that he promises to have it removed by next Tuesday.   He does continue with therapies and SN on TCU for management of chronic disease status as well as assist in ADLS and bladder care. Sw has been searching for dc planning as pt will require AL. Rashad reports overall feeling of well being with pain managed. OT is now following for potential lymph wraps , however susan vázquez have helped with LL edema    Allergies   Allergen Reactions     Lisinopril Shortness Of Breath and Dizziness     Fosinopril Sodium Other (See Comments) and Dizziness     Mouth numbness     Amoxicillin-Pot Clavulanate Hives and Rash     Cephalosporins Hives and Rash     Penicillins Rash   :     Current Outpatient Medications   Medication Sig     acetaminophen (TYLENOL) 500 MG tablet Take 1-2 tablets (500-1,000 mg total) by mouth every 4 (four)  hours as needed.     cyclobenzaprine (FLEXERIL) 10 MG tablet Take 1 tablet (10 mg total) by mouth 2 (two) times a day as needed for muscle spasms.     fluticasone propionate (FLONASE) 50 mcg/actuation nasal spray Apply 1 spray into each nostril 3 (three) times a day as needed. For overuse of nasal decongestant.     furosemide (LASIX) 20 MG tablet Take 20 mg by mouth daily.     magnesium oxide (MAG-OX) 400 mg (241.3 mg magnesium) tablet Take 400 mg by mouth daily.     melatonin 3 mg Tab tablet Take 5 mg by mouth at bedtime as needed.     naproxen sodium (ALEVE) 220 MG tablet Take 220 mg by mouth at bedtime.     phenylephrine (NICOLE-SYNEPHRINE) 1 % Spry 1-2 sprays into each nostril every 4 (four) hours as needed.            polyethylene glycol (MIRALAX) 17 gram packet Take 1 packet (17 g total) by mouth daily.     sodium chloride (OCEAN) 0.65 % nasal spray 1 spray into each nostril as needed for congestion. Use with phenylephrine spray.     tamsulosin (FLOMAX) 0.4 mg cap Take 0.4 mg by mouth.         REVIEW OF SYSTEMS:    Currently, no fever, chills, or rigors. Does not have any visual or hearing problems. Denies any chest pain, headaches, palpitations, lightheadedness, dizziness, shortness of breath, or cough. Appetite is good. Denies any GERD symptoms. Denies any difficulty with swallowing, nausea, or vomiting.  Denies any abdominal pain, was constipated now with loose stools d/t prune juice. Denies any urinary symptoms other then retention,. No insomnia. No active bleeding other then ongoing hematuria. No rash.       PHYSICAL EXAMINATION:  Vitals:    10/21/19 1549   BP: 129/70   Pulse: 94   Temp: 96.8  F (36  C)   Weight: 218 lb 9.6 oz (99.2 kg)         GENERAL: Awake, Alert, oriented x3,unkempt in appearance,  not in any form of acute distress, answers questions appropriately, follows simple commands, conversant  HEENT: Head is normocephalic with normal hair distribution. No evidence of trauma. Ears: No acute  purulent discharge. Eyes: Conjunctivae pink with no scleral jaundice. Nose: Normal mucosa and septum. NECK: Supple with no cervical or supraclavicular lymphadenopathy. Trachea is midline.   CHEST: No tenderness or deformity, no crepitus  LUNG: Clear to auscultation with good chest expansion. There are no crackles or wheezes, normal AP diameter.  BACK: No kyphosis of the thoracic spine. Symmetric, no curvature, ROM normal, no CVA tenderness, no spinal tenderness   CVS: There is good S1  S2,  rhythm is regular.  ABDOMEN: Globular and soft, nontender to palpation, non distended, no masses, no organomegaly, good bowel sounds, no rebound or guarding, no peritoneal signs.   EXTREMITIES: ROM UE WNL, he is unable to bear wt on legs. In wc. Pedal pulses present, pedal  edema, arthritic aged joint swelling, right knee very edematous. .Trace edema bilaterally  SKIN: Warm and dry, no erythema noted, no rashes or lesions.  NEUROLOGICAL: The patient is oriented to person, place and time. Strength and sensation are grossly intact. Face is symmetric.          LABS:    Lab Results   Component Value Date    WBC 8.6 10/10/2019    HGB 8.9 (L) 10/10/2019    HCT 28.8 (L) 10/10/2019    MCV 85 10/10/2019     10/10/2019       Results for orders placed or performed in visit on 10/17/19   Basic Metabolic Panel   Result Value Ref Range    Sodium 140 136 - 145 mmol/L    Potassium 4.6 3.5 - 5.0 mmol/L    Chloride 106 98 - 107 mmol/L    CO2 29 22 - 31 mmol/L    Anion Gap, Calculation 5 5 - 18 mmol/L    Glucose 97 70 - 125 mg/dL    Calcium 8.9 8.5 - 10.5 mg/dL    BUN 34 (H) 8 - 22 mg/dL    Creatinine 0.69 (L) 0.70 - 1.30 mg/dL    GFR MDRD Af Amer >60 >60 mL/min/1.73m2    GFR MDRD Non Af Amer >60 >60 mL/min/1.73m2           No results found for: HGBA1C  No results found for: [id number]  No results found for: ARWHFEMX25    ASSESSMENT/PLAN:  1. Failure to thrive in adult    2. Cognitive impairment    3. Lymphedema      1.Pt was a FTH prior  to admission, now receiving ADL care as well as proper nutrient, gaining wt and thriving. DC planning ongoing and he needs a more structured living environment     2. Cognitive impairments: He is functioning well in a controlled enviroment, still impulsive at times, fall last week as not asking for help. Will require more structure on dc from TCU.     3. Lymphedema: He is now on lasix, was non compliant with Tubis, noted to be wearing this am. We did have OT look at for lymph wraps as well.    Electronically signed by:  Anuradha Barrett CNP  This progress note was completed using Dragon software and there may be grammatical errors.

## 2021-06-02 NOTE — PROGRESS NOTES
[facility]      NAME:  Rashad Caputo             :  1950    MRN: [id number]    CODE STATUS:  FULL CODE    FACILITY: [facility] [[id number]]         CHIEF COMPLAIN/REASON FOR VISIT:  Chief Complaint   Patient presents with     Review Of Multiple Medical Conditions       HISTORY OF PRESENT ILLNESS: Rashad Caputo is a 69 y.o. male being seen for review of multiple medical conditions. Seen today, up in  after planned therapy.  Patient presented to the hospital with acute urinary retention.  He had a Shen catheter placed and currently discharged with an indwelling Shen catheter.  He will follow with urology for a voiding trial. He was noted to have hematuria on , improved today. He developed a UTI cultures grew strep viridans.  He was given Levaquin 7 days post discharge.He has bowel issues of fecal incontinence, ongoing. Reports some pain to left knee, managed with analgesics.   He was to go to  for Folay removal today, but once again cancelled his apt. He was educated ( again) on risk benefits of prolonged catheter use. He reports to me that he promises to have it removed by next Tuesday.   He does continue with therapies and SN on TCU for management of chronic disease status as well as assist in ADLS and bladder care. Sw has been searching for dc planning as pt will require AL. Rashad reports overall feeling of well being with pain managed. OT is now following for  lymph wraps. Pt was to have shen our per  orders. He does continue to refuse.    Allergies   Allergen Reactions     Lisinopril Shortness Of Breath and Dizziness     Fosinopril Sodium Other (See Comments) and Dizziness     Mouth numbness     Amoxicillin-Pot Clavulanate Hives and Rash     Cephalosporins Hives and Rash     Penicillins Rash   :     Current Outpatient Medications   Medication Sig     acetaminophen (TYLENOL) 500 MG tablet Take 1-2 tablets (500-1,000 mg total) by mouth every 4  (four) hours as needed.     cyclobenzaprine (FLEXERIL) 10 MG tablet Take 1 tablet (10 mg total) by mouth 2 (two) times a day as needed for muscle spasms.     fluticasone propionate (FLONASE) 50 mcg/actuation nasal spray Apply 1 spray into each nostril 3 (three) times a day as needed. For overuse of nasal decongestant.     furosemide (LASIX) 20 MG tablet Take 20 mg by mouth daily.     magnesium oxide (MAG-OX) 400 mg (241.3 mg magnesium) tablet Take 400 mg by mouth daily.     naproxen sodium (ALEVE) 220 MG tablet Take 220 mg by mouth at bedtime.     phenylephrine (NICOLE-SYNEPHRINE) 1 % Spry 1-2 sprays into each nostril every 4 (four) hours as needed.            polyethylene glycol (MIRALAX) 17 gram packet Take 1 packet (17 g total) by mouth daily.     sodium chloride (OCEAN) 0.65 % nasal spray 1 spray into each nostril as needed for congestion. Use with phenylephrine spray.     tamsulosin (FLOMAX) 0.4 mg cap Take 0.4 mg by mouth.         REVIEW OF SYSTEMS:    Currently, no fever, chills, or rigors. Does not have any visual or hearing problems. Denies any chest pain, headaches, palpitations, lightheadedness, dizziness, shortness of breath, or cough. Appetite is good. Denies any GERD symptoms. Denies any difficulty with swallowing, nausea, or vomiting.  Denies any abdominal pain, was constipated now with loose stools d/t prune juice. Denies any urinary symptoms other then retention,. No insomnia. No active bleeding other then ongoing hematuria. No rash.       PHYSICAL EXAMINATION:  Vitals:    10/29/19 1808   BP: 163/83   Pulse: 97   Temp: 96.6  F (35.9  C)   Weight: (!) 222 lb 12.8 oz (101.1 kg)         GENERAL: Awake, Alert, oriented x3,unkempt in appearance,  not in any form of acute distress, answers questions appropriately, follows simple commands, conversant  HEENT: Head is normocephalic with normal hair distribution. No evidence of trauma. Ears: No acute purulent discharge. Eyes: Conjunctivae pink with no scleral  jaundice. Nose: Normal mucosa and septum. NECK: Supple with no cervical or supraclavicular lymphadenopathy. Trachea is midline.   CHEST: No tenderness or deformity, no crepitus  LUNG: Clear to auscultation with good chest expansion. There are no crackles or wheezes, normal AP diameter.  BACK: No kyphosis of the thoracic spine. Symmetric, no curvature, ROM normal, no CVA tenderness, no spinal tenderness   CVS: There is good S1  S2,  rhythm is regular.  ABDOMEN: Globular and soft, nontender to palpation, non distended, no masses, no organomegaly, good bowel sounds, no rebound or guarding, no peritoneal signs.   EXTREMITIES: ROM UE WNL, he is unable to bear wt on legs. In wc. Pedal pulses present, pedal  edema, arthritic aged joint swelling, right knee very edematous. .Trace edema bilaterally  SKIN: Warm and dry, no erythema noted, no rashes or lesions.  NEUROLOGICAL: The patient is oriented to person, place and time. Strength and sensation are grossly intact. Face is symmetric.          LABS:    Lab Results   Component Value Date    WBC 8.6 10/10/2019    HGB 8.9 (L) 10/10/2019    HCT 28.8 (L) 10/10/2019    MCV 85 10/10/2019     10/10/2019       Results for orders placed or performed in visit on 10/17/19   Basic Metabolic Panel   Result Value Ref Range    Sodium 140 136 - 145 mmol/L    Potassium 4.6 3.5 - 5.0 mmol/L    Chloride 106 98 - 107 mmol/L    CO2 29 22 - 31 mmol/L    Anion Gap, Calculation 5 5 - 18 mmol/L    Glucose 97 70 - 125 mg/dL    Calcium 8.9 8.5 - 10.5 mg/dL    BUN 34 (H) 8 - 22 mg/dL    Creatinine 0.69 (L) 0.70 - 1.30 mg/dL    GFR MDRD Af Amer >60 >60 mL/min/1.73m2    GFR MDRD Non Af Amer >60 >60 mL/min/1.73m2           No results found for: HGBA1C  No results found for: GYRGTIOV63MG  No results found for: GCEGJQIX58    ASSESSMENT/PLAN:  1. Urinary retention    2. Debilitated patient    3. Insomnia, unspecified type        1. Urinary retention: Was to have shen removed, pt refused. He reports he  did set up an appointment with  for removal on Friday, fu to see if he carries through.    2.Pt was a FTH prior to admission,DEBILITATED  now receiving ADL care as well as proper nutrient, gaining wt and thriving. DC planning ongoing and he needs a more structured living environment, Finances is a major road block as well as denials to several referrals for accepting pt.    3. Insomnia: suggestion of Seroquel from Dr Cisneros, however pt declined  Offer. Education on med reviewed. He declines offer of medication for sleep, antianxiety or mood stabilizer. Followed by psych services with Dr Cisneros.    Electronically signed by:  Anuradha Barrett CNP  This progress note was completed using Dragon software and there may be grammatical errors.

## 2021-06-02 NOTE — PROGRESS NOTES
[facility] Admission note      Patient: Rashad Caputo  MRN: [id number]  Date of Service: 10/24/2019      [facility] [[id number]]  Reason for Visit     Chief Complaint   Patient presents with     Review Of Multiple Medical Conditions       Code Status     Full code    Assessment     -Acute urinary retention status post Valentine catheter placement  -Acute back pain with underlying history of severe spinal stenoses/kyphoscoliosis with postural instability noted on exam  -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  -Vulnerable adult  -Failure to thrive with difficulty functioning in his home environment.  Multiple ER visits noted with 6 emergency room visits noted     Plan     Patient has been admitted to the TCU.  He was examined at his request.  He currently has an indwelling Valentine catheter.  He attributes his retention issues to underlying history of spinal stenoses.  He was seen by urology and a Valentine catheter has been replaced.  He will have another repeat voiding trial in 4 to 6 weeks unfortunately repeating voiding trial in the TCU was not successful.  I had a cornel discussion with him regarding his progressive weight gain.  He was admitted with a weight of 180 pounds and currently stopping at 220 pounds.  I do not think this is fluid overload this appears to be more of her progressive weight gain which he agrees due to excessive caloric intake.  He told me he has been through a very stressful time recently.  He was at home he was not taking care of himself and his wife had dementia.  He was unable to  provide himself enough food and now that he is in a care center he is eating excessively.  He was counseled that this may not be a good option for him.  We also talked about his insomnia concern and mood and behaviors he has been seeing psych he seems to have some adjustment reaction and has had several behavioral outburst in the TCU.  He unfortunately does not want to try an antidepressant he feels his mood is stable he would let me know however if he needs to he does admit that he is having significant psychosocial stressors as he and his wife both are looking at alternative placement and understand that he cannot go back to his own home.  Continue to monitor his mood and behaviors   total time spent is 35 minutes greater than 25 minutes spent face-to-face talking to this patient regarding his weight gain with excessive caloric intake and mood and behavioral issues including behavioral outbursts that he has had in his TCU.  Patient will let me know if he is agreeable to starting any medication to help him with coping with his situational stressors      History     Patient is a very pleasant 69 y.o. male who is admitted to TCU  Patient presented to the hospital with acute urinary retention.  He had a Valentine catheter placed and currently discharged with an indwelling Valentine catheter.  He will follow with urology for a voiding trial.  Apparently at the request he was given a liter voiding trial in the TCU and failed and has an indwelling Valentine catheter they are recommending that he should have a repeat voiding trial in 4 to 6 weeks time.  Patient has had progressive weight gain secondary to high caloric intake.  He reports that he is eating excessively.  Apparently when he was at home he was unable to provide himself enough caloric intake as well as his wife had dementia and was not able to help him either.  He reports that he has been eating excessively which has resulted in a significant weight gain he had developed  some lymphedema and was given some wraps.  His right knee continues to be quite significantly osteoarthritic.  He has developed contractures with deformity in his right leg.  Pain management reviewed and he says his pain is stable   he continues to have some mood disorder.  Psychology had recommended SSRI for him.  However he is refusing to agree to anything he told me he is going through pretty stressful time.  His wife is awaiting placement.  He himself is looking at alternative placement however he thinks he would like to wait    Past Medical History     Active Ambulatory (Non-Hospital) Problems    Diagnosis     Fluid overload     Lymphedema     Allergic rhinitis     H/O noncompliance with medical treatment, presenting hazards to health     Failure to thrive in adult     Cognitive impairment     Weight gain     Stool incontinence     Insomnia     Discharge planning issues     Debilitated patient     Urinary retention     Urinary obstruction     Urinary tract infection     Benign prostatic hyperplasia with lower urinary tract symptoms     Post-void dribbling     Rhinitis medicamentosa     Sepsis due to urinary tract infection (H)     Sepsis (H)     CAD (coronary artery disease)     Non-STEMI (non-ST elevated myocardial infarction) (H)     Tachycardia     Anxiety     Spinal stenosis     Hypertension     Benign Prostatic Hypertrophy     Myalgia And Myositis     Urinary Tract Infection     Feelings Of Urinary Urgency     Joint Pain, Localized In The Hip     Past Medical History:   Diagnosis Date     Arthritis      Benign prostatic hyperplasia with lower urinary tract symptoms 2/23/2017     BPH (benign prostatic hypertrophy)      Coronary artery disease      History of transfusion 1975     Hypertension      Pneumonia 1980     Post-void dribbling 2/23/2017     Retinal tear of right eye      Rhinitis medicamentosa 2/23/2017     Spinal stenosis      UTI (urinary tract infection)        Past Social History     Reviewed,  and he  reports that he quit smoking about 34 years ago. He has never used smokeless tobacco. He reports that he does not drink alcohol or use drugs.    Family History     Reviewed, and includes cataracts and CVD in his father; his mother had dementia; grandmother had DM    Medication List     Current Outpatient Medications on File Prior to Visit   Medication Sig Dispense Refill     acetaminophen (TYLENOL) 500 MG tablet Take 1-2 tablets (500-1,000 mg total) by mouth every 4 (four) hours as needed.  0     cyclobenzaprine (FLEXERIL) 10 MG tablet Take 1 tablet (10 mg total) by mouth 2 (two) times a day as needed for muscle spasms. 10 tablet 0     fluticasone propionate (FLONASE) 50 mcg/actuation nasal spray Apply 1 spray into each nostril 3 (three) times a day as needed. For overuse of nasal decongestant.       furosemide (LASIX) 20 MG tablet Take 20 mg by mouth daily.       magnesium oxide (MAG-OX) 400 mg (241.3 mg magnesium) tablet Take 400 mg by mouth daily.       melatonin 3 mg Tab tablet Take 5 mg by mouth at bedtime as needed.       naproxen sodium (ALEVE) 220 MG tablet Take 220 mg by mouth at bedtime.       phenylephrine (NICOLE-SYNEPHRINE) 1 % Spry 1-2 sprays into each nostril every 4 (four) hours as needed.              polyethylene glycol (MIRALAX) 17 gram packet Take 1 packet (17 g total) by mouth daily.  0     sodium chloride (OCEAN) 0.65 % nasal spray 1 spray into each nostril as needed for congestion. Use with phenylephrine spray.       tamsulosin (FLOMAX) 0.4 mg cap Take 0.4 mg by mouth.       No current facility-administered medications on file prior to visit.        Allergies     Allergies   Allergen Reactions     Lisinopril Shortness Of Breath and Dizziness     Fosinopril Sodium Other (See Comments) and Dizziness     Mouth numbness     Amoxicillin-Pot Clavulanate Hives and Rash     Cephalosporins Hives and Rash     Penicillins Rash       Review of Systems   A comprehensive review of 14 systems was done.  Pertinent findings noted here and in history of present illness. All the rest negative.  Constitutional: Negative.  Negative for fever, chills, activity change, appetite change and fatigue  He is having significant weight gain of almost 40 pounds since admission.   HENT: Negative for congestion and facial swelling.    Eyes: Negative for photophobia, redness and visual disturbance.   Respiratory: Negative for cough and chest tightness.    Cardiovascular: Negative for chest pain, palpitations and leg swelling.   Gastrointestinal: Negative for nausea, diarrhea, constipation, blood in stool and abdominal distention.   Genitourinary: Negative.    Musculoskeletal: Negative.    Skin: Negative.  Hyperpigmented rescaling noted in both of his hands.  Patient told me it is from chronic handwashing  Neurological: Negative for dizziness, tremors, syncope, weakness, light-headedness and headaches.   Hematological: Does not bruise/bleed easily.   Psychiatric/Behavioral: Negative.  Patient is reporting insomnia.  Mood and behaviors have shown some dysregulation with outbursts reported by staff      Physical Exam     Recent Vitals 10/21/2019   Weight 218 lbs 10 oz   BSA (m2) 2.2 m2   /70   Pulse 94   Temp 96.8   SpO2 -   Some recent data might be hidden   Recheck weight now is 221 pounds    Constitutional: Oriented to person, place, and time and appears well-developed.   Looks very disabled and deconditioned  HEENT:  Normocephalic and atraumatic.  Eyes: Conjunctivae and EOM are normal. Pupils are equal, round, and reactive to light. No discharge.  No scleral icterus. Nose normal. Mouth/Throat: Oropharynx is clear and moist. No oropharyngeal exudate.    NECK: Normal range of motion. Neck supple. No JVD present. No tracheal deviation present. No thyromegaly present.   CARDIOVASCULAR: Normal rate, regular rhythm and intact distal pulses.  Exam reveals no gallop and no friction rub.  Systolic murmur present.  PULMONARY: Effort  normal and breath sounds normal. No respiratory distress.No Wheezing or rales.  ABDOMEN: Soft. Bowel sounds are normal. No distension and no mass.  There is no tenderness. There is no rebound and no guarding. No HSM.  Open area noted on his right buttock which appears to be chronic with hyperpigmented changes noted with irritation on his buttocks  MUSCULOSKELETAL: Normal range of motion. 1+ edema and no tenderness. Mild kyphosis, no tenderness.  Right lower extremity is swollen with profound arthritic changes noted with swelling in his right knee.  On standing leg is crooked and patient is not able to stand up straight.  Profound kyphoscoliosis of his back also noted and he could not straighten himself up he was in severe pain  LYMPH NODES: Has no cervical, supraclavicular, axillary and groin adenopathy.   NEUROLOGICAL: Alert and oriented to person, place, and time. No cranial nerve deficit.  Normal muscle tone. Coordination normal.   GENITOURINARY: Deferred exam.  Valentine present  SKIN: Skin is warm and dry. No rash noted. No erythema. No pallor.   Hypopigmentary changes noted in his both fingers and hands which patient attributes to chronic handwashing  EXTREMITIES: No cyanosis, no clubbing,1+ edema. No Deformity.  PSYCHIATRIC: Normal mood, affect and behavior.  Has chronic anxiety      Lab Results       Lab Results   Component Value Date    ALBUMIN 4.0 02/15/2019    ALT 19 02/15/2019    AST 19 02/15/2019    BUN 34 (H) 10/17/2019    CALCIUM 8.9 10/17/2019     10/17/2019    CHOL 132 10/19/2014    CO2 29 10/17/2019    CREATININE 0.69 (L) 10/17/2019    GFRAA >60 10/17/2019    GFRNONAA >60 10/17/2019    GLU 97 10/17/2019    HCT 28.8 (L) 10/10/2019    WBC 8.6 10/10/2019    HGB 8.9 (L) 10/10/2019    MG 1.8 09/08/2019     10/10/2019    K 4.6 10/17/2019    PSA 4.5 02/15/2013     10/17/2019         Post Discharge Medication Reconciliation Status: discharge medications reconciled and changed, per  note/orders (see AVS)      NICKOLAS Alvarado

## 2021-06-02 NOTE — PROGRESS NOTES
"  [facility]      NAME:  Rashad Caputo             :  1950    MRN: [id number]    CODE STATUS:  FULL CODE    FACILITY: [facility] [[id number]]         CHIEF COMPLAIN/REASON FOR VISIT:  Chief Complaint   Patient presents with     Problem Visit     sleep disturbance and edema        HISTORY OF PRESENT ILLNESS: Rashad Caputo is a 69 y.o. male being seen for review of multiple medical conditions. Patient presented to the hospital with acute urinary retention.  He had a Shen catheter placed and currently discharged with an indwelling Shen catheter.  He will follow with urology for a voiding trial. He was noted to have hematuria on 19, improved today. He developed a UTI cultures grew strep viridans.  He was given Levaquin 7 days post discharge.    Today: Seen per nursing request for insomnia and c/o trouble staying asleep.He apparently saw another provider earlier this week and was offered a trial of trazodone but refused and today he is willing to try this. Additionally, he was started on extensive diuresis for his extreme lypmphadema and weight gain of >40lbs since admission. Today his LE are +4 from groin to feet. No redness, warmth, or s/sx of infection. He continues to use shen catheter. He is denying major pain.   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 He has tried \"everything\" and so he currently is attempting to wean the affrin and only use it once per day. He says he becomes congested at night and this makes breathing uncomfortable and difficult which keeps him awake. He is willing to try trazodone which my help him sleep through the discomfort. He is refusing to try anything new for the rebound nasal congestion and it sounds like he has been working with ENT.     Allergies   Allergen Reactions     Lisinopril Shortness Of Breath and Dizziness     Fosinopril Sodium Other (See Comments) and " Dizziness     Mouth numbness     Amoxicillin-Pot Clavulanate Hives and Rash     Cephalosporins Hives and Rash     Penicillins Rash   :     Current Outpatient Medications   Medication Sig     acetaminophen (TYLENOL) 500 MG tablet Take 1-2 tablets (500-1,000 mg total) by mouth every 4 (four) hours as needed.     cyclobenzaprine (FLEXERIL) 10 MG tablet Take 1 tablet (10 mg total) by mouth 2 (two) times a day as needed for muscle spasms.     fluticasone propionate (FLONASE) 50 mcg/actuation nasal spray Apply 1 spray into each nostril 3 (three) times a day as needed. For overuse of nasal decongestant.     furosemide (LASIX) 20 MG tablet Take 20 mg by mouth daily.     magnesium oxide (MAG-OX) 400 mg (241.3 mg magnesium) tablet Take 400 mg by mouth daily.     melatonin 3 mg Tab tablet Take 5 mg by mouth at bedtime as needed.     naproxen sodium (ALEVE) 220 MG tablet Take 220 mg by mouth at bedtime.     phenylephrine (NICOLE-SYNEPHRINE) 1 % Spry 1-2 sprays into each nostril every 4 (four) hours as needed.            polyethylene glycol (MIRALAX) 17 gram packet Take 1 packet (17 g total) by mouth daily.     sodium chloride (OCEAN) 0.65 % nasal spray 1 spray into each nostril as needed for congestion. Use with phenylephrine spray.     tamsulosin (FLOMAX) 0.4 mg cap Take 0.4 mg by mouth.         REVIEW OF SYSTEMS:    Currently, no fever, chills, or rigors. Does not have any visual or hearing problems. Denies any chest pain, headaches, palpitations, lightheadedness, dizziness, shortness of breath, or cough. Appetite is good. Endorses nasal congestion s/s to medication use, lymphadema and weight gain, insomnia.       PHYSICAL EXAMINATION:  Vitals:    10/18/19 1216   BP: 107/68   Pulse: 97   Temp: 97  F (36.1  C)   SpO2: 98%   Weight: 220 lb (99.8 kg)         GENERAL: Awake, Alert, oriented x3,unkempt in appearance,  not in any form of acute distress, answers questions appropriately, follows simple commands, conversant  HEENT: Head  is normocephalic; eyes: EOM, sclera anicteric.   CHEST: No tenderness or deformity, no crepitus  LUNG: Clear to auscultation with good chest expansion. There are no crackles or wheezes, normal AP diameter.  BACK: No kyphosis of the thoracic spine. Symmetric, no curvature, ROM normal, no CVA tenderness, no spinal tenderness   CVS: There is good S1  S2,  rhythm is regular.  ABDOMEN: Globular and soft, nontender to palpation, non distended, no masses, no organomegaly, good bowel sounds, no rebound or guarding, no peritoneal signs.   EXTREMITIES: ROM UE WNL, Significant lower bilateral LE lymphadema +3-4 extending from feet to pelvis.   SKIN: Warm and dry, no erythema noted, no rashes or lesions.  NEUROLOGICAL: The patient is oriented to person, place and time. Strength and sensation are grossly intact. Face is symmetric.    LABS:    Lab Results   Component Value Date    WBC 8.6 10/10/2019    HGB 8.9 (L) 10/10/2019    HCT 28.8 (L) 10/10/2019    MCV 85 10/10/2019     10/10/2019       Results for orders placed or performed in visit on 10/10/19   Basic Metabolic Panel   Result Value Ref Range    Sodium 139 136 - 145 mmol/L    Potassium 4.5 3.5 - 5.0 mmol/L    Chloride 109 (H) 98 - 107 mmol/L    CO2 25 22 - 31 mmol/L    Anion Gap, Calculation 5 5 - 18 mmol/L    Glucose 97 70 - 125 mg/dL    Calcium 8.9 8.5 - 10.5 mg/dL    BUN 29 (H) 8 - 22 mg/dL    Creatinine 0.65 (L) 0.70 - 1.30 mg/dL    GFR MDRD Af Amer >60 >60 mL/min/1.73m2    GFR MDRD Non Af Amer >60 >60 mL/min/1.73m2           No results found for: HGBA1C  No results found for: [id number]  No results found for: [id number]    ASSESSMENT/PLAN:  1. Rhinitis medicamentosa    2. Insomnia, unspecified type    3. Cognitive impairment    4. Failure to thrive in adult    5. Lymphedema      1. Continue with current plan of care and f/u with ENT as directed. He refused any off to trial other options toady.  2. Start trazodone 25mg po q hs for sleep.   3. Continue to  involve SW in as well as family, all decisions and planning.  4. Some of this weight gain may be due to improved appetite and po intake. However, this certainly does not account for 40lbs any by PE, his upper body remains thin with low muscle mass, and LE with severe lymphadema.   5. Continue lymphadema wraps, diuretics and daily weights.     Electronically signed by:  Kofi Pickard CNP  This progress note was completed using Dragon software and there may be grammatical errors.

## 2021-06-02 NOTE — PROGRESS NOTES
[facility]      NAME:  Rashad Caputo             :  1950    MRN: [id number]    CODE STATUS:  FULL CODE    FACILITY: [facility] [[id number]]         CHIEF COMPLAIN/REASON FOR VISIT:  Chief Complaint   Patient presents with     Problem Visit       HISTORY OF PRESENT ILLNESS: Rashad Caputo is a 69 y.o. male being seen per request of nursing due to swollen left arm. . Seen today,in room , moaning in pain, reports his left arm is very painful. It is noted to be warm and swollen. Per PMH: Patient presented to the hospital with acute urinary retention.  He had a Shen catheter placed and currently discharged with an indwelling Shen catheter.  He will follow with urology for a voiding trial. He was noted to have hematuria on , improved today. He developed a UTI cultures grew strep viridans.  He was given Levaquin 7 days post discharge.He has bowel issues of fecal incontinence, ongoing. Reports some pain to left knee, managed with analgesics.   He was to go to  for Folay removal today, but once again cancelled his apt. He was educated ( again) on risk benefits of prolonged catheter use. He reports to me that he promises to have it removed by next Tuesday.   He does continue with therapies and SN on TCU for management of chronic disease status as well as assist in ADLS and bladder care. Sw has been searching for dc planning as pt will require AL. Rashad reports overall feeling of well being with pain managed. OT is now following for  lymph wraps. Pt was to have shen our per  orders, he did have an apointment with  this am for shen removal, but in lieu of increased arm pain and swelling he will need an ER eval for futher diagnostics.     Allergies   Allergen Reactions     Lisinopril Shortness Of Breath and Dizziness     Fosinopril Sodium Other (See Comments) and Dizziness     Mouth numbness     Amoxicillin-Pot Clavulanate Hives and Rash     Cephalosporins  Hives and Rash     Penicillins Rash   :     Current Outpatient Medications   Medication Sig     acetaminophen (TYLENOL) 325 MG tablet Take 650 mg by mouth 4 (four) times a day.     acetaminophen (TYLENOL) 500 MG tablet Take 500-1,000 mg by mouth every 4 (four) hours as needed for pain. Do not exceed 4000 mg in 24 hours     cyclobenzaprine (FLEXERIL) 10 MG tablet Take 1 tablet (10 mg total) by mouth 2 (two) times a day as needed for muscle spasms.     diclofenac sodium (VOLTAREN) 1 % Gel Apply 1 g topically 3 (three) times a day. Apply to bilateral knees/hips     diclofenac sodium (VOLTAREN) 1 % Gel Apply 1 g topically as needed. To hips/knees/ as needed     fluticasone propionate (FLONASE) 50 mcg/actuation nasal spray Apply 1 spray into each nostril every 8 (eight) hours as needed. For overuse of nasal decongestant.            furosemide (LASIX) 20 MG tablet Take 20 mg by mouth daily.     gabapentin (NEURONTIN) 100 MG capsule Take 100 mg by mouth 2 (two) times a day.     magnesium oxide (MAG-OX) 400 mg (241.3 mg magnesium) tablet Take 400 mg by mouth daily.     naproxen sodium (ALEVE) 220 MG tablet Take 220 mg by mouth 2 (two) times a day with meals.            phenylephrine (NICOLE-SYNEPHRINE) 1 % Spry 1-2 sprays into each nostril every 4 (four) hours as needed.            polyethylene glycol (MIRALAX) 17 gram packet Take 1 packet (17 g total) by mouth daily.     potassium chloride (K-DUR,KLOR-CON) 20 MEQ tablet Take 20 mEq by mouth daily.     sodium chloride (OCEAN) 0.65 % nasal spray 1 spray into each nostril as needed for congestion. Use with phenylephrine spray.     tamsulosin (FLOMAX) 0.4 mg cap Take 0.4 mg by mouth Daily after breakfast.            tiZANidine (ZANAFLEX) 4 MG tablet Take 4 mg by mouth twice a day with lunch and supper.         REVIEW OF SYSTEMS:    Currently, no fever, chills, or rigors. Does not have any visual or hearing problems. Denies any chest pain, headaches, palpitations, lightheadedness,  dizziness, shortness of breath, or cough. Appetite is good. Denies any GERD symptoms. Denies any difficulty with swallowing, nausea, or vomiting.  Denies any abdominal pain, was constipated now with loose stools d/t prune juice. Denies any urinary symptoms other then retention,. No insomnia. No active bleeding other then ongoing hematuria. No rash.       PHYSICAL EXAMINATION:  Vitals:    11/01/19 1525   BP: 140/70   Pulse: 100   Temp: 99  F (37.2  C)         GENERAL: Awake, Alert, oriented x3,unkempt in appearance,  not in any form of acute distress, answers questions appropriately, follows simple commands, conversant  HEENT: Head is normocephalic with normal hair distribution. No evidence of trauma. Ears: No acute purulent discharge. Eyes: Conjunctivae pink with no scleral jaundice. Nose: Normal mucosa and septum. NECK: Supple with no cervical or supraclavicular lymphadenopathy. Trachea is midline.   CHEST: No tenderness or deformity, no crepitus  LUNG: Clear to auscultation with good chest expansion. There are no crackles or wheezes, normal AP diameter.  BACK: No kyphosis of the thoracic spine. Symmetric, no curvature, ROM normal, no CVA tenderness, no spinal tenderness   CVS: There is good S1  S2,  rhythm is regular.  ABDOMEN: Globular and soft, nontender to palpation, non distended, no masses, no organomegaly, good bowel sounds, no rebound or guarding, no peritoneal signs.   EXTREMITIES: ROM UE WNL, left arm with increased pain and Swelling  SKIN: Warm and dry, no erythema noted, no rashes or lesions.  NEUROLOGICAL: The patient is oriented to person, place and time. Strength and sensation are grossly intact. Face is symmetric.          LABS:    Lab Results   Component Value Date    WBC 8.7 11/01/2019    HGB 9.8 (L) 11/01/2019    HCT 30.9 (L) 11/01/2019    MCV 83 11/01/2019     11/01/2019       Results for orders placed or performed in visit on 10/17/19   Basic Metabolic Panel   Result Value Ref Range     Sodium 140 136 - 145 mmol/L    Potassium 4.6 3.5 - 5.0 mmol/L    Chloride 106 98 - 107 mmol/L    CO2 29 22 - 31 mmol/L    Anion Gap, Calculation 5 5 - 18 mmol/L    Glucose 97 70 - 125 mg/dL    Calcium 8.9 8.5 - 10.5 mg/dL    BUN 34 (H) 8 - 22 mg/dL    Creatinine 0.69 (L) 0.70 - 1.30 mg/dL    GFR MDRD Af Amer >60 >60 mL/min/1.73m2    GFR MDRD Non Af Amer >60 >60 mL/min/1.73m2           No results found for: HGBA1C  No results found for: [id number]  No results found for: [id number]    ASSESSMENT/PLAN:  1. Left arm swelling      Painful kleft arm swelling, ED to eval and further diagnostic work up needed.  Electronically signed by:  Anuradha Barrett CNP  This progress note was completed using Dragon software and there may be grammatical errors.

## 2021-06-03 VITALS
BODY MASS INDEX: 25.99 KG/M2 | SYSTOLIC BLOOD PRESSURE: 131 MMHG | HEART RATE: 82 BPM | TEMPERATURE: 98.2 F | DIASTOLIC BLOOD PRESSURE: 80 MMHG | WEIGHT: 176 LBS

## 2021-06-03 VITALS
RESPIRATION RATE: 18 BRPM | HEART RATE: 88 BPM | DIASTOLIC BLOOD PRESSURE: 71 MMHG | BODY MASS INDEX: 32.19 KG/M2 | SYSTOLIC BLOOD PRESSURE: 122 MMHG | TEMPERATURE: 97.8 F | OXYGEN SATURATION: 98 % | WEIGHT: 218 LBS

## 2021-06-03 VITALS
HEART RATE: 92 BPM | DIASTOLIC BLOOD PRESSURE: 83 MMHG | TEMPERATURE: 98.4 F | SYSTOLIC BLOOD PRESSURE: 146 MMHG | WEIGHT: 198.4 LBS | BODY MASS INDEX: 29.3 KG/M2

## 2021-06-03 VITALS
HEART RATE: 97 BPM | TEMPERATURE: 97.2 F | WEIGHT: 218 LBS | SYSTOLIC BLOOD PRESSURE: 155 MMHG | DIASTOLIC BLOOD PRESSURE: 86 MMHG | BODY MASS INDEX: 32.19 KG/M2

## 2021-06-03 VITALS
WEIGHT: 183.8 LBS | TEMPERATURE: 97.6 F | BODY MASS INDEX: 27.14 KG/M2 | DIASTOLIC BLOOD PRESSURE: 77 MMHG | HEART RATE: 87 BPM | SYSTOLIC BLOOD PRESSURE: 129 MMHG

## 2021-06-03 VITALS
DIASTOLIC BLOOD PRESSURE: 84 MMHG | TEMPERATURE: 96.8 F | SYSTOLIC BLOOD PRESSURE: 145 MMHG | HEART RATE: 93 BPM | BODY MASS INDEX: 25.28 KG/M2 | WEIGHT: 171.2 LBS

## 2021-06-03 VITALS
TEMPERATURE: 97.4 F | HEART RATE: 86 BPM | SYSTOLIC BLOOD PRESSURE: 143 MMHG | DIASTOLIC BLOOD PRESSURE: 86 MMHG | WEIGHT: 206 LBS | BODY MASS INDEX: 30.42 KG/M2

## 2021-06-03 VITALS
TEMPERATURE: 98.1 F | BODY MASS INDEX: 32.37 KG/M2 | SYSTOLIC BLOOD PRESSURE: 104 MMHG | HEART RATE: 94 BPM | DIASTOLIC BLOOD PRESSURE: 81 MMHG | WEIGHT: 219.2 LBS

## 2021-06-03 VITALS
WEIGHT: 178.8 LBS | DIASTOLIC BLOOD PRESSURE: 62 MMHG | HEART RATE: 87 BPM | TEMPERATURE: 98.2 F | SYSTOLIC BLOOD PRESSURE: 132 MMHG | BODY MASS INDEX: 26.4 KG/M2

## 2021-06-03 VITALS
TEMPERATURE: 96.8 F | HEART RATE: 94 BPM | SYSTOLIC BLOOD PRESSURE: 129 MMHG | DIASTOLIC BLOOD PRESSURE: 70 MMHG | BODY MASS INDEX: 32.28 KG/M2 | WEIGHT: 218.6 LBS

## 2021-06-03 VITALS
SYSTOLIC BLOOD PRESSURE: 121 MMHG | DIASTOLIC BLOOD PRESSURE: 74 MMHG | HEART RATE: 87 BPM | WEIGHT: 184.4 LBS | BODY MASS INDEX: 27.23 KG/M2 | TEMPERATURE: 97.1 F

## 2021-06-03 VITALS
BODY MASS INDEX: 32.49 KG/M2 | WEIGHT: 220 LBS | SYSTOLIC BLOOD PRESSURE: 107 MMHG | TEMPERATURE: 97 F | OXYGEN SATURATION: 98 % | DIASTOLIC BLOOD PRESSURE: 68 MMHG | HEART RATE: 97 BPM

## 2021-06-03 VITALS
BODY MASS INDEX: 25.28 KG/M2 | WEIGHT: 171.2 LBS | HEART RATE: 59 BPM | SYSTOLIC BLOOD PRESSURE: 126 MMHG | DIASTOLIC BLOOD PRESSURE: 66 MMHG | TEMPERATURE: 96.3 F

## 2021-06-03 VITALS
WEIGHT: 178.8 LBS | BODY MASS INDEX: 26.4 KG/M2 | SYSTOLIC BLOOD PRESSURE: 149 MMHG | TEMPERATURE: 96.6 F | DIASTOLIC BLOOD PRESSURE: 84 MMHG | HEART RATE: 85 BPM

## 2021-06-03 VITALS
TEMPERATURE: 96.6 F | SYSTOLIC BLOOD PRESSURE: 163 MMHG | DIASTOLIC BLOOD PRESSURE: 83 MMHG | HEART RATE: 97 BPM | WEIGHT: 222.8 LBS | BODY MASS INDEX: 32.9 KG/M2

## 2021-06-03 NOTE — PROGRESS NOTES
[facility]    Facility:   [facility] [[id number]]   Code Status: POLST AVAILABLE      CHIEF COMPLAINT/REASON FOR VISIT:  Chief Complaint   Patient presents with     Problem Visit     left arm edema       HISTORY:      HPI: Rashad is a 69 y.o. male who is currently at [facility] s/p hospitalization for acute rehabilitation.  Rashad  has a past medical history of Arthritis, Benign prostatic hyperplasia with lower urinary tract symptoms (2/23/2017), BPH (benign prostatic hypertrophy), Coronary artery disease, History of transfusion (1975), Hypertension, Pneumonia (1980), Post-void dribbling (2/23/2017), Retinal tear of right eye, Rhinitis medicamentosa (2/23/2017), Spinal stenosis, Spinal stenosis, and UTI (urinary tract infection).    Today Mr. Captuo is being evaluated per nursing staff request for increased edema in left arm.  Rashad denied pain in his arm but tenderness upon palpation at his shoulder joint.  Nursing staff noted that the patient has had lymphedema therapy over the past couple of weeks with no success and increased edema from +2 to +4 over the past couple of days.  He denied numbness and tingling in his fingers and feels that his sensation remains intact.  He has a mass on his upper arm likely related to edema that nursing staff have previously noted.  Previous evaluation has shown shoulder dislocation and it was recommended that he have a follow-up MRI that he has not had yet at this time.  The patient denied lightheadedness, dizziness, breathing difficulty, chest pain, palpitations, constipation, urinary symptoms, numbness or tingling in extremities, and pain.      Past Medical History:   Diagnosis Date     Arthritis      Benign prostatic hyperplasia with lower urinary tract symptoms 2/23/2017     BPH (benign prostatic hypertrophy)      Coronary artery disease      History of transfusion 1975     Hypertension      Pneumonia 1980    had scar tissue,  double pneumonia     Post-void dribbling 2017     Retinal tear of right eye      Rhinitis medicamentosa 2017     Spinal stenosis      Spinal stenosis      UTI (urinary tract infection)              No family history on file.  Social History     Socioeconomic History     Marital status:      Spouse name: Not on file     Number of children: Not on file     Years of education: Not on file     Highest education level: Not on file   Occupational History     Not on file   Social Needs     Financial resource strain: Not on file     Food insecurity:     Worry: Not on file     Inability: Not on file     Transportation needs:     Medical: Not on file     Non-medical: Not on file   Tobacco Use     Smoking status: Former Smoker     Packs/day: 0.00     Last attempt to quit: 1985     Years since quittin.9     Smokeless tobacco: Never Used   Substance and Sexual Activity     Alcohol use: No     Drug use: No     Comment: Reports nasal spray abuse.     Sexual activity: Not on file   Lifestyle     Physical activity:     Days per week: Not on file     Minutes per session: Not on file     Stress: Not on file   Relationships     Social connections:     Talks on phone: Not on file     Gets together: Not on file     Attends Synagogue service: Not on file     Active member of club or organization: Not on file     Attends meetings of clubs or organizations: Not on file     Relationship status: Not on file     Intimate partner violence:     Fear of current or ex partner: Not on file     Emotionally abused: Not on file     Physically abused: Not on file     Forced sexual activity: Not on file   Other Topics Concern     Not on file   Social History Narrative    Lives with family.        REVIEW OF SYSTEM:  Pertinent items are noted in HPI.  A 12 point comprehensive review of systems was negative except as noted.    PHYSICAL EXAM:   /90   Pulse 72   Temp (!) 96  F (35.6  C)   Resp 20   Wt (!) 228 lb (103.4 kg)    SpO2 98%   BMI 33.67 kg/m      General Appearance:    Alert, cooperative, no distress, appears stated age   Head:    Normocephalic, without obvious abnormality, atraumatic   Eyes:    PERRL, conjunctiva/corneas clear, both eyes        Ears:    Normal external ear canals, both ears   Nose:   Nares normal, septum midline, mucosa normal, no drainage    or sinus tenderness   Throat:   Lips, mucosa, and tongue normal; teeth and gums normal   Neck:   Supple, symmetrical, trachea midline, no adenopathy;        thyroid:  No enlargement/tenderness/nodules; no carotid    bruit or JVD   Back:     Symmetric, no curvature, ROM normal, no CVA tenderness   Lungs:     Clear to auscultation bilaterally, respirations unlabored   Chest wall:    No tenderness or deformity   Heart:    Regular rate and rhythm, S1 and S2 normal, no murmur, rub   or gallop   Abdomen:     Soft, non-tender, bowel sounds active all four quadrants,     no masses, no organomegaly   Extremities:   Extremities normal, atraumatic, no cyanosis; right arm severe edema and cold fingers; anteriorly rotated shoulder dislocation on right shoulder   Pulses:   2+ and symmetric all extremities except +1 pulses in radial and ulnar   Skin:   Skin color, texture, turgor normal, no rashes or lesions   Neurologic:   Oriented to person, place, time, and situation; exhibits logical thought processes; generalized weakness         LABS:   Lab Results   Component Value Date    WBC 8.7 11/01/2019    HGB 9.8 (L) 11/01/2019    HCT 30.9 (L) 11/01/2019     11/01/2019    CHOL 132 10/19/2014    TRIG 130 10/19/2014    HDL 32 (L) 10/19/2014    ALT 19 02/15/2019    AST 19 02/15/2019     11/21/2019    K 5.3 (H) 11/21/2019     11/21/2019    CREATININE 0.95 11/21/2019    BUN 43 (H) 11/21/2019    CO2 24 11/21/2019    TSH 1.52 10/17/2014    PSA 4.5 02/15/2013    INR 1.14 (H) 08/27/2019        ASSESSMENT:      ICD-10-CM    1. Lymphedema of left arm I89.0    2. Traumatic closed  displaced fracture of left shoulder with anterior dislocation with delayed healing, subsequent encounter S42.92XG        PLAN:      Send to ED STAT for further evaluation of acute right arm increased edema with decreased ulnar/radial pulses and known right shoulder dislocation.    Otherwise continue current care plan for all other chronic medical conditions, as they are stable. Encouraged patient to engage in healthy lifestyle behaviors such as engaging in social activities, exercising (PT/OT), eating well, and following care plan. Follow up for routine check-up, or sooner if needed. Will continue to monitor patient and work with nursing staff collaboratively to work toward positive patient outcomes.     Electronically signed by: Britni Farrell, CNP

## 2021-06-03 NOTE — PROGRESS NOTES
"  [facility]      NAME:  Rashad Caputo             :  1950    MRN: [id number]    CODE STATUS:  FULL CODE    FACILITY: [facility] [[id number]]         CHIEF COMPLAIN/REASON FOR VISIT:  Chief Complaint   Patient presents with     Review Of Multiple Medical Conditions       HISTORY OF PRESENT ILLNESS: Rashad Caputo is a 69 y.o. male being seen for review of multiple medical conditions.  Per PMH: Patient presented to the hospital with acute urinary retention.  He had a Shen catheter placed and currently discharged with an indwelling Shen catheter.  He will follow with urology for a voiding trial. He was noted to have hematuria on , improved today. He developed a UTI cultures grew strep viridans.  He was given Levaquin 7 days post discharge.He has bowel issues of fecal incontinence, ongoing. Reports some pain to left knee, managed with analgesics.   He was to go to  for Folay removal today, but once again cancelled his apt. He was educated ( again) on risk benefits of prolonged catheter use. He reports to me that he promises to have it removed by next Tuesday.   He does continue with therapies and SN on TCU for management of chronic disease status as well as assist in ADLS and bladder care. Sw has been searching for dc planning as pt will require AL. Rashad reports overall feeling of well being with pain managed. OT is now following for  lymph wraps.  Currently on Bactrim for UTI, our [place] is he will allow  to remove shen, as per Rashad he reports , \"next week\".     Allergies   Allergen Reactions     Lisinopril Shortness Of Breath and Dizziness     Fosinopril Sodium Other (See Comments) and Dizziness     Mouth numbness     Amoxicillin-Pot Clavulanate Hives and Rash     Cephalosporins Hives and Rash     Penicillins Rash   :     Current Outpatient Medications   Medication Sig     acetaminophen (TYLENOL) 325 MG tablet Take 650 mg by mouth 4 (four) times a day. "     acetaminophen (TYLENOL) 500 MG tablet Take 500-1,000 mg by mouth every 4 (four) hours as needed for pain. Do not exceed 4000 mg in 24 hours     cyclobenzaprine (FLEXERIL) 10 MG tablet Take 1 tablet (10 mg total) by mouth 2 (two) times a day as needed for muscle spasms.     diclofenac sodium (VOLTAREN) 1 % Gel Apply 1 g topically 3 (three) times a day. Apply to bilateral knees/hips     diclofenac sodium (VOLTAREN) 1 % Gel Apply 1 g topically as needed. To hips/knees/ as needed     fluticasone propionate (FLONASE) 50 mcg/actuation nasal spray Apply 1 spray into each nostril every 8 (eight) hours as needed. For overuse of nasal decongestant.            furosemide (LASIX) 20 MG tablet Take 20 mg by mouth daily.     gabapentin (NEURONTIN) 100 MG capsule Take 100 mg by mouth 2 (two) times a day.     magnesium oxide (MAG-OX) 400 mg (241.3 mg magnesium) tablet Take 400 mg by mouth daily.     naproxen sodium (ALEVE) 220 MG tablet Take 220 mg by mouth 2 (two) times a day with meals.            phenylephrine (NICOLE-SYNEPHRINE) 1 % Spry 1-2 sprays into each nostril every 4 (four) hours as needed.            polyethylene glycol (MIRALAX) 17 gram packet Take 1 packet (17 g total) by mouth daily.     potassium chloride (K-DUR,KLOR-CON) 20 MEQ tablet Take 20 mEq by mouth daily.     sodium chloride (OCEAN) 0.65 % nasal spray 1 spray into each nostril as needed for congestion. Use with phenylephrine spray.     tamsulosin (FLOMAX) 0.4 mg cap Take 0.4 mg by mouth Daily after breakfast.            tiZANidine (ZANAFLEX) 4 MG tablet Take 4 mg by mouth twice a day with lunch and supper.         REVIEW OF SYSTEMS:    Currently, no fever, chills, or rigors. Does not have any visual or hearing problems. Denies any chest pain, headaches, palpitations, lightheadedness, dizziness, shortness of breath, or cough. Appetite is good. Denies any GERD symptoms. Denies any difficulty with swallowing, nausea, or vomiting.  Denies any abdominal pain,  was constipated now with loose stools d/t prune juice. Denies any urinary symptoms other then retention,. No insomnia. No active bleeding other then ongoing hematuria. No rash.       PHYSICAL EXAMINATION:  There were no vitals filed for this visit.      GENERAL: Awake, Alert, oriented x3,unkempt in appearance,  not in any form of acute distress, answers questions appropriately, follows simple commands, conversant  HEENT: Head is normocephalic with normal hair distribution. No evidence of trauma. Ears: No acute purulent discharge. Eyes: Conjunctivae pink with no scleral jaundice. Nose: Normal mucosa and septum. NECK: Supple with no cervical or supraclavicular lymphadenopathy. Trachea is midline.   CHEST: No tenderness or deformity, no crepitus  LUNG: Clear to auscultation with good chest expansion. There are no crackles or wheezes, normal AP diameter.  BACK: No kyphosis of the thoracic spine. Symmetric, no curvature, ROM normal, no CVA tenderness, no spinal tenderness   CVS: There is good S1  S2,  rhythm is regular.  ABDOMEN: Globular and soft, nontender to palpation, non distended, no masses, no organomegaly, good bowel sounds, no rebound or guarding, no peritoneal signs.   EXTREMITIES: ROM UE WNL, left arm with increased pain and Swelling  SKIN: Warm and dry, no erythema noted, no rashes or lesions.  NEUROLOGICAL: The patient is oriented to person, place and time. Strength and sensation are grossly intact. Face is symmetric.          LABS:    Lab Results   Component Value Date    WBC 8.7 11/01/2019    HGB 9.8 (L) 11/01/2019    HCT 30.9 (L) 11/01/2019    MCV 83 11/01/2019     11/01/2019       Results for orders placed or performed in visit on 11/05/19   Basic Metabolic Panel   Result Value Ref Range    Sodium 136 136 - 145 mmol/L    Potassium 4.9 3.5 - 5.0 mmol/L    Chloride 106 98 - 107 mmol/L    CO2 25 22 - 31 mmol/L    Anion Gap, Calculation 5 5 - 18 mmol/L    Glucose 90 70 - 125 mg/dL    Calcium 8.9 8.5 -  10.5 mg/dL    BUN 39 (H) 8 - 22 mg/dL    Creatinine 0.76 0.70 - 1.30 mg/dL    GFR MDRD Af Amer >60 >60 mL/min/1.73m2    GFR MDRD Non Af Amer >60 >60 mL/min/1.73m2           No results found for: HGBA1C  No results found for: [id number]  No results found for: IQDRQLVH54    ASSESSMENT/PLAN:  1. Debilitated patient    2. Lymphedema    3. Left arm swelling        1.Debilitated t: DC planning issues, he is to decide if he will accept placement in LTC, same facility as wife. No longer able to manage at home, W/C bound, debilitated    2.Lymphedema: Still seen by therapy for wraps, has had labs reviewed and wt gain reviewed in past. Adjustments prn by therapy    3. Left arm swelling, Reports pain improving, reports from ED was possible fx and subluxation of hs left shoulder, no orders. I did request referal OT if a sling would be beneficial for support to arm. Rashad does have momment to arm. Refused offer of voltaren this am.                Electronically signed by:  Anuradha Barrett CNP  This progress note was completed using Dragon software and there may be grammatical errors.

## 2021-06-03 NOTE — PROGRESS NOTES
[facility]      NAME:  Rashad Caputo             :  1950    MRN: [id number]    CODE STATUS:  FULL CODE    FACILITY: [facility] [[id number]]         CHIEF COMPLAIN/REASON FOR VISIT:  Chief Complaint   Patient presents with     Review Of Multiple Medical Conditions       HISTORY OF PRESENT ILLNESS: Rashad Caputo is a 69 y.o. male being seen for review of multiple medical conditions,. Today wt is up to 249.7lb which is about an 8 pound wt gain in 4 days. I am increasing lasix to 40 mg po two times a day from 40. He has a shen in place, dark sabrina with sedemit.  Per PMH: Patient presented to the hospital with acute urinary retention.  He had a Shen catheter placed and currently discharged with an indwelling Shen catheter.  He will follow with urology for a voiding trial. He was noted to have hematuria on , improved today. He developed a UTI cultures grew strep viridans.   Appears edematous today, wt is up, increased his lasix and we will check BMP in am.    Allergies   Allergen Reactions     Lisinopril Shortness Of Breath and Dizziness     Fosinopril Sodium Other (See Comments) and Dizziness     Mouth numbness     Tramadol Other (See Comments)     sweating     Amoxicillin-Pot Clavulanate Hives and Rash     Cephalosporins Hives and Rash     Penicillins Rash   :     Current Outpatient Medications   Medication Sig     acetaminophen (TYLENOL) 325 MG tablet Take 650 mg by mouth 4 (four) times a day.     acetaminophen (TYLENOL) 500 MG tablet Take 500-1,000 mg by mouth every 4 (four) hours as needed for pain. Do not exceed 4000 mg in 24 hours     cyclobenzaprine (FLEXERIL) 10 MG tablet Take 1 tablet (10 mg total) by mouth 2 (two) times a day as needed for muscle spasms.     diclofenac sodium (VOLTAREN) 1 % Gel Apply 1 g topically 3 (three) times a day. Apply to bilateral knees/hips     diclofenac sodium (VOLTAREN) 1 % Gel Apply 1 g topically as needed. To  hips/knees/ as needed     fluticasone propionate (FLONASE) 50 mcg/actuation nasal spray Apply 1 spray into each nostril every 8 (eight) hours as needed. For overuse of nasal decongestant.            furosemide (LASIX) 20 MG tablet Take 40 mg by mouth daily.            gabapentin (NEURONTIN) 100 MG capsule Take 200 mg by mouth 3 (three) times a day.     magnesium oxide (MAG-OX) 400 mg (241.3 mg magnesium) tablet Take 400 mg by mouth daily.     naproxen sodium (ALEVE) 220 MG tablet Take 220 mg by mouth 2 (two) times a day with meals.            phenylephrine (NICOLE-SYNEPHRINE) 1 % Spry 1-2 sprays into each nostril every 4 (four) hours as needed.            polyethylene glycol (MIRALAX) 17 gram packet Take 1 packet (17 g total) by mouth daily.     potassium chloride (K-DUR,KLOR-CON) 10 MEQ tablet Take 10 mEq by mouth daily.     QUEtiapine (SEROQUEL) 50 MG tablet Take 50 mg by mouth at bedtime.     sodium chloride (OCEAN) 0.65 % nasal spray 1 spray into each nostril as needed for congestion. Use with phenylephrine spray.     tamsulosin (FLOMAX) 0.4 mg cap Take 0.4 mg by mouth Daily after breakfast.                REVIEW OF SYSTEMS:    Currently, no fever, chills, or rigors. Does not have any visual or hearing problems. Denies any chest pain, headaches, palpitations, lightheadedness, dizziness, shortness of breath, or cough. Appetite is good. Denies any GERD symptoms. Denies any difficulty with swallowing, nausea, or vomiting.  Denies any abdominal pain, was constipated now with loose stools d/t prune juice. Denies any urinary symptoms other then retention,. No insomnia. No active bleeding . No rash. Pain to left arm      PHYSICAL EXAMINATION:  Vitals:    12/02/19 1603   BP: 123/74   Pulse: 82   Temp: 96.6  F (35.9  C)   Weight: (!) 249 lb 11.2 oz (113.3 kg)         GENERAL: Awake, Alert, oriented x3,unkempt in appearance,  not in any form of acute distress, answers questions appropriately, follows simple commands,  conversant  HEENT: Head is normocephalic with normal hair distribution. No evidence of trauma. Ears: No acute purulent discharge. Eyes: Conjunctivae pink with no scleral jaundice. Nose: Normal mucosa and septum. NECK: Supple with no cervical or supraclavicular lymphadenopathy. Trachea is midline.   CHEST: No tenderness or deformity, no crepitus  LUNG: Clear to auscultation with good chest expansion. There are no crackles or wheezes, normal AP diameter.  BACK: No kyphosis of the thoracic spine. Symmetric, no curvature, ROM normal, no CVA tenderness, no spinal tenderness   CVS: There is good S1  S2,  rhythm is regular.  ABDOMEN: Globular and soft, nontender to palpation, non distended, no masses, no organomegaly, good bowel sounds, no rebound or guarding, no peritoneal signs.   EXTREMITIES: ROM UE WNL, left arm with increased pain and Swelling, lymphedema to legs  SKIN: Warm and dry, no erythema noted, no rashes or lesions.  NEUROLOGICAL: The patient is oriented to person, place and time. Strength and sensation are grossly intact. Face is symmetric.          LABS:    Lab Results   Component Value Date    WBC 8.7 11/01/2019    HGB 9.8 (L) 11/01/2019    HCT 30.9 (L) 11/01/2019    MCV 83 11/01/2019     11/01/2019       Results for orders placed or performed in visit on 11/21/19   Basic Metabolic Panel   Result Value Ref Range    Sodium 138 136 - 145 mmol/L    Potassium 5.3 (H) 3.5 - 5.0 mmol/L    Chloride 105 98 - 107 mmol/L    CO2 24 22 - 31 mmol/L    Anion Gap, Calculation 9 5 - 18 mmol/L    Glucose 91 70 - 125 mg/dL    Calcium 9.3 8.5 - 10.5 mg/dL    BUN 43 (H) 8 - 22 mg/dL    Creatinine 0.95 0.70 - 1.30 mg/dL    GFR MDRD Af Amer >60 >60 mL/min/1.73m2    GFR MDRD Non Af Amer >60 >60 mL/min/1.73m2           No results found for: HGBA1C  No results found for: [id number]  No results found for: [id number]    ASSESSMENT/PLAN:  1. Left arm swelling    2. Weight gain      1. Left arm swelling, he is to be wearing  edema glove and tubis, only tubi on in place.     2. His wt is up about 8 pounds in several few days, increasing lasix and we will then check am BMP as well.      Electronically signed by:  Anuradha Barrett CNP  This progress note was completed using Dragon software and there may be grammatical errors.

## 2021-06-03 NOTE — PROGRESS NOTES
[facility] Admission note      Patient: Rashad Caputo  MRN: [id number]  Date of Service: 11/19/2019      [facility] [[id number]]  Reason for Visit     Chief Complaint   Patient presents with     Review Of Multiple Medical Conditions       Code Status     Full code    Assessment     -Acute hemorrhagic cystitis with cultures growing more than 100,000 colonies of staph aureus  -Acute onset of left shoulder dislocation/anterior subluxation with no fractures been identified appears to be a long-standing condition  -Acute onset of left upper extremity swelling  -Acute back pain with underlying history of severe spinal stenoses/kyphoscoliosis with postural instability noted on exam  -Acute psychosocial stressors causing significant anxiety patient  -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  -Vulnerable adult  -Failure to thrive with difficulty functioning in his home environment.  Multiple ER visits noted with 6 emergency room visits noted     Plan     Patient has been admitted to the TCU.  Seen today at the request of the clinical psychologist along with the nurse manager.  He has been having breathing behavioral issues  He has become more paranoid and is reporting to staff that they are ignoring him or not taking care of his needs he has been using abusive language towards staff.  In the past he has refused to take any psychotropic medications he had a prolonged discussion and today the psychologist informed us that he will take the medication and signed the consent he is strongly urging Rashad to take  some Seroquel   will write Seroquel 50 mg at bedtime and see the response from that.  He had a follow-up with urology that he canceled because of pain issues.  They have repeatedly requested him to discontinue his Valentine catheter and his sister feels that both in the TCU and at the urology office  Pain was reviewed he is on scheduled naproxen along with Tylenol and gabapentin with uncontrolled pain reported mostly musculoskeletal  He has severe osteoarthritis of his back his knee is severely arthritic and deformed he has bilateral shoulder dislocations with edema  Lymph strap were discontinued due to noncompliance  Issues reviewed he understands he has bilateral anterior shoulder subluxations and they recommended follow-up MRI similarly his knee also needs a TKA.  He has not been able to keep any of these appointments as he needs an open MRI as per him.  Current pain regimen was reviewed with him  DC his Zanaflex  Increase gabapentin to 200 mg 3 times daily from 100 twice daily  Tramadol 50 mg every 6 hours as needed has been added to his regimen for additional pain relief  Discharge planning also reviewed he is still not sure if he wants to go to long-term care.  He is going for evaluation and a visit tomorrow.  Total time spent 35 minutes more than 25 minutes spent face-to-face talking with the patient reviewing mood and behavior issues including anxiety disorder and pain management as outlined in my note above  Care plan was also reviewed with nursing and his clinical psychologist .at his urging he has agreed to take Seroquel      History     Patient is a very pleasant 69 y.o. male who is admitted to TCU  Patient had acute onset of left upper extremity pain along with swelling ongoing for a couple of days.  Unfortunately work-up in the TCU was nondiagnostic.  He did not have any DVT edema was pitting and nonresponsive to diuretics he was  Sent to the emergency room for further evaluation and a repeat upper  extremity ultrasound did not show any DVT but a questionable left axillary mass.  Subsequently a CT showed an anterior subluxation of the left shoulder.  Orthopedics was consulted.  They have recommended outpatient follow-up with MRI of his left shoulder  He has bilateral anterior subluxation of both shoulders noted today left upper extremity remains painful    He also has an chronic indwelling Valentine catheter.  He was recently seen in the emergency room because of blood in his catheter he was diagnosed with hemorrhagic cystitis and given Levaquin.  He had a follow-up with urology which he chose not to attend.  He told me he did not go today because of pain issues urologist repeatedly requested a voiding trial but he is refusing    He has had progressive weight gain of more than 40 pounds with lymphedema he admits he has significant caloric restriction prior to coming to the TCU and has been overeating with excessive caloric intake.  Mood and behaviors were reviewed and he has significant issues with that he has been using abusive language towards staff he is getting more paranoid in the evening getting somewhat in verbal altercations with staff members.  He feels his care is being ignored because of this reason he is seeing psychology in the past he is reperfused to take any psychotropic medications to manage his mood behaviors and anxiety.    Past Medical History     Active Ambulatory (Non-Hospital) Problems    Diagnosis     Pain     Left arm swelling     Fluid overload     Lymphedema     Allergic rhinitis     H/O noncompliance with medical treatment, presenting hazards to health     Failure to thrive in adult     Cognitive impairment     Weight gain     Stool incontinence     Insomnia     Discharge planning issues     Debilitated patient     Urinary retention     Urinary obstruction     Urinary tract infection     Benign prostatic hyperplasia with lower urinary tract symptoms     Post-void dribbling     Rhinitis  medicamentosa     Sepsis due to urinary tract infection (H)     Sepsis (H)     CAD (coronary artery disease)     Non-STEMI (non-ST elevated myocardial infarction) (H)     Tachycardia     Anxiety     Spinal stenosis     Hypertension     Benign Prostatic Hypertrophy     Myalgia And Myositis     Urinary Tract Infection     Feelings Of Urinary Urgency     Joint Pain, Localized In The Hip     Past Medical History:   Diagnosis Date     Arthritis      Benign prostatic hyperplasia with lower urinary tract symptoms 2/23/2017     BPH (benign prostatic hypertrophy)      Coronary artery disease      History of transfusion 1975     Hypertension      Pneumonia 1980     Post-void dribbling 2/23/2017     Retinal tear of right eye      Rhinitis medicamentosa 2/23/2017     Spinal stenosis      Spinal stenosis      UTI (urinary tract infection)        Past Social History     Reviewed, and he  reports that he quit smoking about 34 years ago. He smoked 0.00 packs per day. He has never used smokeless tobacco. He reports that he does not drink alcohol or use drugs.    Family History     Reviewed, and includes cataracts and CVD in his father; his mother had dementia; grandmother had DM    Medication List     Current Outpatient Medications on File Prior to Visit   Medication Sig Dispense Refill     acetaminophen (TYLENOL) 325 MG tablet Take 650 mg by mouth 4 (four) times a day.       acetaminophen (TYLENOL) 500 MG tablet Take 500-1,000 mg by mouth every 4 (four) hours as needed for pain. Do not exceed 4000 mg in 24 hours       cyclobenzaprine (FLEXERIL) 10 MG tablet Take 1 tablet (10 mg total) by mouth 2 (two) times a day as needed for muscle spasms. 10 tablet 0     diclofenac sodium (VOLTAREN) 1 % Gel Apply 1 g topically 3 (three) times a day. Apply to bilateral knees/hips       diclofenac sodium (VOLTAREN) 1 % Gel Apply 1 g topically as needed. To hips/knees/ as needed       fluticasone propionate (FLONASE) 50 mcg/actuation nasal spray  Apply 1 spray into each nostril every 8 (eight) hours as needed. For overuse of nasal decongestant.              furosemide (LASIX) 20 MG tablet Take 40 mg by mouth daily.              gabapentin (NEURONTIN) 100 MG capsule Take 100 mg by mouth 2 (two) times a day.       magnesium oxide (MAG-OX) 400 mg (241.3 mg magnesium) tablet Take 400 mg by mouth daily.       naproxen sodium (ALEVE) 220 MG tablet Take 220 mg by mouth 2 (two) times a day with meals.              phenylephrine (NICOLE-SYNEPHRINE) 1 % Spry 1-2 sprays into each nostril every 4 (four) hours as needed.              polyethylene glycol (MIRALAX) 17 gram packet Take 1 packet (17 g total) by mouth daily.  0     potassium chloride (K-DUR,KLOR-CON) 20 MEQ tablet Take 20 mEq by mouth daily.       sodium chloride (OCEAN) 0.65 % nasal spray 1 spray into each nostril as needed for congestion. Use with phenylephrine spray.       tamsulosin (FLOMAX) 0.4 mg cap Take 0.4 mg by mouth Daily after breakfast.              tiZANidine (ZANAFLEX) 4 MG tablet Take 4 mg by mouth twice a day with lunch and supper.       No current facility-administered medications on file prior to visit.        Allergies     Allergies   Allergen Reactions     Lisinopril Shortness Of Breath and Dizziness     Fosinopril Sodium Other (See Comments) and Dizziness     Mouth numbness     Amoxicillin-Pot Clavulanate Hives and Rash     Cephalosporins Hives and Rash     Penicillins Rash       Review of Systems   A comprehensive review of 14 systems was done. Pertinent findings noted here and in history of present illness. All the rest negative.  Constitutional: Negative.  Negative for fever, chills, activity change, appetite change and fatigue  He is having significant weight gain of almost 40 pounds since admission.   HENT: Negative for congestion and facial swelling.    Eyes: Negative for photophobia, redness and visual disturbance.   Respiratory: Negative for cough and chest tightness.     Cardiovascular: Negative for chest pain, palpitations and leg swelling.   Gastrointestinal: Negative for nausea, diarrhea, constipation, blood in stool and abdominal distention.   Genitourinary: Negative.    Musculoskeletal: Negative.    Skin: Negative.  Hyperpigmented rescaling noted in both of his hands.  Patient told me it is from chronic handwashing  Neurological: Negative for dizziness, tremors, syncope, weakness, light-headedness and headaches.   Hematological: Does not bruise/bleed easily.   Psychiatric/Behavioral: Negative.  Patient is reporting insomnia.  Mood and behaviors have shown some dysregulation with outbursts reported by staff      Physical Exam     Recent Vitals 11/19/2019   Weight 223 lbs 6 oz   BSA (m2) 2.22 m2   /81   Pulse 85   Temp 98.6   Temp src -   SpO2 -   Some recent data might be hidden   Recheck weight now is 221 pounds    Constitutional: Oriented to person, place, and time and appears well-developed.   Looks very disabled and deconditioned  HEENT:  Normocephalic and atraumatic.  Eyes: Conjunctivae and EOM are normal. Pupils are equal, round, and reactive to light. No discharge.  No scleral icterus. Nose normal. Mouth/Throat: Oropharynx is clear and moist. No oropharyngeal exudate.    NECK: Normal range of motion. Neck supple. No JVD present. No tracheal deviation present. No thyromegaly present.   CARDIOVASCULAR: Normal rate, regular rhythm and intact distal pulses.  Exam reveals no gallop and no friction rub.  Systolic murmur present.  PULMONARY: Effort normal and breath sounds normal. No respiratory distress.No Wheezing or rales.  ABDOMEN: Soft. Bowel sounds are normal. No distension and no mass.  There is no tenderness. There is no rebound and no guarding. No HSM.  Open area noted on his right buttock which appears to be chronic with hyperpigmented changes noted with irritation on his buttocks  MUSCULOSKELETAL: Normal range of motion. 1+ edema and no tenderness. Mild  kyphosis, no tenderness.  Right lower extremity is swollen with profound arthritic changes noted with swelling in his right knee.  On standing leg is crooked and patient is not able to stand up straight.  Profound kyphoscoliosis of his back also noted and he could not straighten himself up he was in severe pain  Left upper extremity is painful with limited range of movement and has 2+ pitting edema  Anterior subluxation of bilateral shoulder joints noted on exam  LYMPH NODES: Has no cervical, supraclavicular, axillary and groin adenopathy.   NEUROLOGICAL: Alert and oriented to person, place, and time. No cranial nerve deficit.  Normal muscle tone. Coordination normal.   GENITOURINARY: Deferred exam.  Valentine present  SKIN: Skin is warm and dry. No rash noted. No erythema. No pallor.   Hypopigmentary changes noted in his both fingers and hands which patient attributes to chronic handwashing  EXTREMITIES: No cyanosis, no clubbing,1+ edema. No Deformity.  PSYCHIATRIC: Normal mood, affect and behavior.  Has chronic anxiety      Lab Results       Lab Results   Component Value Date    ALBUMIN 4.0 02/15/2019    ALT 19 02/15/2019    AST 19 02/15/2019    BUN 39 (H) 11/05/2019    CALCIUM 8.9 11/05/2019     11/05/2019    CHOL 132 10/19/2014    CO2 25 11/05/2019    CREATININE 0.76 11/05/2019    GFRAA >60 11/05/2019    GFRNONAA >60 11/05/2019    GLU 90 11/05/2019    HCT 30.9 (L) 11/01/2019    WBC 8.7 11/01/2019    HGB 9.8 (L) 11/01/2019    MG 1.8 09/08/2019     11/01/2019    K 4.9 11/05/2019    PSA 4.5 02/15/2013     11/05/2019         Post Discharge Medication Reconciliation Status: discharge medications reconciled and changed, per note/orders (see AVS)      NICKOLAS Alvarado

## 2021-06-03 NOTE — PROGRESS NOTES
[facility] Admission note      Patient: Rashad Caputo  MRN: [id number]  Date of Service: 11/21/2019      [facility] [[id number]]  Reason for Visit     Chief Complaint   Patient presents with     Review Of Multiple Medical Conditions       Code Status     Full code    Assessment     -Lymphedema left upper extremity now with dusky pigmentation and open wound close to his elbow secondary to pulling of fluids  -Acute onset of left shoulder dislocation/anterior subluxation with no fractures been identified appears to be a long-standing condition  -Acute pain secondary to osteoarthritis not improving in spite of multiple pain pills  -Acute back pain with underlying history of severe spinal stenoses/kyphoscoliosis with postural instability noted on exam  -Acute psychosocial stressors causing significant anxiety patient  -Chronic stage II pressure sores on buttocks  -Severe osteoarthritis bone-on-bone end-stage of the right knee associated with deformity and contractures  -Profound limitation in mobility with patient essentially bedbound requiring 2 person assist for even postural changes having extreme difficulty standing  -Suspected underlying personality and cognitive changes.  -Vulnerable adult  -Failure to thrive with difficulty functioning /progressive decline noted since patient has been admitted to TCU requiring more assistance with cares  -Hyperkalemia noted on recheck BMP    Plan     Patient has been admitted to the TCU.  Patient was examined for left upper extremity swelling.  Care plan was reviewed with him  Subsequently he was reexamined the presence and request of the nursing manager and  to discuss goals of care.  He understands that he has significant lymphedema left upper extremity due to vascular compromise of his left upper extremity he has lymphedema sleeve but now he has severe lymphedema of his left upper arm   this is not resolving with just elevation and  using a lymphedema sleeve  He is now getting blisters which are bursting open now he has open wounds in his left arm which puts him at risk of cellulitis and vascular compromise  He was seen by orthopedics and recommended to go for MRI in preparation for surgery  Appointment was made and he refused to go  He had another appointment with urology that he refused to go to  He has severe osteoarthritis with deformity and arthritis and inability to ambulate noted in his right knee for which she is refusing to follow-up with orthopedics 2.  He has severe dislocation of his right shoulder joint which is also chronic pain management has been ineffective  He does not want to try narcotics; tramadol has caused him reaction so he is refusing to use it  He reports ineffective pain relief with naproxen and Tylenol and nursing reports that he frequently will refuse to use it yet he reports his pain is quite high as 8 out of 10 at best even with all pain pills on board.  Various other options including getting intralesional steroid injection and considering surgical option have been refused but the patient I did discuss with him and discuss his options  My myself as well as the nursing manager and the  reviewed his care plan and give him the option of giving up on his cares and excepting bad outcomes and going on palliative approach.  He refuses that he thinks he is too young and needs to be full code  He has been given the option of either going to the hospital and getting emergent care for his bilateral shoulder dislocations with left upper extremity swelling versus urgently having an MRI done and following up with Ortho without any cancellation appointments  He has chosen the second option.  He will however not lower MRI even if I offer him to give him an Ativan prior to the procedure.  Open MRI with CDI has been ordered for him for both his shoulders  Patient is aware of adverse outcomes if he leaves his shoulder  dislocation untreated in his left upper extremity  Discharge planning also reviewed with him he still debating where he wants to go  Hyperkalemia concerns were reviewed with him and we will DC his potassium supplement and recheck potassium again  Overall I have explained to him that instead of improving in the TCU he is declining  We are not able to help him and we are strongly urging him either to follow as an outpatient or consider going back to the hospital to get the help that he needs he does not want to go back to the hospital either.  Total time spent is 35 minutes more than 30 minutes spent face-to-face talking to this patient reviewing care plan including goals of care which patient chooses to be full code and also need for urgent evaluation and starting a treatment plan which include surgical intervention for his severe osteoarthritis and dislocation of his shoulders especially his left upper extremity to minimize risk of vascular compromise/bad outcomes  Patient is aware that if he cancels another appointment and decides not to pursue MRI or follow-up with orthopedics he will have no option but to go back to the hospital  Also cautioned him about progressive weight loss due to excessive caloric intake    History     Patient is a very pleasant 69 y.o. male who is admitted to TCU  Patient had acute onset of left upper extremity pain along with swelling ongoing for a couple of days.  Unfortunately work-up in the TCU was nondiagnostic.  He did not have any DVT edema was pitting and nonresponsive to diuretics he was  Sent to the emergency room for further evaluation and a repeat upper extremity ultrasound did not show any DVT but a questionable left axillary mass.  Subsequently a CT showed an anterior subluxation of the left shoulder.  Orthopedics was consulted.  They have recommended outpatient follow-up with MRI of his left shoulder  He has bilateral anterior subluxation of both shoulders noted today left  upper extremity remains painful  Unfortunately no resolution he has refused to go for MRI appointment  He has refused to follow-up with orthopedics his arm swelling is getting worse with no wounds been noted in spite of the lymphedema sleeve he is in constant pain because of this reason    He also has an chronic indwelling Valentine catheter.  He was recently seen in the emergency room because of blood in his catheter he was diagnosed with hemorrhagic cystitis and given Levaquin.  He had a follow-up with urology which he chose not to attend.  He told me he did not go today because of pain issues urologist repeatedly requested a voiding trial but he is refusing    He has had progressive weight gain of more than 40 pounds with lymphedema he admits he has significant caloric restriction prior to coming to the TCU and has been overeating with excessive caloric intake.  Staff is reporting the family has been bringing him extra food    He has been started on Lasix due to progressive weight gain recheck labs do show hyperkalemia potassium of 5.3    Mood and behaviors were reviewed and he has significant issues with that he has been using abusive language towards staff he is getting more paranoid in the evening getting somewhat in verbal altercations with staff members.  He feels his care is being ignored because of this reason he is seeing psychology in the past he is reperfused to take any psychotropic medications to manage his mood behaviors and anxiety.    Past Medical History     Active Ambulatory (Non-Hospital) Problems    Diagnosis     Pain     Left arm swelling     Fluid overload     Lymphedema     Allergic rhinitis     H/O noncompliance with medical treatment, presenting hazards to health     Failure to thrive in adult     Cognitive impairment     Weight gain     Stool incontinence     Insomnia     Discharge planning issues     Debilitated patient     Urinary retention     Urinary obstruction     Urinary tract  infection     Benign prostatic hyperplasia with lower urinary tract symptoms     Post-void dribbling     Rhinitis medicamentosa     Sepsis due to urinary tract infection (H)     Sepsis (H)     CAD (coronary artery disease)     Non-STEMI (non-ST elevated myocardial infarction) (H)     Tachycardia     Anxiety     Spinal stenosis     Hypertension     Benign Prostatic Hypertrophy     Myalgia And Myositis     Urinary Tract Infection     Feelings Of Urinary Urgency     Joint Pain, Localized In The Hip     Past Medical History:   Diagnosis Date     Arthritis      Benign prostatic hyperplasia with lower urinary tract symptoms 2/23/2017     BPH (benign prostatic hypertrophy)      Coronary artery disease      History of transfusion 1975     Hypertension      Pneumonia 1980     Post-void dribbling 2/23/2017     Retinal tear of right eye      Rhinitis medicamentosa 2/23/2017     Spinal stenosis      Spinal stenosis      UTI (urinary tract infection)        Past Social History     Reviewed, and he  reports that he quit smoking about 34 years ago. He smoked 0.00 packs per day. He has never used smokeless tobacco. He reports that he does not drink alcohol or use drugs.    Family History     Reviewed, and includes cataracts and CVD in his father; his mother had dementia; grandmother had DM    Medication List     Current Outpatient Medications on File Prior to Visit   Medication Sig Dispense Refill     acetaminophen (TYLENOL) 325 MG tablet Take 650 mg by mouth 4 (four) times a day.       acetaminophen (TYLENOL) 500 MG tablet Take 500-1,000 mg by mouth every 4 (four) hours as needed for pain. Do not exceed 4000 mg in 24 hours       cyclobenzaprine (FLEXERIL) 10 MG tablet Take 1 tablet (10 mg total) by mouth 2 (two) times a day as needed for muscle spasms. 10 tablet 0     diclofenac sodium (VOLTAREN) 1 % Gel Apply 1 g topically 3 (three) times a day. Apply to bilateral knees/hips       diclofenac sodium (VOLTAREN) 1 % Gel Apply 1 g  topically as needed. To hips/knees/ as needed       fluticasone propionate (FLONASE) 50 mcg/actuation nasal spray Apply 1 spray into each nostril every 8 (eight) hours as needed. For overuse of nasal decongestant.              furosemide (LASIX) 20 MG tablet Take 40 mg by mouth daily.              gabapentin (NEURONTIN) 100 MG capsule Take 100 mg by mouth 2 (two) times a day.       magnesium oxide (MAG-OX) 400 mg (241.3 mg magnesium) tablet Take 400 mg by mouth daily.       naproxen sodium (ALEVE) 220 MG tablet Take 220 mg by mouth 2 (two) times a day with meals.              phenylephrine (NICOLE-SYNEPHRINE) 1 % Spry 1-2 sprays into each nostril every 4 (four) hours as needed.              polyethylene glycol (MIRALAX) 17 gram packet Take 1 packet (17 g total) by mouth daily.  0     potassium chloride (K-DUR,KLOR-CON) 20 MEQ tablet Take 20 mEq by mouth daily.       sodium chloride (OCEAN) 0.65 % nasal spray 1 spray into each nostril as needed for congestion. Use with phenylephrine spray.       tamsulosin (FLOMAX) 0.4 mg cap Take 0.4 mg by mouth Daily after breakfast.              tiZANidine (ZANAFLEX) 4 MG tablet Take 4 mg by mouth twice a day with lunch and supper.       No current facility-administered medications on file prior to visit.        Allergies     Allergies   Allergen Reactions     Lisinopril Shortness Of Breath and Dizziness     Fosinopril Sodium Other (See Comments) and Dizziness     Mouth numbness     Amoxicillin-Pot Clavulanate Hives and Rash     Cephalosporins Hives and Rash     Penicillins Rash       Review of Systems   A comprehensive review of 14 systems was done. Pertinent findings noted here and in history of present illness. All the rest negative.  Constitutional: Negative.  Negative for fever, chills, activity change, appetite change and fatigue  He is having significant weight gain of almost 40 pounds since admission.   HENT: Negative for congestion and facial swelling.    Eyes: Negative for  photophobia, redness and visual disturbance.   Respiratory: Negative for cough and chest tightness.    Cardiovascular: Negative for chest pain, palpitations and leg swelling.   Gastrointestinal: Negative for nausea, diarrhea, constipation, blood in stool and abdominal distention.   Genitourinary: Negative.    Musculoskeletal: Negative.  Left arm is very swollen reporting chronic pain and he is significantly noted to be in distress because of this reason  Skin: Negative.    Neurological: Negative for dizziness, tremors, syncope, weakness, light-headedness and headaches.   Hematological: Does not bruise/bleed easily.   Psychiatric/Behavioral: Negative.  Patient is reporting insomnia.  Mood and behaviors have shown some dysregulation with outbursts reported by staff      Physical Exam     Recent Vitals 11/19/2019   Weight 223 lbs 6 oz   BSA (m2) 2.22 m2   /81   Pulse 85   Temp 98.6   Temp src -   SpO2 -   Some recent data might be hidden       Constitutional: Oriented to person, place, and time and appears well-developed.   Looks very disabled and deconditioned; and some degree of moderate distress  HEENT:  Normocephalic and atraumatic.  Eyes: Conjunctivae and EOM are normal. Pupils are equal, round, and reactive to light. No discharge.  No scleral icterus. Nose normal. Mouth/Throat: Oropharynx is clear and moist. No oropharyngeal exudate.    NECK: Normal range of motion. Neck supple. No JVD present. No tracheal deviation present. No thyromegaly present.   CARDIOVASCULAR: Normal rate, regular rhythm and intact distal pulses.  Exam reveals no gallop and no friction rub.  Systolic murmur present.  PULMONARY: Effort normal and breath sounds normal. No respiratory distress.No Wheezing or rales.  ABDOMEN: Soft. Bowel sounds are normal. No distension and no mass.  There is no tenderness. There is no rebound and no guarding. No HSM.  Open area noted on his right buttock which appears to be chronic with hyperpigmented  changes noted with irritation on his buttocks  MUSCULOSKELETAL: Normal range of motion. 1+ edema and no tenderness. Mild kyphosis, no tenderness.  Right lower extremity is swollen with profound arthritic changes noted with now noted to have dusky pigmentation as well as open wound around his elbow from a popped blister  Right shoulder also shows anterior dislocation  swelling in his right knee.  On standing leg is crooked and patient is not able to stand up straight.  Profound kyphoscoliosis of his back also noted and he could not straighten himself up he was in severe pain  Left upper extremity is painful with limited range of movement and has 2+ pitting edema  Anterior subluxation of bilateral shoulder joints noted on exam  LYMPH NODES: Has no cervical, supraclavicular, axillary and groin adenopathy.   NEUROLOGICAL: Alert and oriented to person, place, and time. No cranial nerve deficit.  Normal muscle tone. Coordination normal.   GENITOURINARY: Deferred exam.  Valentine present  SKIN: Skin is warm and dry. No rash noted. No erythema. No pallor.   Hypopigmentary changes noted in his both fingers and hands which patient attributes to chronic handwashing  EXTREMITIES: No cyanosis, no clubbing,1+ edema. No Deformity.  PSYCHIATRIC: Normal mood, affect and behavior.  Has chronic anxiety      Lab Results       Lab Results   Component Value Date    ALBUMIN 4.0 02/15/2019    ALT 19 02/15/2019    AST 19 02/15/2019    BUN 43 (H) 11/21/2019    CALCIUM 9.3 11/21/2019     11/21/2019    CHOL 132 10/19/2014    CO2 24 11/21/2019    CREATININE 0.95 11/21/2019    GFRAA >60 11/21/2019    GFRNONAA >60 11/21/2019    GLU 91 11/21/2019    HCT 30.9 (L) 11/01/2019    WBC 8.7 11/01/2019    HGB 9.8 (L) 11/01/2019    MG 1.8 09/08/2019     11/01/2019    K 5.3 (H) 11/21/2019    PSA 4.5 02/15/2013     11/21/2019         Post Discharge Medication Reconciliation Status: discharge medications reconciled and changed, per note/orders  (see AVS)      NICKOLAS Alvarado

## 2021-06-03 NOTE — PROGRESS NOTES
[facility] Admission note      Patient: Rashad Caputo  MRN: [id number]  Date of Service: 11/7/2019      [facility] [[id number]]  Reason for Visit     Chief Complaint   Patient presents with     Review Of Multiple Medical Conditions       Code Status     Full code    Assessment     -Acute hemorrhagic cystitis with cultures growing more than 100,000 colonies of staph aureus  -Acute onset of left shoulder dislocation/anterior subluxation with no fractures been identified appears to be a long-standing condition  -Acute onset of left upper extremity swelling  -Acute back pain with underlying history of severe spinal stenoses/kyphoscoliosis with postural instability noted on exam  -Acute psychosocial stressors causing significant anxiety patient  -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  -Vulnerable adult  -Failure to thrive with difficulty functioning in his home environment.  Multiple ER visits noted with 6 emergency room visits noted     Plan     Patient has been admitted to the TCU.  He was examined at his request.  He currently has an indwelling Valentine catheter.  He attributes his retention issues to underlying history of spinal stenoses.  Recently seen and diagnosed with staph aureus hemorrhagic cystitis.  He was initially given Levaquin but switch to Bactrim DS for 7 days based on cultures and sensitivities.  He is not reporting any acute hematuria today.  Unfortunately he did cancel his follow-up appointment with urology he is not sure if he wants to pursue any further surgeries regarding this  issue.  We did talk about his left upper extremity swelling.  I suspect this is positional because of dislocation of his shoulder.  He has a follow-up appointment with orthopedics.  Therapy assessment for positioning and possible splinting requested for him.  Advised to use a compression sleeve and elevate his extremity to see if the swelling will come down Doppler ultrasound has been negative.  Suspect this is because of compression with dislocation.  He has multiple severe osteoarthritis with deformities including bilateral shoulder subluxations noted on imaging along with severe osteoarthritis of the right knee with deformity noted he also has chronic back pain issues wiTH severe pain at present he is declining quite rapidly and can barely participate in therapy because of this issue.  We did talk about seeing his surgeon to discuss surgical option however in advanced osteoarthritis I do not know if this would be effective for him.  He is also complaining of multiple psychosocial stressors which are aggravating his mood and causing more anxiety.  He has been seeing psychology and ongoing basis however he does not want to try any medications was encouraged to think about it again.  Total time spent is 35 minutes more than 30 minutes spent face-to-face talking to this patient reviewing his care plan including his recent emergency room visit regarding hemorrhagic cystitis as well as his shoulder issue and because of swelling of his left upper extremity.  Follow-up with orthopedics was encouraged.  He can discuss with them need for injection splinting versus surgical treatment for his shoulder and knee issues he is not very keen but again encouraged to make an appointment and keep it just to get a second opinion if possible.  Discharge planning also reviewed with him he is moving to another facility soon to be closer to his wife who has been declared a vulnerable adult along with him        History     Patient is a  very pleasant 69 y.o. male who is admitted to TCU  Patient had acute onset of left upper extremity pain along with swelling ongoing for a couple of days.  Unfortunately work-up in the TCU was nondiagnostic.  He did not have any DVT edema was pitting and nonresponsive to diuretics he was  Sent to the emergency room for further evaluation and a repeat upper extremity ultrasound did not show any DVT but a questionable left axillary mass.  Subsequently a CT showed an anterior subluxation of the left shoulder.  Orthopedics was consulted.  They have recommended outpatient follow-up with MRI of his left shoulder  He also has an chronic indwelling Valentine catheter.  He was recently seen in the emergency room because of blood in his catheter he was diagnosed with hemorrhagic cystitis and given Levaquin.  He had a follow-up with urology which he chose not to attend.  He has had progressive weight gain of more than 40 pounds with lymphedema he admits he has significant caloric restriction prior to coming to the TCU and has been overeating with excessive caloric intake.  Mood and behaviors were reviewed and he has significant situational stressors with current issues relating to both his home being repossessed and significant alone issues and other psychosocial stressors currently his wife is also in the home he labeled a vulnerable adult and he is having difficulty  dealing with those aspects of his life   He has been seeing psychology has refused any anxiety medications.  He told me that his home has been foreclosed and condemned by the [facility]    Past Medical History     Active Ambulatory (Non-Hospital) Problems    Diagnosis     Left arm swelling     Fluid overload     Lymphedema     Allergic rhinitis     H/O noncompliance with medical treatment, presenting hazards to health     Failure to thrive in adult     Cognitive impairment     Weight gain     Stool incontinence     Insomnia     Discharge planning issues     Debilitated  patient     Urinary retention     Urinary obstruction     Urinary tract infection     Benign prostatic hyperplasia with lower urinary tract symptoms     Post-void dribbling     Rhinitis medicamentosa     Sepsis due to urinary tract infection (H)     Sepsis (H)     CAD (coronary artery disease)     Non-STEMI (non-ST elevated myocardial infarction) (H)     Tachycardia     Anxiety     Spinal stenosis     Hypertension     Benign Prostatic Hypertrophy     Myalgia And Myositis     Urinary Tract Infection     Feelings Of Urinary Urgency     Joint Pain, Localized In The Hip     Past Medical History:   Diagnosis Date     Arthritis      Benign prostatic hyperplasia with lower urinary tract symptoms 2/23/2017     BPH (benign prostatic hypertrophy)      Coronary artery disease      History of transfusion 1975     Hypertension      Pneumonia 1980     Post-void dribbling 2/23/2017     Retinal tear of right eye      Rhinitis medicamentosa 2/23/2017     Spinal stenosis      Spinal stenosis      UTI (urinary tract infection)        Past Social History     Reviewed, and he  reports that he quit smoking about 34 years ago. He smoked 0.00 packs per day. He has never used smokeless tobacco. He reports that he does not drink alcohol or use drugs.    Family History     Reviewed, and includes cataracts and CVD in his father; his mother had dementia; grandmother had DM    Medication List     Current Outpatient Medications on File Prior to Visit   Medication Sig Dispense Refill     acetaminophen (TYLENOL) 325 MG tablet Take 650 mg by mouth 4 (four) times a day.       acetaminophen (TYLENOL) 500 MG tablet Take 500-1,000 mg by mouth every 4 (four) hours as needed for pain. Do not exceed 4000 mg in 24 hours       cyclobenzaprine (FLEXERIL) 10 MG tablet Take 1 tablet (10 mg total) by mouth 2 (two) times a day as needed for muscle spasms. 10 tablet 0     diclofenac sodium (VOLTAREN) 1 % Gel Apply 1 g topically 3 (three) times a day. Apply to  bilateral knees/hips       diclofenac sodium (VOLTAREN) 1 % Gel Apply 1 g topically as needed. To hips/knees/ as needed       doxycycline (VIBRAMYCIN) 100 MG capsule Take 1 capsule (100 mg total) by mouth 2 (two) times a day for 7 days. 14 capsule 0     fluticasone propionate (FLONASE) 50 mcg/actuation nasal spray Apply 1 spray into each nostril every 8 (eight) hours as needed. For overuse of nasal decongestant.              furosemide (LASIX) 20 MG tablet Take 20 mg by mouth daily.       gabapentin (NEURONTIN) 100 MG capsule Take 100 mg by mouth 2 (two) times a day.       magnesium oxide (MAG-OX) 400 mg (241.3 mg magnesium) tablet Take 400 mg by mouth daily.       naproxen sodium (ALEVE) 220 MG tablet Take 220 mg by mouth 2 (two) times a day with meals.              phenylephrine (NICOLE-SYNEPHRINE) 1 % Spry 1-2 sprays into each nostril every 4 (four) hours as needed.              polyethylene glycol (MIRALAX) 17 gram packet Take 1 packet (17 g total) by mouth daily.  0     potassium chloride (K-DUR,KLOR-CON) 20 MEQ tablet Take 20 mEq by mouth daily.       sodium chloride (OCEAN) 0.65 % nasal spray 1 spray into each nostril as needed for congestion. Use with phenylephrine spray.       tamsulosin (FLOMAX) 0.4 mg cap Take 0.4 mg by mouth Daily after breakfast.              tiZANidine (ZANAFLEX) 4 MG tablet Take 4 mg by mouth twice a day with lunch and supper.       No current facility-administered medications on file prior to visit.        Allergies     Allergies   Allergen Reactions     Lisinopril Shortness Of Breath and Dizziness     Fosinopril Sodium Other (See Comments) and Dizziness     Mouth numbness     Amoxicillin-Pot Clavulanate Hives and Rash     Cephalosporins Hives and Rash     Penicillins Rash       Review of Systems   A comprehensive review of 14 systems was done. Pertinent findings noted here and in history of present illness. All the rest negative.  Constitutional: Negative.  Negative for fever, chills,  activity change, appetite change and fatigue  He is having significant weight gain of almost 40 pounds since admission.   HENT: Negative for congestion and facial swelling.    Eyes: Negative for photophobia, redness and visual disturbance.   Respiratory: Negative for cough and chest tightness.    Cardiovascular: Negative for chest pain, palpitations and leg swelling.   Gastrointestinal: Negative for nausea, diarrhea, constipation, blood in stool and abdominal distention.   Genitourinary: Negative.    Musculoskeletal: Negative.    Skin: Negative.  Hyperpigmented rescaling noted in both of his hands.  Patient told me it is from chronic handwashing  Neurological: Negative for dizziness, tremors, syncope, weakness, light-headedness and headaches.   Hematological: Does not bruise/bleed easily.   Psychiatric/Behavioral: Negative.  Patient is reporting insomnia.  Mood and behaviors have shown some dysregulation with outbursts reported by staff      Physical Exam     Recent Vitals 11/4/2019   Weight 219 lbs 3 oz   BSA (m2) 2.2 m2   /81   Pulse 94   Temp 98.1   Temp src -   SpO2 -   Some recent data might be hidden   Recheck weight now is 221 pounds    Constitutional: Oriented to person, place, and time and appears well-developed.   Looks very disabled and deconditioned  HEENT:  Normocephalic and atraumatic.  Eyes: Conjunctivae and EOM are normal. Pupils are equal, round, and reactive to light. No discharge.  No scleral icterus. Nose normal. Mouth/Throat: Oropharynx is clear and moist. No oropharyngeal exudate.    NECK: Normal range of motion. Neck supple. No JVD present. No tracheal deviation present. No thyromegaly present.   CARDIOVASCULAR: Normal rate, regular rhythm and intact distal pulses.  Exam reveals no gallop and no friction rub.  Systolic murmur present.  PULMONARY: Effort normal and breath sounds normal. No respiratory distress.No Wheezing or rales.  ABDOMEN: Soft. Bowel sounds are normal. No distension  and no mass.  There is no tenderness. There is no rebound and no guarding. No HSM.  Open area noted on his right buttock which appears to be chronic with hyperpigmented changes noted with irritation on his buttocks  MUSCULOSKELETAL: Normal range of motion. 1+ edema and no tenderness. Mild kyphosis, no tenderness.  Right lower extremity is swollen with profound arthritic changes noted with swelling in his right knee.  On standing leg is crooked and patient is not able to stand up straight.  Profound kyphoscoliosis of his back also noted and he could not straighten himself up he was in severe pain  Left upper extremity is painful with limited range of movement and has 2+ pitting edema  LYMPH NODES: Has no cervical, supraclavicular, axillary and groin adenopathy.   NEUROLOGICAL: Alert and oriented to person, place, and time. No cranial nerve deficit.  Normal muscle tone. Coordination normal.   GENITOURINARY: Deferred exam.  Valentine present  SKIN: Skin is warm and dry. No rash noted. No erythema. No pallor.   Hypopigmentary changes noted in his both fingers and hands which patient attributes to chronic handwashing  EXTREMITIES: No cyanosis, no clubbing,1+ edema. No Deformity.  PSYCHIATRIC: Normal mood, affect and behavior.  Has chronic anxiety      Lab Results       Lab Results   Component Value Date    ALBUMIN 4.0 02/15/2019    ALT 19 02/15/2019    AST 19 02/15/2019    BUN 39 (H) 11/05/2019    CALCIUM 8.9 11/05/2019     11/05/2019    CHOL 132 10/19/2014    CO2 25 11/05/2019    CREATININE 0.76 11/05/2019    GFRAA >60 11/05/2019    GFRNONAA >60 11/05/2019    GLU 90 11/05/2019    HCT 30.9 (L) 11/01/2019    WBC 8.7 11/01/2019    HGB 9.8 (L) 11/01/2019    MG 1.8 09/08/2019     11/01/2019    K 4.9 11/05/2019    PSA 4.5 02/15/2013     11/05/2019         Post Discharge Medication Reconciliation Status: discharge medications reconciled and changed, per note/orders (see AVS)      NICKOLAS Alvarado

## 2021-06-03 NOTE — TELEPHONE ENCOUNTER
[facility] Nurse Triage Telephone Note      Provider: KIMI Chamorro  Facility: [facility]    Facility Type: TCU    Caller: Dahlia  Call Back Number:  [phone]    Allergies: Lisinopril; Fosinopril sodium; Tramadol; Amoxicillin-pot clavulanate; Cephalosporins; and Penicillins    Reason for call: Nurse reporting BMP results.  BMP was drawn this AM prior to him going to the ER due to weight gain, shortness of breath, and edema.  While in the ER, patient received Lasix 80mg IV.  Patient has returned from the ER and is no longer short of breath.         Verbal Order/Direction given by Provider: Check BMP on 12/10/19.      Provider giving order: KIMI Chamorro    Verbal order given to: Luis Omalley RN

## 2021-06-03 NOTE — PROGRESS NOTES
[facility]      NAME:  Rashad Caputo             :  1950    MRN: [id number]    CODE STATUS:  FULL CODE    FACILITY: [facility] [[id number]]         CHIEF COMPLAIN/REASON FOR VISIT:  Chief Complaint   Patient presents with     Review Of Multiple Medical Conditions       HISTORY OF PRESENT ILLNESS: Rashad Caputo is a 69 y.o. male being seen for review of multiple medical conditions, asked per staff to see pt for increased arm pain. Left arm has increased edema. He needs an MRI but has not scheduled yet per his choice. Past work up negative for DVT. .  Per PMH: Patient presented to the hospital with acute urinary retention.  He had a Shen catheter placed and currently discharged with an indwelling Shen catheter.  He will follow with urology for a voiding trial. He was noted to have hematuria on , improved today. He developed a UTI cultures grew strep viridans.  He was given Levaquin 7 days post discharge.He has bowel issues of fecal incontinence, ongoing. Reports some pain to left knee, managed with analgesics.   He was to go to  for Folay removal today, but once again cancelled his apt. He was educated ( again) on risk benefits of prolonged catheter use. He reports to me that he promises to have it removed by next Tuesday.   He does has shen, is to see  on 19/ Lwft arm with increased edema, reports pain. Refuses offer of voltaren gel. Wt has been stable sibce 19. Appears edematous today, from upper arm to hand. New open area to his left arm, probable fluid blister opened. It id dry, suggest leave OA.    Allergies   Allergen Reactions     Lisinopril Shortness Of Breath and Dizziness     Fosinopril Sodium Other (See Comments) and Dizziness     Mouth numbness     Tramadol Other (See Comments)     sweating     Amoxicillin-Pot Clavulanate Hives and Rash     Cephalosporins Hives and Rash     Penicillins Rash   :     Current Outpatient Medications    Medication Sig     acetaminophen (TYLENOL) 325 MG tablet Take 650 mg by mouth 4 (four) times a day.     acetaminophen (TYLENOL) 500 MG tablet Take 500-1,000 mg by mouth every 4 (four) hours as needed for pain. Do not exceed 4000 mg in 24 hours     cyclobenzaprine (FLEXERIL) 10 MG tablet Take 1 tablet (10 mg total) by mouth 2 (two) times a day as needed for muscle spasms.     diclofenac sodium (VOLTAREN) 1 % Gel Apply 1 g topically 3 (three) times a day. Apply to bilateral knees/hips     diclofenac sodium (VOLTAREN) 1 % Gel Apply 1 g topically as needed. To hips/knees/ as needed     fluticasone propionate (FLONASE) 50 mcg/actuation nasal spray Apply 1 spray into each nostril every 8 (eight) hours as needed. For overuse of nasal decongestant.            furosemide (LASIX) 20 MG tablet Take 40 mg by mouth daily.            gabapentin (NEURONTIN) 100 MG capsule Take 200 mg by mouth 3 (three) times a day.     magnesium oxide (MAG-OX) 400 mg (241.3 mg magnesium) tablet Take 400 mg by mouth daily.     naproxen sodium (ALEVE) 220 MG tablet Take 220 mg by mouth 2 (two) times a day with meals.            phenylephrine (NICOLE-SYNEPHRINE) 1 % Spry 1-2 sprays into each nostril every 4 (four) hours as needed.            polyethylene glycol (MIRALAX) 17 gram packet Take 1 packet (17 g total) by mouth daily.     potassium chloride (K-DUR,KLOR-CON) 10 MEQ tablet Take 10 mEq by mouth daily.     QUEtiapine (SEROQUEL) 50 MG tablet Take 50 mg by mouth at bedtime.     sodium chloride (OCEAN) 0.65 % nasal spray 1 spray into each nostril as needed for congestion. Use with phenylephrine spray.     tamsulosin (FLOMAX) 0.4 mg cap Take 0.4 mg by mouth Daily after breakfast.                REVIEW OF SYSTEMS:    Currently, no fever, chills, or rigors. Does not have any visual or hearing problems. Denies any chest pain, headaches, palpitations, lightheadedness, dizziness, shortness of breath, or cough. Appetite is good. Denies any GERD symptoms.  Denies any difficulty with swallowing, nausea, or vomiting.  Denies any abdominal pain, was constipated now with loose stools d/t prune juice. Denies any urinary symptoms other then retention,. No insomnia. No active bleeding other then ongoing hematuria. No rash. Pain to left arm      PHYSICAL EXAMINATION:  Vitals:    11/27/19 0527   BP: 122/78   Pulse: 88   Temp: 97.8  F (36.6  C)   Weight: (!) 233 lb (105.7 kg)         GENERAL: Awake, Alert, oriented x3,unkempt in appearance,  not in any form of acute distress, answers questions appropriately, follows simple commands, conversant  HEENT: Head is normocephalic with normal hair distribution. No evidence of trauma. Ears: No acute purulent discharge. Eyes: Conjunctivae pink with no scleral jaundice. Nose: Normal mucosa and septum. NECK: Supple with no cervical or supraclavicular lymphadenopathy. Trachea is midline.   CHEST: No tenderness or deformity, no crepitus  LUNG: Clear to auscultation with good chest expansion. There are no crackles or wheezes, normal AP diameter.  BACK: No kyphosis of the thoracic spine. Symmetric, no curvature, ROM normal, no CVA tenderness, no spinal tenderness   CVS: There is good S1  S2,  rhythm is regular.  ABDOMEN: Globular and soft, nontender to palpation, non distended, no masses, no organomegaly, good bowel sounds, no rebound or guarding, no peritoneal signs.   EXTREMITIES: ROM UE WNL, left arm with increased pain and Swelling  SKIN: Warm and dry, no erythema noted, no rashes or lesions.  NEUROLOGICAL: The patient is oriented to person, place and time. Strength and sensation are grossly intact. Face is symmetric.          LABS:    Lab Results   Component Value Date    WBC 8.7 11/01/2019    HGB 9.8 (L) 11/01/2019    HCT 30.9 (L) 11/01/2019    MCV 83 11/01/2019     11/01/2019       Results for orders placed or performed in visit on 11/21/19   Basic Metabolic Panel   Result Value Ref Range    Sodium 138 136 - 145 mmol/L     Potassium 5.3 (H) 3.5 - 5.0 mmol/L    Chloride 105 98 - 107 mmol/L    CO2 24 22 - 31 mmol/L    Anion Gap, Calculation 9 5 - 18 mmol/L    Glucose 91 70 - 125 mg/dL    Calcium 9.3 8.5 - 10.5 mg/dL    BUN 43 (H) 8 - 22 mg/dL    Creatinine 0.95 0.70 - 1.30 mg/dL    GFR MDRD Af Amer >60 >60 mL/min/1.73m2    GFR MDRD Non Af Amer >60 >60 mL/min/1.73m2           No results found for: HGBA1C  No results found for: [id number]  No results found for: [id number]    ASSESSMENT/PLAN:  1. Urinary retention    2. Debilitated patient      1. Urinary retention: Rashad has had catheter in place since admission and often cancels his apt with . Will not allow nursing to try voiding trial. He reports to me he has f/u  apt 11/29/19    2. Debilitated pt: PT will be seeing pt x 3 weekly. He has had chronic medical conditions with ortho issues including chronic pain, left shoulder fx , back and knee abnormality.He is followed by ortho with no concrete plans for surgery.  He has ongoing servicse with physiatric, Dr Cisneros here weekly.  Spent time with Dr Cisneros andmyself this am and Rashad has agreed to try seroquel, but recently refused. Again agreed with Dr Cisneros to try for at least one week.        Electronically signed by:  Anuradha Barrett CNP  This progress note was completed using Dragon software and there may be grammatical errors.

## 2021-06-03 NOTE — PROGRESS NOTES
[facility]      NAME:  Rashad Caputo             :  1950    MRN: [id number]    CODE STATUS:  FULL CODE    FACILITY: [facility] [[id number]]         CHIEF COMPLAIN/REASON FOR VISIT:  Chief Complaint   Patient presents with     Problem Visit       HISTORY OF PRESENT ILLNESS: Rashad Caputo is a 69 y.o. male being seen per nursing request. Rashad was at hospital for a swollen arm last week and they would like me to review chart. . Seen today,in room , moaning in pain, reports his left arm is very painful. It is noted to be warm and swollen. Per PMH: Patient presented to the hospital with acute urinary retention.  He had a Valentine catheter placed and currently discharged with an indwelling Valentine catheter.  He will follow with urology for a voiding trial. He was noted to have hematuria on , improved today. He developed a UTI cultures grew strep viridans.  He was given Levaquin 7 days post discharge.He has bowel issues of fecal incontinence, ongoing. Reports some pain to left knee, managed with analgesics.   He was to go to  for Folay removal today, but once again cancelled his apt. He was educated ( again) on risk benefits of prolonged catheter use. He reports to me that he promises to have it removed by next Tuesday.   He does continue with therapies and SN on TCU for management of chronic disease status as well as assist in ADLS and bladder care. Sw has been searching for dc planning as pt will require AL. Rashad reports overall feeling of well being with pain managed. OT is now following for  lymph wraps.  F/U from acute care labs, he was put on Levaquin for a UTI when in acute care, note his results were resistive to levaquin.    Allergies   Allergen Reactions     Lisinopril Shortness Of Breath and Dizziness     Fosinopril Sodium Other (See Comments) and Dizziness     Mouth numbness     Amoxicillin-Pot Clavulanate Hives and Rash     Cephalosporins Hives and  Rash     Penicillins Rash   :     Current Outpatient Medications   Medication Sig     acetaminophen (TYLENOL) 325 MG tablet Take 650 mg by mouth 4 (four) times a day.     acetaminophen (TYLENOL) 500 MG tablet Take 500-1,000 mg by mouth every 4 (four) hours as needed for pain. Do not exceed 4000 mg in 24 hours     cyclobenzaprine (FLEXERIL) 10 MG tablet Take 1 tablet (10 mg total) by mouth 2 (two) times a day as needed for muscle spasms.     diclofenac sodium (VOLTAREN) 1 % Gel Apply 1 g topically 3 (three) times a day. Apply to bilateral knees/hips     diclofenac sodium (VOLTAREN) 1 % Gel Apply 1 g topically as needed. To hips/knees/ as needed     fluticasone propionate (FLONASE) 50 mcg/actuation nasal spray Apply 1 spray into each nostril every 8 (eight) hours as needed. For overuse of nasal decongestant.            furosemide (LASIX) 20 MG tablet Take 20 mg by mouth daily.     gabapentin (NEURONTIN) 100 MG capsule Take 100 mg by mouth 2 (two) times a day.     levoFLOXacin (LEVAQUIN) 750 MG tablet Take 1 tablet (750 mg total) by mouth daily for 3 days.     magnesium oxide (MAG-OX) 400 mg (241.3 mg magnesium) tablet Take 400 mg by mouth daily.     naproxen sodium (ALEVE) 220 MG tablet Take 220 mg by mouth 2 (two) times a day with meals.            phenylephrine (NICOLE-SYNEPHRINE) 1 % Spry 1-2 sprays into each nostril every 4 (four) hours as needed.            polyethylene glycol (MIRALAX) 17 gram packet Take 1 packet (17 g total) by mouth daily.     potassium chloride (K-DUR,KLOR-CON) 20 MEQ tablet Take 20 mEq by mouth daily.     sodium chloride (OCEAN) 0.65 % nasal spray 1 spray into each nostril as needed for congestion. Use with phenylephrine spray.     tamsulosin (FLOMAX) 0.4 mg cap Take 0.4 mg by mouth Daily after breakfast.            tiZANidine (ZANAFLEX) 4 MG tablet Take 4 mg by mouth twice a day with lunch and supper.         REVIEW OF SYSTEMS:    Currently, no fever, chills, or rigors. Does not have any  visual or hearing problems. Denies any chest pain, headaches, palpitations, lightheadedness, dizziness, shortness of breath, or cough. Appetite is good. Denies any GERD symptoms. Denies any difficulty with swallowing, nausea, or vomiting.  Denies any abdominal pain, was constipated now with loose stools d/t prune juice. Denies any urinary symptoms other then retention,. No insomnia. No active bleeding other then ongoing hematuria. No rash.       PHYSICAL EXAMINATION:  Vitals:    11/04/19 1709   BP: 104/81   Pulse: 94   Temp: 98.1  F (36.7  C)   Weight: 219 lb 3.2 oz (99.4 kg)         GENERAL: Awake, Alert, oriented x3,unkempt in appearance,  not in any form of acute distress, answers questions appropriately, follows simple commands, conversant  HEENT: Head is normocephalic with normal hair distribution. No evidence of trauma. Ears: No acute purulent discharge. Eyes: Conjunctivae pink with no scleral jaundice. Nose: Normal mucosa and septum. NECK: Supple with no cervical or supraclavicular lymphadenopathy. Trachea is midline.   CHEST: No tenderness or deformity, no crepitus  LUNG: Clear to auscultation with good chest expansion. There are no crackles or wheezes, normal AP diameter.  BACK: No kyphosis of the thoracic spine. Symmetric, no curvature, ROM normal, no CVA tenderness, no spinal tenderness   CVS: There is good S1  S2,  rhythm is regular.  ABDOMEN: Globular and soft, nontender to palpation, non distended, no masses, no organomegaly, good bowel sounds, no rebound or guarding, no peritoneal signs.   EXTREMITIES: ROM UE WNL, left arm with increased pain and Swelling  SKIN: Warm and dry, no erythema noted, no rashes or lesions.  NEUROLOGICAL: The patient is oriented to person, place and time. Strength and sensation are grossly intact. Face is symmetric.          LABS:    Lab Results   Component Value Date    WBC 8.7 11/01/2019    HGB 9.8 (L) 11/01/2019    HCT 30.9 (L) 11/01/2019    MCV 83 11/01/2019      11/01/2019       Results for orders placed or performed during the hospital encounter of 11/01/19   Basic Metabolic Panel   Result Value Ref Range    Sodium 136 136 - 145 mmol/L    Potassium 5.5 (H) 3.5 - 5.0 mmol/L    Chloride 104 98 - 107 mmol/L    CO2 24 22 - 31 mmol/L    Anion Gap, Calculation 8 5 - 18 mmol/L    Glucose 95 70 - 125 mg/dL    Calcium 9.2 8.5 - 10.5 mg/dL    BUN 28 (H) 8 - 22 mg/dL    Creatinine 0.77 0.70 - 1.30 mg/dL    GFR MDRD Af Amer >60 >60 mL/min/1.73m2    GFR MDRD Non Af Amer >60 >60 mL/min/1.73m2           No results found for: HGBA1C  No results found for: [id number]  No results found for: NBBVSOPF56    ASSESSMENT/PLAN:  1. Left arm swelling    2. Acute cystitis without hematuria      1. Painful left arm swelling, Reports pain improving, reports from ED was possible fx and subluxation of hs left shoulder, no orders. I did request [facility] obtain hospital note and we set up a f/u visit to ortho. Also questioning OT if a sling would be beneficial for support to arm. Rashad does have momment to arm.    2.Hematuria: In fitzgerald of UTI being treated with levaquin which sensitivity is resistive,we will start him on Bactim DS 1 po two times a day X 7DAYS.                Electronically signed by:  Anuradha Barrett CNP  This progress note was completed using Dragon software and there may be grammatical errors.

## 2021-06-03 NOTE — PROGRESS NOTES
[facility]      NAME:  Rashad Caputo             :  1950    MRN: [id number]    CODE STATUS:  FULL CODE    FACILITY: [facility] [[id number]]         CHIEF COMPLAIN/REASON FOR VISIT:  Chief Complaint   Patient presents with     Problem Visit       HISTORY OF PRESENT ILLNESS: Rashad Caputo is a 69 y.o. male being seen for review of multiple medical conditions, asked per staff to see pt for increased arm pain. Left arm has increased edema. He needs an MRI but has not scheduled yet per his choice. Past work up negative for DVT. .  Per PMH: Patient presented to the hospital with acute urinary retention.  He had a Shen catheter placed and currently discharged with an indwelling Shen catheter.  He will follow with urology for a voiding trial. He was noted to have hematuria on , improved today. He developed a UTI cultures grew strep viridans.  He was given Levaquin 7 days post discharge.He has bowel issues of fecal incontinence, ongoing. Reports some pain to left knee, managed with analgesics.   He was to go to  for Folay removal today, but once again cancelled his apt. He was educated ( again) on risk benefits of prolonged catheter use. He reports to me that he promises to have it removed by next Tuesday.   He does has shen, is to see  on 19/ Lwft arm with increased edema, reports pain. Refuses offer of voltaren gel. Wt has been stable sibce 19. Appears edematous today, from upper arm to hand. New open area to his left arm, probable fluid blister opened. It id dry, suggest leave OA.    Allergies   Allergen Reactions     Lisinopril Shortness Of Breath and Dizziness     Fosinopril Sodium Other (See Comments) and Dizziness     Mouth numbness     Amoxicillin-Pot Clavulanate Hives and Rash     Cephalosporins Hives and Rash     Penicillins Rash   :     Current Outpatient Medications   Medication Sig     acetaminophen (TYLENOL) 325 MG tablet Take 650 mg  by mouth 4 (four) times a day.     acetaminophen (TYLENOL) 500 MG tablet Take 500-1,000 mg by mouth every 4 (four) hours as needed for pain. Do not exceed 4000 mg in 24 hours     cyclobenzaprine (FLEXERIL) 10 MG tablet Take 1 tablet (10 mg total) by mouth 2 (two) times a day as needed for muscle spasms.     diclofenac sodium (VOLTAREN) 1 % Gel Apply 1 g topically 3 (three) times a day. Apply to bilateral knees/hips     diclofenac sodium (VOLTAREN) 1 % Gel Apply 1 g topically as needed. To hips/knees/ as needed     fluticasone propionate (FLONASE) 50 mcg/actuation nasal spray Apply 1 spray into each nostril every 8 (eight) hours as needed. For overuse of nasal decongestant.            furosemide (LASIX) 20 MG tablet Take 20 mg by mouth daily.     gabapentin (NEURONTIN) 100 MG capsule Take 100 mg by mouth 2 (two) times a day.     magnesium oxide (MAG-OX) 400 mg (241.3 mg magnesium) tablet Take 400 mg by mouth daily.     naproxen sodium (ALEVE) 220 MG tablet Take 220 mg by mouth 2 (two) times a day with meals.            phenylephrine (NICOLE-SYNEPHRINE) 1 % Spry 1-2 sprays into each nostril every 4 (four) hours as needed.            polyethylene glycol (MIRALAX) 17 gram packet Take 1 packet (17 g total) by mouth daily.     potassium chloride (K-DUR,KLOR-CON) 20 MEQ tablet Take 20 mEq by mouth daily.     sodium chloride (OCEAN) 0.65 % nasal spray 1 spray into each nostril as needed for congestion. Use with phenylephrine spray.     tamsulosin (FLOMAX) 0.4 mg cap Take 0.4 mg by mouth Daily after breakfast.            tiZANidine (ZANAFLEX) 4 MG tablet Take 4 mg by mouth twice a day with lunch and supper.         REVIEW OF SYSTEMS:    Currently, no fever, chills, or rigors. Does not have any visual or hearing problems. Denies any chest pain, headaches, palpitations, lightheadedness, dizziness, shortness of breath, or cough. Appetite is good. Denies any GERD symptoms. Denies any difficulty with swallowing, nausea, or vomiting.   Denies any abdominal pain, was constipated now with loose stools d/t prune juice. Denies any urinary symptoms other then retention,. No insomnia. No active bleeding other then ongoing hematuria. No rash. Pain to left arm      PHYSICAL EXAMINATION:  Vitals:    11/19/19 0502   BP: 121/81   Pulse: 85   Temp: 98.6  F (37  C)   Weight: (!) 223 lb 6.4 oz (101.3 kg)         GENERAL: Awake, Alert, oriented x3,unkempt in appearance,  not in any form of acute distress, answers questions appropriately, follows simple commands, conversant  HEENT: Head is normocephalic with normal hair distribution. No evidence of trauma. Ears: No acute purulent discharge. Eyes: Conjunctivae pink with no scleral jaundice. Nose: Normal mucosa and septum. NECK: Supple with no cervical or supraclavicular lymphadenopathy. Trachea is midline.   CHEST: No tenderness or deformity, no crepitus  LUNG: Clear to auscultation with good chest expansion. There are no crackles or wheezes, normal AP diameter.  BACK: No kyphosis of the thoracic spine. Symmetric, no curvature, ROM normal, no CVA tenderness, no spinal tenderness   CVS: There is good S1  S2,  rhythm is regular.  ABDOMEN: Globular and soft, nontender to palpation, non distended, no masses, no organomegaly, good bowel sounds, no rebound or guarding, no peritoneal signs.   EXTREMITIES: ROM UE WNL, left arm with increased pain and Swelling  SKIN: Warm and dry, no erythema noted, no rashes or lesions.  NEUROLOGICAL: The patient is oriented to person, place and time. Strength and sensation are grossly intact. Face is symmetric.          LABS:    Lab Results   Component Value Date    WBC 8.7 11/01/2019    HGB 9.8 (L) 11/01/2019    HCT 30.9 (L) 11/01/2019    MCV 83 11/01/2019     11/01/2019       Results for orders placed or performed in visit on 11/05/19   Basic Metabolic Panel   Result Value Ref Range    Sodium 136 136 - 145 mmol/L    Potassium 4.9 3.5 - 5.0 mmol/L    Chloride 106 98 - 107 mmol/L     CO2 25 22 - 31 mmol/L    Anion Gap, Calculation 5 5 - 18 mmol/L    Glucose 90 70 - 125 mg/dL    Calcium 8.9 8.5 - 10.5 mg/dL    BUN 39 (H) 8 - 22 mg/dL    Creatinine 0.76 0.70 - 1.30 mg/dL    GFR MDRD Af Amer >60 >60 mL/min/1.73m2    GFR MDRD Non Af Amer >60 >60 mL/min/1.73m2           No results found for: HGBA1C  No results found for: [id number]  No results found for: PNANWSIA10    ASSESSMENT/PLAN:  1. Left arm swelling    2. Pain      1. Left arm swelling and pain: Past  reports from ED was possible fx and subluxation of hs left shoulder, he needs f/u MRI but has not agreed to go. I feel some pain may be related to his increased edema.   Give lasix extra 40 mg po today and increase his daily dose from 30 to 40 daily. BMP f/u this week.              Electronically signed by:  Anuradha Barrett CNP  This progress note was completed using Dragon software and there may be grammatical errors.

## 2021-06-03 NOTE — PROGRESS NOTES
[facility]      NAME:  Rashad Caputo             :  1950    MRN: [id number]    CODE STATUS:  FULL CODE    FACILITY: [facility] [[id number]]         CHIEF COMPLAIN/REASON FOR VISIT:  Chief Complaint   Patient presents with     Body Laceration     Problem Visit       HISTORY OF PRESENT ILLNESS: Rashad Caputo is a 69 y.o. male being seen for review of multiple medical conditions,. Today wt is up to 251.6 lb which is up 30 pounds x 1 month. I increasied  lasix to 40 mg po two times a day from 40. He has a shen in place, dark sabrina with sedemit.  Per PMH: Patient presented to the hospital with acute urinary retention.  He had a Shen catheter placed and currently discharged with an indwelling Shen catheter.  He will follow with urology for a voiding trial. He was noted to have hematuria on , improved today. He developed a UTI cultures grew strep viridans.   Appears edematous today, wt is up, increased his lasix was increased and still with wt increase, We are sending to ED for increased diuressing and work up.    Allergies   Allergen Reactions     Lisinopril Shortness Of Breath and Dizziness     Fosinopril Sodium Other (See Comments) and Dizziness     Mouth numbness     Tramadol Other (See Comments)     sweating     Amoxicillin-Pot Clavulanate Hives and Rash     Cephalosporins Hives and Rash     Penicillins Rash   :     Current Outpatient Medications   Medication Sig     acetaminophen (TYLENOL) 325 MG tablet Take 650 mg by mouth every 6 (six) hours as needed.      cyclobenzaprine (FLEXERIL) 10 MG tablet Take 1 tablet (10 mg total) by mouth 2 (two) times a day as needed for muscle spasms.     diclofenac sodium (VOLTAREN) 1 % Gel Apply 1 g topically 3 (three) times a day. Apply to bilateral knees/hips     fluticasone propionate (FLONASE) 50 mcg/actuation nasal spray Apply 1 spray into each nostril every 8 (eight) hours as needed. For overuse of nasal  decongestant.            furosemide (LASIX) 20 MG tablet Take 40 mg by mouth 2 (two) times a day at 9am and 6pm.      furosemide (LASIX) 40 MG tablet Take 80 mg by mouth in the morning and 40 mg by mouth at night for 1 week.     gabapentin (NEURONTIN) 100 MG capsule Take 200 mg by mouth 3 (three) times a day.     magnesium oxide (MAG-OX) 400 mg (241.3 mg magnesium) tablet Take 400 mg by mouth daily.     naproxen sodium (ALEVE) 220 MG tablet Take 220 mg by mouth 2 (two) times a day with meals.            phenylephrine (NICOLE-SYNEPHRINE) 1 % Spry 1-2 sprays into each nostril every 4 (four) hours as needed.            polyethylene glycol (MIRALAX) 17 gram packet Take 1 packet (17 g total) by mouth daily.     QUEtiapine (SEROQUEL) 50 MG tablet Take 50 mg by mouth at bedtime.     sodium chloride (OCEAN) 0.65 % nasal spray 1 spray into each nostril as needed for congestion. Use with phenylephrine spray.     tamsulosin (FLOMAX) 0.4 mg cap Take 0.4 mg by mouth Daily after breakfast.                REVIEW OF SYSTEMS:    Currently, no fever, chills, or rigors. Does not have any visual or hearing problems. Denies any chest pain, headaches, palpitations, lightheadedness, dizziness, shortness of breath, or cough. Appetite is good. Denies any GERD symptoms. Denies any difficulty with swallowing, nausea, or vomiting.  Denies any abdominal pain, was constipated now with loose stools d/t prune juice. Denies any urinary symptoms other then retention,. No insomnia. No active bleeding . No rash. Pain to left arm      PHYSICAL EXAMINATION:  Vitals:    12/04/19 0524   BP: 151/84   Pulse: 78   Temp: (!) 96.1  F (35.6  C)   Weight: (!) 251 lb 9.6 oz (114.1 kg)         GENERAL: Awake, Alert, oriented x3,unkempt in appearance,  not in any form of acute distress, answers questions appropriately, follows simple commands, conversant  HEENT: Head is normocephalic with normal hair distribution. No evidence of trauma. Ears: No acute purulent  discharge. Eyes: Conjunctivae pink with no scleral jaundice. Nose: Normal mucosa and septum. NECK: Supple with no cervical or supraclavicular lymphadenopathy. Trachea is midline.   CHEST: No tenderness or deformity, no crepitus  LUNG: Clear to auscultation with good chest expansion. There are no crackles or wheezes, normal AP diameter.  BACK: No kyphosis of the thoracic spine. Symmetric, no curvature, ROM normal, no CVA tenderness, no spinal tenderness   CVS: There is good S1  S2,  rhythm is regular.  ABDOMEN: Globular and soft, nontender to palpation, non distended, no masses, no organomegaly, good bowel sounds, no rebound or guarding, no peritoneal signs.   EXTREMITIES: ROM UE WNL, left arm with increasedSwelling, lymphedema to legs  SKIN: Warm and dry, no erythema noted, no rashes or lesions.  NEUROLOGICAL: The patient is oriented to person, place and time. Strength and sensation are grossly intact. Face is symmetric.          LABS:    Lab Results   Component Value Date    WBC 5.6 12/03/2019    HGB 8.9 (L) 12/03/2019    HCT 28.1 (L) 12/03/2019    MCV 81 12/03/2019     12/03/2019       Results for orders placed or performed in visit on 12/03/19   Basic Metabolic Panel   Result Value Ref Range    Sodium 140 136 - 145 mmol/L    Potassium 4.4 3.5 - 5.0 mmol/L    Chloride 104 98 - 107 mmol/L    CO2 28 22 - 31 mmol/L    Anion Gap, Calculation 8 5 - 18 mmol/L    Glucose 95 70 - 125 mg/dL    Calcium 9.3 8.5 - 10.5 mg/dL    BUN 39 (H) 8 - 22 mg/dL    Creatinine 0.78 0.70 - 1.30 mg/dL    GFR MDRD Af Amer >60 >60 mL/min/1.73m2    GFR MDRD Non Af Amer >60 >60 mL/min/1.73m2           No results found for: HGBA1C  No results found for: [id number]  No results found for: BWEIUBWG62    ASSESSMENT/PLAN:  1. Other hypervolemia    2. Weight gain      1. Continues with fluid overload and wt gain. We recently doubled his lasix and wt up 2 pounds overnight. Gradual trend up with rapid wt increase 30 pounds in one month. In  liue of increased edema, more shortness of breath and weakness, send to ED for eval r/t rapid wt onset.    Electronically signed by:  Anuradha Barrett CNP  This progress note was completed using Dragon software and there may be grammatical errors.

## 2021-06-03 NOTE — TELEPHONE ENCOUNTER
[facility] Nurse Triage Telephone Note      Provider: Juana Le MD  Facility: [facility]    Facility Type: TCU    Caller: Nathalie  Call Back Number:  [phone]    Allergies: Lisinopril; Fosinopril sodium; Amoxicillin-pot clavulanate; Cephalosporins; and Penicillins    Reason for call: K+ 5.3 on K+10mEq daily & Lasix 40mg daily.  Bun 43 (39)     Verbal Order/Direction given by Provider: DC K+. Check K+ on 11/25.    Provider giving order: Juana Le MD    Verbal order given to: Nathalie Burns RN

## 2021-06-04 VITALS
DIASTOLIC BLOOD PRESSURE: 71 MMHG | WEIGHT: 213 LBS | HEART RATE: 90 BPM | SYSTOLIC BLOOD PRESSURE: 112 MMHG | TEMPERATURE: 97.1 F | BODY MASS INDEX: 31.45 KG/M2

## 2021-06-04 VITALS
BODY MASS INDEX: 31.01 KG/M2 | SYSTOLIC BLOOD PRESSURE: 151 MMHG | WEIGHT: 210 LBS | DIASTOLIC BLOOD PRESSURE: 81 MMHG | TEMPERATURE: 97.4 F | HEART RATE: 67 BPM

## 2021-06-04 VITALS
RESPIRATION RATE: 20 BRPM | OXYGEN SATURATION: 98 % | WEIGHT: 228 LBS | HEART RATE: 72 BPM | TEMPERATURE: 96 F | SYSTOLIC BLOOD PRESSURE: 132 MMHG | DIASTOLIC BLOOD PRESSURE: 90 MMHG | BODY MASS INDEX: 33.67 KG/M2

## 2021-06-04 VITALS
BODY MASS INDEX: 31.54 KG/M2 | SYSTOLIC BLOOD PRESSURE: 153 MMHG | HEART RATE: 76 BPM | TEMPERATURE: 96.5 F | DIASTOLIC BLOOD PRESSURE: 89 MMHG | WEIGHT: 213.6 LBS

## 2021-06-04 VITALS
RESPIRATION RATE: 16 BRPM | SYSTOLIC BLOOD PRESSURE: 108 MMHG | DIASTOLIC BLOOD PRESSURE: 70 MMHG | HEART RATE: 84 BPM | WEIGHT: 208 LBS | BODY MASS INDEX: 30.81 KG/M2 | HEIGHT: 69 IN

## 2021-06-04 VITALS
BODY MASS INDEX: 29.51 KG/M2 | HEART RATE: 75 BPM | WEIGHT: 199.8 LBS | TEMPERATURE: 97 F | SYSTOLIC BLOOD PRESSURE: 130 MMHG | DIASTOLIC BLOOD PRESSURE: 79 MMHG

## 2021-06-04 VITALS
HEART RATE: 78 BPM | DIASTOLIC BLOOD PRESSURE: 84 MMHG | WEIGHT: 251.6 LBS | BODY MASS INDEX: 37.15 KG/M2 | SYSTOLIC BLOOD PRESSURE: 151 MMHG | TEMPERATURE: 96.1 F

## 2021-06-04 VITALS
BODY MASS INDEX: 34.41 KG/M2 | SYSTOLIC BLOOD PRESSURE: 122 MMHG | WEIGHT: 233 LBS | TEMPERATURE: 97.8 F | HEART RATE: 88 BPM | DIASTOLIC BLOOD PRESSURE: 78 MMHG

## 2021-06-04 VITALS
BODY MASS INDEX: 32.99 KG/M2 | TEMPERATURE: 98.6 F | WEIGHT: 223.4 LBS | HEART RATE: 85 BPM | DIASTOLIC BLOOD PRESSURE: 81 MMHG | SYSTOLIC BLOOD PRESSURE: 121 MMHG

## 2021-06-04 VITALS
TEMPERATURE: 96.9 F | WEIGHT: 197 LBS | HEART RATE: 69 BPM | DIASTOLIC BLOOD PRESSURE: 63 MMHG | SYSTOLIC BLOOD PRESSURE: 95 MMHG | BODY MASS INDEX: 29.09 KG/M2

## 2021-06-04 VITALS
SYSTOLIC BLOOD PRESSURE: 123 MMHG | TEMPERATURE: 96.6 F | WEIGHT: 249.7 LBS | DIASTOLIC BLOOD PRESSURE: 74 MMHG | BODY MASS INDEX: 36.87 KG/M2 | HEART RATE: 82 BPM

## 2021-06-04 VITALS
DIASTOLIC BLOOD PRESSURE: 73 MMHG | TEMPERATURE: 97 F | WEIGHT: 217.4 LBS | BODY MASS INDEX: 32.1 KG/M2 | SYSTOLIC BLOOD PRESSURE: 124 MMHG | HEART RATE: 94 BPM

## 2021-06-04 VITALS
SYSTOLIC BLOOD PRESSURE: 137 MMHG | DIASTOLIC BLOOD PRESSURE: 80 MMHG | TEMPERATURE: 96.8 F | HEART RATE: 82 BPM | WEIGHT: 211.2 LBS | BODY MASS INDEX: 31.19 KG/M2

## 2021-06-04 VITALS
WEIGHT: 252.2 LBS | BODY MASS INDEX: 37.24 KG/M2 | HEART RATE: 80 BPM | SYSTOLIC BLOOD PRESSURE: 135 MMHG | DIASTOLIC BLOOD PRESSURE: 84 MMHG

## 2021-06-04 NOTE — PATIENT INSTRUCTIONS - HE
Rashad Caputo,    It was a pleasure to see you today at [facility].     My recommendations after this visit include:  - Please follow up with Dr Lazo in 6-8 weeks   - Please follow up with Sofiya Fontanez in April  - I have increased your bumex to 2 mg daily for 3 days then continue 1 mg daily after 3 days    Sofiya Fontanez CNP      What is the [facility] Failure Program?     The [facility] Failure Program is a heart failure specialty clinic within [facility].  You will work with your cardiologist, nurse practitioner, and nurses to carefully adjust medications and learn how to live with heart failure.  The Heart Failure Program will help you:      Better understand your chronic heart condition    Feel better and avoid hospital stays    Monitoring for Symptoms      Call the Heart Failure Phone Line ([phone]) if you have any of these symptoms:     Increased shortness of breath/shortness of breath at rest    Waking up at night with difficulty breathing    Unable to lie down for sleep due to symptoms or needing to sit upright for sleep    Weight gain of 2 pounds a day for 2 days in a row OR 5 pounds in 1 week    Increased swelling in your ankles or legs    Dizziness or lightheadedness    Medications       Take your medications as prescribed    Bring all your medications in their original bottles to every appointment    Avoid non-steroidal anti-inflammatory medications (Advil, Aleve, Ibuprofen, Naprosyn, Naproxen, Celebrex)    Do not stop taking your medications or begin taking over-the-counter or herbal medications without first talking to your doctor or nurse practitioner    Diet and Lifestyle       Limit sodium/salt to 2000 mg daily   o Read food labels for sodium content  o Do not add salt when cooking or add salt at the table    Weigh yourself every day and record in your daily weight log   o Call if you gain 2 pounds a day for 2 days in a row OR 5 pounds in 1  week  o Bring daily weight log to every appointment    Stay active, pace yourself, listen to your body, and rest when tired    Elevate your legs if they are swollen. Ask about using compression/support stockings    Stop smoking    Lose weight if you are overweight    Avoid drinking alcohol or limit amount    Stay updated on your immunizations including flu and pneumonia vaccines

## 2021-06-04 NOTE — PROGRESS NOTES
"Code Status:  FULL CODE  Visit Type: Problem Visit     Facility:  [facility] [[id number]]       Facility Type: SNF (Skilled Nursing Facility, TCU)    History of Present Illness: Rashad Caputo is a 69 y.o. male with medical history of rhinitis medicamentosa, hypertension, coronary artery disease, BPH with urinary retention and chronic indwelling urinary catheter, chronic left shoulder dislocation, and chronic right knee edema being seen today in follow-up.    Was evaluated on 11/22 at [facility] for worsening left shoulder pain and swelling and again on 12/3 at [facility] for weight gain/edema.  On 11/22, MRI of the left shoulder did show anterior dislocation of the humeral head as well as a chronic Hill-Sachs lesion of the humeral head and likely full-thickness tears of the supraspinatus, infraspinatus, and subscapularis muscles.  He has a glenohumeral joint effusion and significant subacromial bursitis.  This reportedly is needing to be managed primarily outpatient with [facility], and he needs to have left shoulder surgery.  He tells me today that after seeing [facility] in the clinic, they want \"to wait\" to do the surgery.    He has been gaining weight recently, as much as 25 pounds in 1 week.  Thinking this is due to eating double portions daily while at the TCU.  Complex social situation was reviewed with the patient's bedside RN and Dr. Le.  He notes that getting up out of his wheelchair is quite difficult due to pain and limited range of motion in the left shoulder.  He also has significant knee pain on the right.  He also has quite a bit of swelling in both legs, which makes it difficult for him to ambulate.  He gets tired and fatigued very easily as well.  He otherwise denies difficulty breathing.  He denies any chest pain.  He is frustrated by the lack of progress in getting the fluid off his body.  He notes that he was formally a  and had a " cartilage injury to his right knee, but that the knee has been getting worse for several years.  Denies any recent injury that he is aware of the left shoulder or the right knee.    Mood and behaviors are reviewed and he is voicing some depression issues but has been resistant to medication he reports is related to his situational stressors that he is experiencing.    He has a history of BPH and has an indwelling Valentine catheter will cancel frequently urology's appointments and does not want to try a voiding trial in the TCU    Review of Systems - General ROS: No fevers or chills.  Psychological ROS: No difficulty mentating.  Respiratory ROS: No shortness of breath, no cough.  Cardiovascular ROS: No chest pain.  Gastrointestinal ROS: No significant abdominal pain.  No nausea.  Good appetite.  Musculoskeletal ROS: + Left shoulder pain, + right knee pain.  Neurological ROS: No numbness or tingling in the extremities.    Physical Exam:   Weight is 261 pounds.  Blood pressure 111/56 temp 98 pulse 82  Constitutional: Appears grossly edematous, appears disheveled, appears uncomfortable.  HENT: Nose is normal.  EOMI.  No scleral icterus appreciated.   Cardiovascular: Normal rate, regular rhythm and normal heart sounds. No murmur appreciated.  Pulmonary/Chest: Lung sounds are clear and vesicular bilaterally in the posterior thorax.  No increased work of breathing.  There are no crackles, rhonchi or wheezes.  Musculoskeletal: Left shoulder appears dislocated anteriorly.  Head of the humerus is visible and there is pain to palpation around the shoulder joint.  There is also some ecchymosis and significant, 2+ pitting edema extending throughout the left arm into the fingertips.  The right knee is also grossly edematous, but not erythematous.  It is not warm to the touch.  Patient has pain and limited range of motion in the right knee.  Neurological: Alert.  No focal deficits appreciated.  Radial pulse is 2+ on the left.  Skin:  2+ pitting edema in the left arm throughout.  2+ pitting edema in the bilateral lower extremities, with significant edema in the right knee, as noted above.  Psychiatric: Normal mood and affect.  Appears to answer questions appropriately.    Labs: No new labs were obtained today.    Assessment/Plan:    #Chronic left shoulder dislocation  -This is quite a difficult case.  69-year-old male, vulnerable adult, has been at [facility] now for quite some time.  This is my first time meeting him today, but he has significant pathology in his left shoulder and has almost no range of motion in that left shoulder.  Per imaging noted above, his rotator cuff is essentially completely torn and his biceps tendon is also torn on the left.  He also has significant osteoarthritis, recurrent anterior dislocation, chronic Hill-Sachs deformity in the humerus, and significant bursitis.  He needs left shoulder surgery to regain any sort of meaningful movement in his left arm/shoulder.  -Sounds like getting him to follow-up consistently with orthopedics has been difficult.  Would recommend surgical consultation and proceeding with surgery as soon as able for the left shoulder, as I worry that this could get worse if he does not do anything now.  -Continue ANALY hose compression to the left arm to help with edema.  Palpable radial pulse on the left, so no current vascular compromise.  Sensation remains intact as well.  -Continue PT/OT work, but he needs definitive surgical management.    #Peripheral edema  -Patient has 2+ pitting edema in the bilateral lower extremities that seems to be getting worse over the last several weeks.  No significant shortness of breath, however.  Last echocardiogram was in 2014, from what I can see, showing a small circumferential pericardial effusion without evidence for tamponade.  LVEF was 65% at that time, with mild diastolic dysfunction.  I would recommend repeat echocardiogram and checking a BNP to see if there  could be a significant component of CHF contributing to his fluid buildup.  -Continue Lasix 80 mg daily for now.  Consider transition to Bumex 2 mg twice daily if no response to Lasix.  -Echocardiogram when able, pending work-up of other issues as above.  -Last knee x-ray I can see in care everywhere was from 2015 at [facility]; I assume he has had one at [facility], but I cannot see these results right now.  X-ray from 2015 shows marked narrowing of the lateral compartment of the right knee with bone-on-bone appearance.  There is also mild to moderate tricompartmental degenerative spurring of the right knee.  Presume that this is worsened significantly since 2015.  Right knee is not warm and does not appear erythematous, so I doubt this is gout.  No fevers or chills or systemic signs of infection, so I doubt septic arthritis as well.  Would recommend repeat imaging of the right knee if not done previously and total knee arthroscopy if he can be cleared medically.        Patient was discussed and evaluated with Dr. Le.  Geovanni Lanier, PGY-3  [facility]  Pager:  [phone]    Patient was examined independently and in the presence of the resident.  Care plan was reviewed in detail.  Unfortunately he is a poor surgical candidate and outcomes will not be good as the nature of his orthopedic injuries are chronic and with poor motivation and poor functional status outcomes are likely to be poor with poor rehab potential noted.  His best bet is to move to long-term care which she is resistant to.  Advised him to think about it monitor his oral intake  NICKOLAS Alvarado

## 2021-06-04 NOTE — PROGRESS NOTES
"  [facility]      NAME:  Rashad Caputo             :  1950    MRN: [id number]    CODE STATUS:  FULL CODE    FACILITY: [facility] [[id number]]         CHIEF COMPLAIN/REASON FOR VISIT:  Chief Complaint   Patient presents with     Review Of Multiple Medical Conditions       HISTORY OF PRESENT ILLNESS: Rashad Caputo is a 69 y.o. male being seen for review of multiple medical condition. He was on the TCU and sent to acute care due to gradual wt increase of 60 pounds or so. We had sent him in several times for lymphedema, but he was always sent back. But on day of dc from TCU, .Nurses reporedt that Rashad felt he was having a stroke and he couldn't speak other than garbled words. B/P  Pulse reviewed, stable. Speaking articulatley this am. He was up until 5 am and is sleepy, I suspect this is related to his speech change and fatigue. .I had ordered d dimer and it was elevated and we sent him in. He was finally admitted.He started out at [facility] then sent to [facility], in from  to 2019. Per his dc summary from hospital \" presenting with hypothermia, sepsis secondary to probable catheter associated urinary tract infection, dysarthria, acute diastolic heart failure     Septic shock: Etiology presumed secondary to catheter associated urinary tract infection with urine culture MSSA.  Blood cultures x3 no growth to date.  Required ICU admission and initial vasopressor and broad-spectrum IV antibiotic support.  Current septic shock resolved.     Catheter associated UTI with sepsis: Urine culture MSSA.  Patient refused to have Valentine catheter exchanged for new one as he would like to follow-up as outpatient with his urologist.  I have placed an order for referral for [facility] for him to be seen in the clinic within 7 days time to have catheter exchanged out for new one.  Counseled patient on the importance of exchanging catheter and he acknowledged understanding " "was able to recite potential complications of not following recommendations of exchanging catheter out for a clean 1.  He still refused.  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 We reviewed meds, diet recommendations and ongoing health concerns with ac. He is reported to have cancelled last  apt. We discussed healthy eating habits wt at 199 today.    Allergies   Allergen Reactions     Lisinopril Shortness Of Breath and Dizziness     Amoxicillin      Allergy is per  [email or web address] records.     Fosinopril Sodium Other (See Comments) and Dizziness     Mouth numbness     Tramadol Other (See Comments)     sweating     Amoxicillin-Pot Clavulanate Hives and Rash     Cephalosporins Hives and Rash     Penicillins Rash   :     Current Outpatient Medications   Medication Sig     acetaminophen (TYLENOL) 500 MG tablet Take 1 tablet (500 mg total) by mouth every 4 (four) hours as needed for pain. Do not exceed 4000 mg in 24 hours.     aspirin 81 MG EC tablet Take 1 tablet (81 mg total) by mouth daily.     bumetanide (BUMEX) 1 MG tablet Take 1 tablet (1 mg total) by mouth daily.     carvedilol (COREG) 6.25 MG tablet Take 1 tablet (6.25 mg total) by mouth 2 (two) times a day.     cyclobenzaprine (FLEXERIL) 10 MG tablet Take 1 tablet (10 mg total) by mouth every 12 (twelve) hours as needed for muscle spasms.     diclofenac sodium (VOLTAREN) 1 % Gel Apply topically every 8 (eight) hours as needed (pain). Apply to arm/knee.     fluticasone propionate (FLONASE) 50 mcg/actuation nasal spray Apply 1 spray into each nostril 2 (two) times a day.     gabapentin (NEURONTIN) 100 MG capsule Take 200 mg by mouth 3 (three) times a day.     hydrocortisone (CORTEF) 5 MG tablet Take 1 tablet (5 mg total) by mouth 2 (two) times a day. Do not stop this medicine " unless MD discontinues due to possible Adrenal Insufficiency Diagnosis     magnesium oxide (MAG-OX) 400 mg (241.3 mg magnesium) tablet Take 400 mg by mouth 2 (two) times a day.      polyethylene glycol (MIRALAX) 17 gram packet Take 1 packet (17 g total) by mouth daily.     QUEtiapine (SEROQUEL) 50 MG tablet Take 50 mg by mouth at bedtime.     senna-docusate (PERICOLACE) 8.6-50 mg tablet Take 1 tablet by mouth 2 (two) times a day.     sodium chloride (OCEAN) 0.65 % nasal spray Apply 1 spray into each nostril every 4 (four) hours as needed for congestion. Use with phenylephrine spray.      tamsulosin (FLOMAX) 0.4 mg cap Take 0.4 mg by mouth Daily after breakfast.            thiamine 100 MG tablet Take 1 tablet (100 mg total) by mouth daily.         REVIEW OF SYSTEMS:    Currently, no fever, chills, or rigors. Does not have any visual or hearing problems. Denies any chest pain, headaches, palpitations, lightheadedness, dizziness, shortness of breath, or cough. Appetite is good. Denies any GERD symptoms. Denies any difficulty with swallowing, nausea, or vomiting.  Denies any abdominal pain,  with loose stools d/t prune juice. Denies any urinary symptoms other then retention,. No insomnia. No active bleeding . No rash. Pain to left arm has been stable      PHYSICAL EXAMINATION:  Vitals:    12/30/19 1922   BP: 130/79   Pulse: 75   Temp: 97  F (36.1  C)   Weight: 199 lb 12.8 oz (90.6 kg)         GENERAL: Awake, Alert, oriented x3,unkempt in appearance,  not in any form of acute distress, answers questions appropriately, follows simple commands, conversant  HEENT: Head is normocephalic with normal hair distribution. No evidence of trauma. Ears: No acute purulent discharge. Eyes: Conjunctivae pink with no scleral jaundice. Nose: Normal mucosa and septum. NECK: Supple with no cervical or supraclavicular lymphadenopathy. Trachea is midline.   CHEST: No tenderness or deformity, no crepitus  LUNG: Clear to auscultation with good  chest expansion. There are no crackles or wheezes, normal AP diameter.  BACK: No kyphosis of the thoracic spine. Symmetric, no curvature, ROM normal, no CVA tenderness, no spinal tenderness   CVS: There is good S1  S2,  rhythm is regular.  ABDOMEN: Globular and soft, nontender to palpation, non distended, no masses, no organomegaly, good bowel sounds, no rebound or guarding, no peritoneal signs.   EXTREMITIES: ROM UE WNL, left arm with increasedSwelling, lymphedema to legs bilaterally, refer to therapy for lymph wraps.  SKIN: Warm and dry, no erythema noted, no rashes or lesions.  NEUROLOGICAL: The patient is oriented to person, place and time. Strength and sensation are grossly intact. Face is symmetric.          LABS:    Lab Results   Component Value Date    WBC 7.8 12/30/2019    HGB 9.7 (L) 12/30/2019    HCT 31.2 (L) 12/30/2019    MCV 83 12/30/2019     12/30/2019       Results for orders placed or performed in visit on 12/30/19   Basic Metabolic Panel   Result Value Ref Range    Sodium 137 136 - 145 mmol/L    Potassium 4.0 3.5 - 5.0 mmol/L    Chloride 100 98 - 107 mmol/L    CO2 30 22 - 31 mmol/L    Anion Gap, Calculation 7 5 - 18 mmol/L    Glucose 78 70 - 125 mg/dL    Calcium 9.3 8.5 - 10.5 mg/dL    BUN 21 8 - 22 mg/dL    Creatinine 0.60 (L) 0.70 - 1.30 mg/dL    GFR MDRD Af Amer >60 >60 mL/min/1.73m2    GFR MDRD Non Af Amer >60 >60 mL/min/1.73m2           Lab Results   Component Value Date    HGBA1C 5.3 12/11/2019     No results found for: [id number]  Lab Results   Component Value Date    ITCDFGPH87 321 12/11/2019       ASSESSMENT/PLAN:  1. Debilitated patient    2. Benign prostatic hyperplasia with urinary retention      1. Debilitated pt.: We will review therapy needs, debilitated and wreaked but basically debilitated at baseline. Discharge planning omgoing, will need LTC. Pt continues with therapy. Has no new concerns.    3.Urinary Retention: Valentine un place, he was to see  in 5 days due to  retention.but once again cancelled appointment. Encouraged to follow clinical advise for best health outcomes.    Electronically signed by:  Anuradha Barrett CNP  This progress note was completed using Dragon software and there may be grammatical errors.

## 2021-06-04 NOTE — TELEPHONE ENCOUNTER
[facility] Nurse Triage Telephone Note      Provider: KIMI Chamorro  Facility: [facility]    Facility Type: TCU    Caller: Kassandra  Call Back Number:  [phone]    Allergies: Lisinopril; Amoxicillin; Fosinopril sodium; Tramadol; Amoxicillin-pot clavulanate; Cephalosporins; and Penicillins    Reason for call: Nurse reporting Heme 2, BMP and Mg levels.  Patient is currently on magnesium oxide 400mg daily.       Verbal Order/Direction given by Provider: Increase magnesium oxide to 400mg two times a day.      Provider giving order: KIMI Chamorro    Verbal order given to: Kassandra Omalley RN

## 2021-06-04 NOTE — PROGRESS NOTES
[facility]      NAME:  Rashad Caputo             :  1950    MRN: [id number]    CODE STATUS:  FULL CODE    FACILITY: [facility] [[id number]]         CHIEF COMPLAIN/REASON FOR VISIT:  Chief Complaint   Patient presents with     Problem Visit       HISTORY OF PRESENT ILLNESS: Rashad Caputo is a 69 y.o. male being seen for review of multiple medical conditions,.Nurses report that Rashad felt he was having a stroke and he couldn't speak other than garbled words. B/P  Pulse reviewed, stable. Speaking articulatley this am. He was up until 5 am and is sleepy, I suspect this is related to his speech change and fatigue. . He has a shen in place, dark sabrina with sedemit.  Per PMH: Patient presented to the hospital with acute urinary retention.  He had a Shen catheter placed and currently discharged with an indwelling Shen catheter.  He will follow with urology for a voiding trial. He was noted to have hematuria on , improved today. He developed a UTI cultures grew strep viridans.  He is currently done with abx treatment, pain stable.    Allergies   Allergen Reactions     Lisinopril Shortness Of Breath and Dizziness     Fosinopril Sodium Other (See Comments) and Dizziness     Mouth numbness     Tramadol Other (See Comments)     sweating     Amoxicillin-Pot Clavulanate Hives and Rash     Cephalosporins Hives and Rash     Penicillins Rash   :     Current Outpatient Medications   Medication Sig     acetaminophen (TYLENOL) 325 MG tablet Take 650 mg by mouth every 6 (six) hours as needed.      cyclobenzaprine (FLEXERIL) 10 MG tablet Take 1 tablet (10 mg total) by mouth 2 (two) times a day as needed for muscle spasms.     diclofenac sodium (VOLTAREN) 1 % Gel Apply 1 g topically 3 (three) times a day. Apply to bilateral knees/hips     fluticasone propionate (FLONASE) 50 mcg/actuation nasal spray Apply 1 spray into each nostril every 8 (eight) hours as needed. For overuse of  nasal decongestant.            furosemide (LASIX) 20 MG tablet Take 40 mg by mouth 2 (two) times a day at 9am and 6pm.      furosemide (LASIX) 40 MG tablet Take 80 mg by mouth in the morning and 40 mg by mouth at night for 1 week.     gabapentin (NEURONTIN) 100 MG capsule Take 200 mg by mouth 3 (three) times a day.     magnesium oxide (MAG-OX) 400 mg (241.3 mg magnesium) tablet Take 400 mg by mouth daily.     naproxen sodium (ALEVE) 220 MG tablet Take 220 mg by mouth 2 (two) times a day with meals.            phenylephrine (NICOLE-SYNEPHRINE) 1 % Spry 1-2 sprays into each nostril every 4 (four) hours as needed.            polyethylene glycol (MIRALAX) 17 gram packet Take 1 packet (17 g total) by mouth daily.     QUEtiapine (SEROQUEL) 50 MG tablet Take 50 mg by mouth at bedtime.     sodium chloride (OCEAN) 0.65 % nasal spray 1 spray into each nostril as needed for congestion. Use with phenylephrine spray.     tamsulosin (FLOMAX) 0.4 mg cap Take 0.4 mg by mouth Daily after breakfast.                REVIEW OF SYSTEMS:    Currently, no fever, chills, or rigors. Does not have any visual or hearing problems. Denies any chest pain, headaches, palpitations, lightheadedness, dizziness, shortness of breath, or cough. Appetite is good. Denies any GERD symptoms. Denies any difficulty with swallowing, nausea, or vomiting.  Denies any abdominal pain, was constipated now with loose stools d/t prune juice. Denies any urinary symptoms other then retention,. No insomnia. No active bleeding . No rash. Pain to left arm      PHYSICAL EXAMINATION:  Vitals:    12/09/19 1327   BP: 135/84   Pulse: 80   Weight: (!) 252 lb 3.2 oz (114.4 kg)         GENERAL: Awake, Alert, oriented x3,unkempt in appearance,  not in any form of acute distress, answers questions appropriately, follows simple commands, conversant  HEENT: Head is normocephalic with normal hair distribution. No evidence of trauma. Ears: No acute purulent discharge. Eyes: Conjunctivae  pink with no scleral jaundice. Nose: Normal mucosa and septum. NECK: Supple with no cervical or supraclavicular lymphadenopathy. Trachea is midline.   CHEST: No tenderness or deformity, no crepitus  LUNG: Clear to auscultation with good chest expansion. There are no crackles or wheezes, normal AP diameter.  BACK: No kyphosis of the thoracic spine. Symmetric, no curvature, ROM normal, no CVA tenderness, no spinal tenderness   CVS: There is good S1  S2,  rhythm is regular.  ABDOMEN: Globular and soft, nontender to palpation, non distended, no masses, no organomegaly, good bowel sounds, no rebound or guarding, no peritoneal signs.   EXTREMITIES: ROM UE WNL, left arm with increasedSwelling, lymphedema to legs  SKIN: Warm and dry, no erythema noted, no rashes or lesions.  NEUROLOGICAL: The patient is oriented to person, place and time. Strength and sensation are grossly intact. Face is symmetric.          LABS:    Lab Results   Component Value Date    WBC 5.6 12/09/2019    HGB 9.4 (L) 12/09/2019    HCT 30.3 (L) 12/09/2019    MCV 83 12/09/2019     12/09/2019       Results for orders placed or performed in visit on 12/03/19   Basic Metabolic Panel   Result Value Ref Range    Sodium 140 136 - 145 mmol/L    Potassium 4.4 3.5 - 5.0 mmol/L    Chloride 104 98 - 107 mmol/L    CO2 28 22 - 31 mmol/L    Anion Gap, Calculation 8 5 - 18 mmol/L    Glucose 95 70 - 125 mg/dL    Calcium 9.3 8.5 - 10.5 mg/dL    BUN 39 (H) 8 - 22 mg/dL    Creatinine 0.78 0.70 - 1.30 mg/dL    GFR MDRD Af Amer >60 >60 mL/min/1.73m2    GFR MDRD Non Af Amer >60 >60 mL/min/1.73m2           No results found for: HGBA1C  No results found for: [id number]  No results found for: [id number]    ASSESSMENT/PLAN:  1. Left arm swelling    2. Debilitated patient      1. Continues with fluid overload and wt gain. We recently doubled his lasix . Gradual trend up with rapid wt increase 30 pounds in one month. Did go to ED but was sent back same day with relatively  same orders. I am ordering labs today as he did feel his voice is more slurred, his BP/P is stable, clear articulation at this time.  D dimer, BNP, BMP and CBC to be drawn this am.    2. Debilitated pt. He continues with therapy , PT X3 weekly. 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            Electronically signed by:  Anuradha Barrett CNP  This progress note was completed using Dragon software and there may be grammatical errors.

## 2021-06-04 NOTE — PROGRESS NOTES
"  [facility]      NAME:  Rashad Caputo             :  1950    MRN: [id number]    CODE STATUS:  FULL CODE    FACILITY: [facility] [[id number]]         CHIEF COMPLAIN/REASON FOR VISIT:  Chief Complaint   Patient presents with     Review Of Multiple Medical Conditions       HISTORY OF PRESENT ILLNESS: Rashad Caputo is a 69 y.o. male being seen for review of multiple medical condition. He was on the TCU and sent to acute care due to gradual wt increase of 60 pounds or so. We had sent him in several times for lymphedema, but he was always sent back. But on day of dc from TCU, .Nurses reporedt that Rashad felt he was having a stroke and he couldn't speak other than garbled words. B/P  Pulse reviewed, stable. Speaking articulatley this am. He was up until 5 am and is sleepy, I suspect this is related to his speech change and fatigue. .I had ordered d dimer and it was elevated and we sent him in. He was finally admitted.He started out at [facility] then sent to [place], in from  to 2019. Per his dc summary from hospital \" presenting with hypothermia, sepsis secondary to probable catheter associated urinary tract infection, dysarthria, acute diastolic heart failure     Septic shock: Etiology presumed secondary to catheter associated urinary tract infection with urine culture MSSA.  Blood cultures x3 no growth to date.  Required ICU admission and initial vasopressor and broad-spectrum IV antibiotic support.  Current septic shock resolved.     Catheter associated UTI with sepsis: Urine culture MSSA.  Patient refused to have Valentine catheter exchanged for new one as he would like to follow-up as outpatient with his urologist.  I have placed an order for referral for [facility] for him to be seen in the clinic within 7 days time to have catheter exchanged out for new one.  Counseled patient on the importance of exchanging catheter and he acknowledged understanding " "was able to recite potential complications of not following recommendations of exchanging catheter out for a clean 1.  He still refused.  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 We reviewed meds, TCU routime and ongoing POLST and advance care planning.       Allergies   Allergen Reactions     Lisinopril Shortness Of Breath and Dizziness     Amoxicillin      [facility]ergy [facility].     Fosinopril Sodium Other (See Comments) and Dizziness     Mouth numbness     Tramadol Other (See Comments)     sweating     Amoxicillin-Pot Clavulanate Hives and Rash     Cephalosporins Hives and Rash     Penicillins Rash   :     Current Outpatient Medications   Medication Sig     acetaminophen (TYLENOL) 500 MG tablet Take 1 tablet (500 mg total) by mouth every 4 (four) hours as needed for pain. Do not exceed 4000 mg in 24 hours.     aspirin 81 MG EC tablet Take 1 tablet (81 mg total) by mouth daily.     bumetanide (BUMEX) 1 MG tablet Take 1 tablet (1 mg total) by mouth daily.     carvedilol (COREG) 6.25 MG tablet Take 1 tablet (6.25 mg total) by mouth 2 (two) times a day.     cyclobenzaprine (FLEXERIL) 10 MG tablet Take 1 tablet (10 mg total) by mouth every 12 (twelve) hours as needed for muscle spasms.     diclofenac sodium (VOLTAREN) 1 % Gel Apply topically every 8 (eight) hours as needed (pain). Apply to arm/knee.     fluticasone propionate (FLONASE) 50 mcg/actuation nasal spray Apply 1 spray into each nostril 2 (two) times a day.     gabapentin (NEURONTIN) 100 MG capsule Take 200 mg by mouth 3 (three) times a day.     hydrocortisone (CORTEF) 5 MG tablet Take 1 tablet (5 mg total) by mouth 2 (two) times a day. Do not stop this medicine unless MD discontinues due to possible Adrenal Insufficiency Diagnosis     magnesium oxide (MAG-OX) " 400 mg (241.3 mg magnesium) tablet Take 400 mg by mouth daily.     polyethylene glycol (MIRALAX) 17 gram packet Take 1 packet (17 g total) by mouth daily.     QUEtiapine (SEROQUEL) 50 MG tablet Take 50 mg by mouth at bedtime.     senna-docusate (PERICOLACE) 8.6-50 mg tablet Take 1 tablet by mouth 2 (two) times a day.     sodium chloride (OCEAN) 0.65 % nasal spray Apply 1 spray into each nostril every 4 (four) hours as needed for congestion. Use with phenylephrine spray.      tamsulosin (FLOMAX) 0.4 mg cap Take 0.4 mg by mouth Daily after breakfast.            thiamine 100 MG tablet Take 1 tablet (100 mg total) by mouth daily.         REVIEW OF SYSTEMS:    Currently, no fever, chills, or rigors. Does not have any visual or hearing problems. Denies any chest pain, headaches, palpitations, lightheadedness, dizziness, shortness of breath, or cough. Appetite is good. Denies any GERD symptoms. Denies any difficulty with swallowing, nausea, or vomiting.  Denies any abdominal pain,  with loose stools d/t prune juice. Denies any urinary symptoms other then retention,. No insomnia. No active bleeding . No rash. Pain to left arm      PHYSICAL EXAMINATION:  Vitals:    12/23/19 1840   BP: 95/63   Pulse: 69   Temp: 96.9  F (36.1  C)   Weight: 197 lb (89.4 kg)         GENERAL: Awake, Alert, oriented x3,unkempt in appearance,  not in any form of acute distress, answers questions appropriately, follows simple commands, conversant  HEENT: Head is normocephalic with normal hair distribution. No evidence of trauma. Ears: No acute purulent discharge. Eyes: Conjunctivae pink with no scleral jaundice. Nose: Normal mucosa and septum. NECK: Supple with no cervical or supraclavicular lymphadenopathy. Trachea is midline.   CHEST: No tenderness or deformity, no crepitus  LUNG: Clear to auscultation with good chest expansion. There are no crackles or wheezes, normal AP diameter.  BACK: No kyphosis of the thoracic spine. Symmetric, no curvature,  ROM normal, no CVA tenderness, no spinal tenderness   CVS: There is good S1  S2,  rhythm is regular.  ABDOMEN: Globular and soft, nontender to palpation, non distended, no masses, no organomegaly, good bowel sounds, no rebound or guarding, no peritoneal signs.   EXTREMITIES: ROM UE WNL, left arm with increasedSwelling, lymphedema to legs bilaterally, refer to therapy for lymph wraps.  SKIN: Warm and dry, no erythema noted, no rashes or lesions.  NEUROLOGICAL: The patient is oriented to person, place and time. Strength and sensation are grossly intact. Face is symmetric.          LABS:    Lab Results   Component Value Date    WBC 10.5 12/18/2019    HGB 9.6 (L) 12/18/2019    HCT 30.6 (L) 12/18/2019    MCV 83 12/18/2019     12/18/2019       Results for orders placed or performed during the hospital encounter of 12/09/19   Basic Metabolic Panel   Result Value Ref Range    Sodium 136 136 - 145 mmol/L    Potassium 4.4 3.5 - 5.0 mmol/L    Chloride 102 98 - 107 mmol/L    CO2 29 22 - 31 mmol/L    Anion Gap, Calculation 5 5 - 18 mmol/L    Glucose 98 70 - 125 mg/dL    Calcium 8.6 8.5 - 10.5 mg/dL    BUN 40 (H) 8 - 22 mg/dL    Creatinine 0.73 0.70 - 1.30 mg/dL    GFR MDRD Af Amer >60 >60 mL/min/1.73m2    GFR MDRD Non Af Amer >60 >60 mL/min/1.73m2           Lab Results   Component Value Date    HGBA1C 5.3 12/11/2019     No results found for: [id number]  Lab Results   Component Value Date    [id number] 321 12/11/2019       ASSESSMENT/PLAN:  1. Other hypervolemia    2. Debilitated patient    3. Urinary retention      1. Fluid overload : Return from acute care with 60 pound wt loss diuressing. We reviewed diet and fluid intake today.        2. Debilitated pt.: We will review therapy needs, debilitated and wreaked but basically debilitated at baseline. Discharge planning omgoing, will need LTC.    3.Urinary Retention: Valentine un place, he is to see  in 5 days due to retention. HO ongoing UTI'S.    4, ACP: Greater then 16  minutes spent face to face with Rashad reviewing POC including advance care planning and review of POLST which is signed and is full code.                  Electronically signed by:  Anuradha Barrett CNP  This progress note was completed using Dragon software and there may be grammatical errors.

## 2021-06-04 NOTE — PROGRESS NOTES
"  [facility]      NAME:  Rashad Caputo             :  1950    MRN: [id number]    CODE STATUS:  FULL CODE    FACILITY: [facility] [[id number]]         CHIEF COMPLAIN/REASON FOR VISIT:  Chief Complaint   Patient presents with     Review Of Multiple Medical Conditions       HISTORY OF PRESENT ILLNESS: Rashad Caputo is a 69 y.o. male being seen for review of multiple medical condition. He was on the TCU and sent to acute care due to gradual wt increase of 60 pounds or so. We had sent him in several times for lymphedema, but he was always sent back. But on day of dc from TCU, .Nurses reporedt that Rashad felt he was having a stroke and he couldn't speak other than garbled words. B/P  Pulse reviewed, stable. Speaking articulatley this am. He was up until 5 am and is sleepy, I suspect this is related to his speech change and fatigue. .I had ordered d dimer and it was elevated and we sent him in. He was finally admitted.He started out at [facility] then sent to [place], in from  to 2019. Per his dc summary from hospital \" presenting with hypothermia, sepsis secondary to probable catheter associated urinary tract infection, dysarthria, acute diastolic heart failure     Septic shock: Etiology presumed secondary to catheter associated urinary tract infection with urine culture MSSA.  Blood cultures x3 no growth to date.  Required ICU admission and initial vasopressor and broad-spectrum IV antibiotic support.  Current septic shock resolved.     Catheter associated UTI with sepsis: Urine culture MSSA.  Patient refused to have Valentine catheter exchanged for new one as he would like to follow-up as outpatient with his urologist.  I have placed an order for referral for [facility] for him to be seen in the clinic within 7 days time to have catheter exchanged out for new one.  Counseled patient on the importance of exchanging catheter and he acknowledged understanding " "was able to recite potential complications of not following recommendations of exchanging catheter out for a clean 1.  He still refused.  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 WT creeping up, but did see cardio yesterday and had a bumex bump. Continues on daily wts and we are going to review a BMP next week.    Allergies   Allergen Reactions     Lisinopril Shortness Of Breath and Dizziness     Amoxicillin      [facility]ergy is [facility] records.     Fosinopril Sodium Other (See Comments) and Dizziness     Mouth numbness     Tramadol Other (See Comments)     sweating     Amoxicillin-Pot Clavulanate Hives and Rash     Cephalosporins Hives and Rash     Penicillins Rash   :     Current Outpatient Medications   Medication Sig     acetaminophen (TYLENOL) 500 MG tablet Take 1 tablet (500 mg total) by mouth every 4 (four) hours as needed for pain. Do not exceed 4000 mg in 24 hours.     aspirin 81 MG EC tablet Take 1 tablet (81 mg total) by mouth daily.     bumetanide (BUMEX) 1 MG tablet Take 1 tablet (1 mg total) by mouth daily.     carvedilol (COREG) 6.25 MG tablet Take 1 tablet (6.25 mg total) by mouth 2 (two) times a day.     cyclobenzaprine (FLEXERIL) 10 MG tablet Take 1 tablet (10 mg total) by mouth every 12 (twelve) hours as needed for muscle spasms.     diclofenac sodium (VOLTAREN) 1 % Gel Apply topically every 8 (eight) hours as needed (pain). Apply to arm/knee.     fluticasone propionate (FLONASE) 50 mcg/actuation nasal spray Apply 1 spray into each nostril 2 (two) times a day.     gabapentin (NEURONTIN) 100 MG capsule Take 200 mg by mouth 3 (three) times a day.     hydrocortisone (CORTEF) 5 MG tablet Take 1 tablet (5 mg total) by mouth 2 (two) times a day. Do not stop this medicine unless MD discontinues due to possible Adrenal " Insufficiency Diagnosis     magnesium oxide (MAG-OX) 400 mg (241.3 mg magnesium) tablet Take 400 mg by mouth 2 (two) times a day.      polyethylene glycol (MIRALAX) 17 gram packet Take 1 packet (17 g total) by mouth daily.     QUEtiapine (SEROQUEL) 50 MG tablet Take 50 mg by mouth at bedtime.     senna-docusate (PERICOLACE) 8.6-50 mg tablet Take 1 tablet by mouth 2 (two) times a day.     sodium chloride (OCEAN) 0.65 % nasal spray Apply 1 spray into each nostril every 4 (four) hours as needed for congestion. Use with phenylephrine spray.      tamsulosin (FLOMAX) 0.4 mg cap Take 0.4 mg by mouth Daily after breakfast.            thiamine 100 MG tablet Take 1 tablet (100 mg total) by mouth daily.         REVIEW OF SYSTEMS:    Currently, no fever, chills, or rigors. Does not have any visual or hearing problems. Denies any chest pain, headaches, palpitations, lightheadedness, dizziness, shortness of breath, or cough. Appetite is good. Denies any GERD symptoms. Denies any difficulty with swallowing, nausea, or vomiting.  Denies any abdominal pain,  with loose stools d/t prune juice. Denies any urinary symptoms other then retention,. No insomnia. No active bleeding . No rash. Pain to left arm has been stable      PHYSICAL EXAMINATION:  Vitals:    01/05/20 1442   BP: 153/89   Pulse: 76   Temp: (!) 96.5  F (35.8  C)   Weight: 213 lb 9.6 oz (96.9 kg)         GENERAL: Awake, Alert, oriented x3,unkempt in appearance,  not in any form of acute distress, answers questions appropriately, follows simple commands, conversant  HEENT: Head is normocephalic with normal hair distribution. No evidence of trauma. Ears: No acute purulent discharge. Eyes: Conjunctivae pink with no scleral jaundice. Nose: Normal mucosa and septum. NECK: Supple with no cervical or supraclavicular lymphadenopathy. Trachea is midline.   CHEST: No tenderness or deformity, no crepitus  LUNG: Clear to auscultation with good chest expansion. There are no crackles or  wheezes, normal AP diameter.  BACK: No kyphosis of the thoracic spine. Symmetric, no curvature, ROM normal, no CVA tenderness, no spinal tenderness   CVS: There is good S1  S2,  rhythm is regular.  ABDOMEN: Globular and soft, nontender to palpation, non distended, no masses, no organomegaly, good bowel sounds, no rebound or guarding, no peritoneal signs.   EXTREMITIES: ROM UE WNL, left arm with increasedSwelling, lymphedema to legs bilaterally, refer to therapy for lymph wraps.  SKIN: Warm and dry, no erythema noted, no rashes or lesions.  NEUROLOGICAL: The patient is oriented to person, place and time. Strength and sensation are grossly intact. Face is symmetric.          LABS:    Lab Results   Component Value Date    WBC 7.8 12/30/2019    HGB 9.7 (L) 12/30/2019    HCT 31.2 (L) 12/30/2019    MCV 83 12/30/2019     12/30/2019       Results for orders placed or performed in visit on 12/30/19   Basic Metabolic Panel   Result Value Ref Range    Sodium 137 136 - 145 mmol/L    Potassium 4.0 3.5 - 5.0 mmol/L    Chloride 100 98 - 107 mmol/L    CO2 30 22 - 31 mmol/L    Anion Gap, Calculation 7 5 - 18 mmol/L    Glucose 78 70 - 125 mg/dL    Calcium 9.3 8.5 - 10.5 mg/dL    BUN 21 8 - 22 mg/dL    Creatinine 0.60 (L) 0.70 - 1.30 mg/dL    GFR MDRD Af Amer >60 >60 mL/min/1.73m2    GFR MDRD Non Af Amer >60 >60 mL/min/1.73m2           Lab Results   Component Value Date    HGBA1C 5.3 12/11/2019     No results found for: [id number]  Lab Results   Component Value Date    [id number] 321 12/11/2019       ASSESSMENT/PLAN:  1. Acute diastolic CHF (congestive heart failure), NYHA class 3 (H)    2. Urinary retention      1.chf: FU apt with cardio, increased his Bumex to 2 mg po daily X3 days. I did add a BMP to labs next week. Wts daily per nursing.    2.Urinary Retention: Valentine un place, he was to see  in 5 days due to retention.but once again cancelled appointment. Encouraged to follow clinical advise for best health outcomes.  Nursing staff provides shen care.    Electronically signed by:  Anuradha Barrett CNP  This progress note was completed using Dragon software and there may be grammatical errors.

## 2021-06-04 NOTE — PROGRESS NOTES
[facility] Admission note      Patient: Rashad Caputo  MRN: [id number]  Date of Service: 12/24/2019      [facility] [[id number]]  Reason for Visit     Chief Complaint   Patient presents with     H & P       Code Status     FULL CODE    Assessment     -Acute diastolic congestive heart failure with acute exacerbation  -Sepsis secondary to catheter associated UTI  -Hypothermia on admission  -MSSA UTI  -Anxiety with depression  -Bilateral lower extremity edema work-up negative for DVT  -Chronic upper extremity swelling with bilateral anterior dislocation of bilateral shoulders  -Severe osteoarthritis of the right knee  -Chronic low back pain with patient reporting he needs surgical repair  -Acute onset of dysarthria; MRI of the brain was negative  -Rhinitis medicamentosa taken off Afrin  -Underlying concern for severe anxiety with mood disorder patient refusing medication  - HTN  -Hyperkalemia  -Suspected adrenal insufficiency discharged on steroids  -Profound decline with debilitation now downgraded to a Anshu prior to discharge    Plan     Patient had a very prolonged and complex stay in the hospital.  He was evaluated by multiple specialists including cardiology for his CHF concerns, infectious disease because of ongoing issues with sepsis; psychiatry as well as orthopedics.  He unfortunately remained resistant to care he refused to start multiple medication.  He also refused further work-ups as recommended by the specialist   he refused catheter exchange.  This was recommended by both the hospitalist and urology for him.  He has refused cardiac work-up including stress test to be done in the hospital.  He also was evaluated for anxiety but refused psychotropic medications.  Psychiatry has recommended that he be evaluated as an outpatient unfortunately this is an ongoing issue and he has refused any medication to assist him with anxiety  He was admitted with progressive weight gain and  CHF exacerbation.  He was given IV diuretics with good response and has ended up losing close to 29 kg of weight  He has been discharged to the TCU on oral Bumex and monitoring of weights.  He has been referred for CHF outpatient follow-up but refused further work-up in the hospital including a stress test.  Echocardiogram did show an EF of 15 4% with no valvular disease.  In addition he also was recommended to go for a catheter exchange the setting of UTI with cultures growing MSSA.  He has refused compliance with that and understands that he will need to do this as an outpatient.  Due to hyperkalemia and renal insufficiency has been given steroids.  Outpatient work-up with endocrinology recommended for him not sure if he will keep that appointment.  He was noted to have chronic anterior dislocation of bilateral shoulders.  This is resulted in significant edema bilateral extremities  left greater than right.  He was evaluated by orthopedics.  Imaging did reveal full-thickness tearing of the supraspinatus infraspinatus and subscapularis tendons with rotator cuff muscle atrophy.  Septic work-up was negative with no evidence of septic shoulders.  Orthopedics has recommended continuation of activities with no restrictions.  No surgical intervention is planned.  He has bilateral lower extremity my work-up was negative for any DVT.  Unfortunately he has progressively gotten worse and now requires a Anshu lift for transfers.  I did discuss him getting knee surgeries versus having his shoulders fixed and he told me he had talked to his friends and has elected not to pursue any further work-up because they told him it would not be worth it  He had another episode of extreme anxiety last night when he got into a verbal altercation with staff.  He is reporting that it was not because of anxiety but mainly because of staff not doing what they were expected to do.  This is been an ongoing issue with him when he gets anxious and  generally ends up in discord with multiple staff member especially worse in the evening.  The clinical psychologist seeing him had recommended psychotropic meds which she refuses.  Encouraged him to think about it again.  Discharge planning reviewed with him his wife is already in resident at another nursing home.  He has made up his mind he will not be discharging to that facility.  He is not sure what he wants to do and is hoping his daughter will find him appropriate placement at present he has no discharge planning  Total time spent is 45 minutes with more than 35 minutes spent face-to-face talking to the patient reviewing his care concerns including management of his multiple joints with osteoarthritis and severe disability.  Also reviewed was discharge planning and anxiety management and his behavioral dysregulation especially in the evening    History     Patient is a very pleasant 69 y.o. male who is REadmitted to TCU  He has been readmitted to the nursing home after a prolonged and complex stay in the hospital with multiple issues.  He was admitted in the hospital with septic shock associated with hypothermia.  This was felt to be secondary to catheter associated UTI.  Urine cultures did grow MSSA however blood cultures were negative.  He initially was admitted to the ICU and required vasopressor and broad-spectrum IV support.  Currently septic shock issues have resolved nicely.  Patient has an underlying history of chronic urinary retention.  While in the hospital he refused to have a catheter exchange done.  It has been recommended that he do an outpatient follow-up with his urologist to have catheter exchange done.  He has been discharged on oral doxycycline as prescribed by infectious disease last dose was finished on 12/20/2019.  He had acute on chronic diastolic congestive heart failure his EF on echocardiogram was 50%.  He was treated with diuretics.  Eventually patient had a significant response he  has been discharged on oral Bumex and a cardiac diet with recommendation to monitor ins and outs closely.  Unfortunately he has been resistant to care in the hospital refusing stress testing and further work-up.  So had work-up done for possible adrenal insufficiency.  He is currently given hydrocortisone 5 mg twice daily and recommended to follow-up with endocrinology.  Patient has significant anxiety with mood disorder initially refused any medications eventually psychiatry did see him he declined mental health resources they have recommended outpatient work-up        Past Medical History     Active Ambulatory (Non-Hospital) Problems    Diagnosis     Adjustment disorder with mixed anxiety and depressed mood     Acute diastolic CHF (congestive heart failure), NYHA class 3 (H)     Dysarthria     Hypothermia, initial encounter     Acute congestive heart failure, unspecified heart failure type (H)     Pain     Left arm swelling     Fluid overload     Lymphedema     Allergic rhinitis     H/O noncompliance with medical treatment, presenting hazards to health     Failure to thrive in adult     Cognitive impairment     Weight gain     Stool incontinence     Insomnia     Discharge planning issues     Debilitated patient     Urinary retention     Urinary obstruction     Urinary tract infection     Benign prostatic hyperplasia with lower urinary tract symptoms     Post-void dribbling     Rhinitis medicamentosa     Sepsis due to urinary tract infection (H)     Sepsis (H)     CAD (coronary artery disease)     Non-STEMI (non-ST elevated myocardial infarction) (H)     Tachycardia     Anxiety     Spinal stenosis     Essential hypertension, benign     Benign Prostatic Hypertrophy     Myalgia And Myositis     Urinary Tract Infection     Feelings Of Urinary Urgency     Joint Pain, Localized In The Hip     Past Medical History:   Diagnosis Date     Acute combined systolic and diastolic CHF, NYHA class 1 (H) 12/10/2019     Arthritis       Benign prostatic hyperplasia with lower urinary tract symptoms 2/23/2017     BPH (benign prostatic hypertrophy)      Coronary artery disease      History of transfusion 1975     Hypertension      Pneumonia 1980     Post-void dribbling 2/23/2017     Retinal tear of right eye      Rhinitis medicamentosa 2/23/2017     Spinal stenosis      Spinal stenosis      UTI (urinary tract infection)        Past Social History     Reviewed, and he  reports that he quit smoking about 35 years ago. He smoked 0.00 packs per day. He has never used smokeless tobacco. He reports that he does not drink alcohol or use drugs.    Family History     Reviewed, and history of cataracts and coronary vascular disease in his father.  His mother had dementia grandmother had diabetes    Medication List   Post Discharge Medication Reconciliation Status: discharge medications reconciled, continue medications without change   Current Outpatient Medications on File Prior to Visit   Medication Sig Dispense Refill     acetaminophen (TYLENOL) 500 MG tablet Take 1 tablet (500 mg total) by mouth every 4 (four) hours as needed for pain. Do not exceed 4000 mg in 24 hours.  0     aspirin 81 MG EC tablet Take 1 tablet (81 mg total) by mouth daily.  0     bumetanide (BUMEX) 1 MG tablet Take 1 tablet (1 mg total) by mouth daily. 30 tablet 0     carvedilol (COREG) 6.25 MG tablet Take 1 tablet (6.25 mg total) by mouth 2 (two) times a day. 10 tablet 0     cyclobenzaprine (FLEXERIL) 10 MG tablet Take 1 tablet (10 mg total) by mouth every 12 (twelve) hours as needed for muscle spasms. 10 tablet 0     diclofenac sodium (VOLTAREN) 1 % Gel Apply topically every 8 (eight) hours as needed (pain). Apply to arm/knee.       fluticasone propionate (FLONASE) 50 mcg/actuation nasal spray Apply 1 spray into each nostril 2 (two) times a day. 16 g 12     gabapentin (NEURONTIN) 100 MG capsule Take 200 mg by mouth 3 (three) times a day.       hydrocortisone (CORTEF) 5 MG tablet  Take 1 tablet (5 mg total) by mouth 2 (two) times a day. Do not stop this medicine unless MD discontinues due to possible Adrenal Insufficiency Diagnosis 10 tablet 0     magnesium oxide (MAG-OX) 400 mg (241.3 mg magnesium) tablet Take 400 mg by mouth daily.       polyethylene glycol (MIRALAX) 17 gram packet Take 1 packet (17 g total) by mouth daily.  0     QUEtiapine (SEROQUEL) 50 MG tablet Take 50 mg by mouth at bedtime.       senna-docusate (PERICOLACE) 8.6-50 mg tablet Take 1 tablet by mouth 2 (two) times a day.  0     sodium chloride (OCEAN) 0.65 % nasal spray Apply 1 spray into each nostril every 4 (four) hours as needed for congestion. Use with phenylephrine spray.        tamsulosin (FLOMAX) 0.4 mg cap Take 0.4 mg by mouth Daily after breakfast.              thiamine 100 MG tablet Take 1 tablet (100 mg total) by mouth daily.  0     No current facility-administered medications on file prior to visit.        Allergies     Allergies   Allergen Reactions     Lisinopril Shortness Of Breath and Dizziness     Amoxicillin      Allergy is per [facility] [employer] [facility] records.     Fosinopril Sodium Other (See Comments) and Dizziness     Mouth numbness     Tramadol Other (See Comments)     sweating     Amoxicillin-Pot Clavulanate Hives and Rash     Cephalosporins Hives and Rash     Penicillins Rash       Review of Systems   A comprehensive review of 14 systems was done. Pertinent findings noted here and in history of present illness. All the rest negative.  Constitutional: Negative.  Negative for fever, chills, he has activity change, appetite change and fatigue.   HENT: Negative for congestion and facial swelling.    Eyes: Negative for photophobia, redness and visual disturbance.   Respiratory: Negative for cough and chest tightness.    Cardiovascular: Negative for chest pain, palpitations and leg swelling.   Gastrointestinal: Negative for nausea, diarrhea, he has chronic constipation, no blood in stool and  abdominal distention.   Genitourinary: Negative.  Has an indwelling Valentine catheter  Musculoskeletal: Negative.  Cannot ambulate any longer and requires a Anshu for transfer  Skin: Negative.    Neurological: Negative for dizziness, tremors, syncope, weakness, light-headedness and headaches.   Hematological: Does not bruise/bleed easily.   Psychiatric/Behavioral: Negative.  He is quite anxious      Physical Exam     Recent Vitals 12/23/2019   Height -   Weight 197 lbs   BSA (m2) 2.09 m2   BP 95/63   Pulse 69   Temp 96.9   Temp src -   SpO2 -   Some recent data might be hidden       Constitutional: Oriented to person, place, and time and appears well-developed.   HEENT:  Normocephalic and atraumatic.  Eyes: Conjunctivae and EOM are normal. Pupils are equal, round, and reactive to light. No discharge.  No scleral icterus. Nose normal. Mouth/Throat: Oropharynx is clear and moist. No oropharyngeal exudate.    NECK: Normal range of motion. Neck supple. No JVD present. No tracheal deviation present. No thyromegaly present.   CARDIOVASCULAR: Normal rate, regular rhythm and intact distal pulses.  Exam reveals no gallop and no friction rub.  Systolic murmur present.  PULMONARY: Effort normal and breath sounds normal. No respiratory distress.No Wheezing or rales.  ABDOMEN: Soft. Bowel sounds are normal. No distension and no mass.  There is no tenderness. There is no rebound and no guarding. No HSM.  MUSCULOSKELETAL: Normal range of motion.  2+ lower extremity edema and no tenderness. Mild kyphosis, no tenderness.  Severe osteoarthritis noted in his right knee with deformity noted.  He cannot straighten his right lower extremity and not bear weight on it.  He also has chronic anterior dislocation of both shoulders  LYMPH NODES: Has no cervical, supraclavicular, axillary and groin adenopathy.   NEUROLOGICAL: Alert and oriented to person, place, and time. No cranial nerve deficit.  Normal muscle tone. Coordination normal.    GENITOURINARY: Deferred exam.  SKIN: Skin is warm and dry. No rash noted. No erythema. No pallor.   EXTREMITIES: No cyanosis, no clubbing, 2+ lower extremity edema.  Also has edema on his arms left greater than right. no Deformity.  PSYCHIATRIC: Normal mood, affect and behavior.      Lab Results     Recent Results (from the past 240 hour(s))   POCT Glucose    Collection Time: 12/14/19 11:56 AM   Result Value Ref Range    Glucose 189 (H) 70 - 139 mg/dL   Cosyntropin Stimulation(Pre-Dose)Cortiso    Collection Time: 12/14/19  3:00 PM   Result Value Ref Range    Cortisol, Pre-dose 1.7 ug/dL   Cosyntropin Stimulation(30 Min Post-Dose    Collection Time: 12/14/19  3:30 PM   Result Value Ref Range    Cortisol, 30 min Post-dose 12.7 ug/dL   Aldosterone, Serum    Collection Time: 12/14/19  4:02 PM   Result Value Ref Range    Aldosterone 11.7 ng/dL   Cosyntropin Stimulation(60 Min Post-Dose    Collection Time: 12/14/19  4:02 PM   Result Value Ref Range    Cortisol, 60 min Post-dose 15.5 ug/dL    Time of Cortrosyn Injection 1500    POCT Glucose    Collection Time: 12/14/19  5:59 PM   Result Value Ref Range    Glucose 170 (H) 70 - 139 mg/dL   POCT Glucose    Collection Time: 12/14/19  9:48 PM   Result Value Ref Range    Glucose 124 70 - 139 mg/dL   HM2(CBC w/o Differential)    Collection Time: 12/15/19  6:02 AM   Result Value Ref Range    WBC 13.4 (H) 4.0 - 11.0 thou/uL    RBC 3.90 (L) 4.40 - 6.20 mill/uL    Hemoglobin 10.0 (L) 14.0 - 18.0 g/dL    Hematocrit 32.0 (L) 40.0 - 54.0 %    MCV 82 80 - 100 fL    MCH 25.6 (L) 27.0 - 34.0 pg    MCHC 31.3 (L) 32.0 - 36.0 g/dL    RDW 15.8 (H) 11.0 - 14.5 %    Platelets 172 140 - 440 thou/uL    MPV 11.1 8.5 - 12.5 fL   Basic Metabolic Panel    Collection Time: 12/15/19  6:02 AM   Result Value Ref Range    Sodium 139 136 - 145 mmol/L    Potassium 5.4 (H) 3.5 - 5.0 mmol/L    Chloride 97 (L) 98 - 107 mmol/L    CO2 34 (H) 22 - 31 mmol/L    Anion Gap, Calculation 8 5 - 18 mmol/L    Glucose  101 70 - 125 mg/dL    Calcium 9.1 8.5 - 10.5 mg/dL    BUN 66 (H) 8 - 22 mg/dL    Creatinine 1.10 0.70 - 1.30 mg/dL    GFR MDRD Af Amer >60 >60 mL/min/1.73m2    GFR MDRD Non Af Amer >60 >60 mL/min/1.73m2   POCT Glucose    Collection Time: 12/15/19  7:47 AM   Result Value Ref Range    Glucose 109 70 - 139 mg/dL   POCT Glucose    Collection Time: 12/15/19  1:05 PM   Result Value Ref Range    Glucose 104 70 - 139 mg/dL   POCT Glucose    Collection Time: 12/15/19  6:06 PM   Result Value Ref Range    Glucose 112 70 - 139 mg/dL   POCT Glucose    Collection Time: 12/15/19  9:35 PM   Result Value Ref Range    Glucose 112 70 - 139 mg/dL   HM2(CBC w/o Differential)    Collection Time: 12/16/19  6:32 AM   Result Value Ref Range    WBC 10.1 4.0 - 11.0 thou/uL    RBC 3.91 (L) 4.40 - 6.20 mill/uL    Hemoglobin 10.1 (L) 14.0 - 18.0 g/dL    Hematocrit 31.7 (L) 40.0 - 54.0 %    MCV 81 80 - 100 fL    MCH 25.8 (L) 27.0 - 34.0 pg    MCHC 31.9 (L) 32.0 - 36.0 g/dL    RDW 15.9 (H) 11.0 - 14.5 %    Platelets 190 140 - 440 thou/uL    MPV 11.3 8.5 - 12.5 fL   Basic Metabolic Panel    Collection Time: 12/16/19  6:32 AM   Result Value Ref Range    Sodium 138 136 - 145 mmol/L    Potassium 4.8 3.5 - 5.0 mmol/L    Chloride 99 98 - 107 mmol/L    CO2 31 22 - 31 mmol/L    Anion Gap, Calculation 8 5 - 18 mmol/L    Glucose 85 70 - 125 mg/dL    Calcium 8.9 8.5 - 10.5 mg/dL    BUN 64 (H) 8 - 22 mg/dL    Creatinine 0.88 0.70 - 1.30 mg/dL    GFR MDRD Af Amer >60 >60 mL/min/1.73m2    GFR MDRD Non Af Amer >60 >60 mL/min/1.73m2   POCT Glucose    Collection Time: 12/16/19  8:14 AM   Result Value Ref Range    Glucose 91 70 - 139 mg/dL   POCT Glucose    Collection Time: 12/16/19 12:28 PM   Result Value Ref Range    Glucose 97 70 - 139 mg/dL   POCT Glucose    Collection Time: 12/16/19  5:10 PM   Result Value Ref Range    Glucose 99 70 - 139 mg/dL   POCT Glucose    Collection Time: 12/16/19  9:05 PM   Result Value Ref Range    Glucose 126 70 - 139 mg/dL   HM2(CBC  w/o Differential)    Collection Time: 12/17/19  6:30 AM   Result Value Ref Range    WBC 11.6 (H) 4.0 - 11.0 thou/uL    RBC 3.98 (L) 4.40 - 6.20 mill/uL    Hemoglobin 10.4 (L) 14.0 - 18.0 g/dL    Hematocrit 32.3 (L) 40.0 - 54.0 %    MCV 81 80 - 100 fL    MCH 26.1 (L) 27.0 - 34.0 pg    MCHC 32.2 32.0 - 36.0 g/dL    RDW 15.9 (H) 11.0 - 14.5 %    Platelets 186 140 - 440 thou/uL    MPV 11.3 8.5 - 12.5 fL   Basic Metabolic Panel    Collection Time: 12/17/19  6:30 AM   Result Value Ref Range    Sodium 138 136 - 145 mmol/L    Potassium 4.3 3.5 - 5.0 mmol/L    Chloride 100 98 - 107 mmol/L    CO2 29 22 - 31 mmol/L    Anion Gap, Calculation 9 5 - 18 mmol/L    Glucose 85 70 - 125 mg/dL    Calcium 8.9 8.5 - 10.5 mg/dL    BUN 56 (H) 8 - 22 mg/dL    Creatinine 0.78 0.70 - 1.30 mg/dL    GFR MDRD Af Amer >60 >60 mL/min/1.73m2    GFR MDRD Non Af Amer >60 >60 mL/min/1.73m2   POCT Glucose    Collection Time: 12/17/19  8:45 AM   Result Value Ref Range    Glucose 94 70 - 139 mg/dL   POCT Glucose    Collection Time: 12/17/19 12:30 PM   Result Value Ref Range    Glucose 122 70 - 139 mg/dL   POCT Glucose    Collection Time: 12/17/19  5:31 PM   Result Value Ref Range    Glucose 94 70 - 139 mg/dL   POCT Glucose    Collection Time: 12/17/19  9:08 PM   Result Value Ref Range    Glucose 334 (H) 70 - 139 mg/dL   HM2(CBC w/o Differential)    Collection Time: 12/18/19  6:21 AM   Result Value Ref Range    WBC 10.5 4.0 - 11.0 thou/uL    RBC 3.70 (L) 4.40 - 6.20 mill/uL    Hemoglobin 9.6 (L) 14.0 - 18.0 g/dL    Hematocrit 30.6 (L) 40.0 - 54.0 %    MCV 83 80 - 100 fL    MCH 25.9 (L) 27.0 - 34.0 pg    MCHC 31.4 (L) 32.0 - 36.0 g/dL    RDW 15.9 (H) 11.0 - 14.5 %    Platelets 172 140 - 440 thou/uL    MPV 11.1 8.5 - 12.5 fL   Basic Metabolic Panel    Collection Time: 12/18/19  6:21 AM   Result Value Ref Range    Sodium 137 136 - 145 mmol/L    Potassium 4.1 3.5 - 5.0 mmol/L    Chloride 100 98 - 107 mmol/L    CO2 29 22 - 31 mmol/L    Anion Gap, Calculation 8  5 - 18 mmol/L    Glucose 87 70 - 125 mg/dL    Calcium 8.9 8.5 - 10.5 mg/dL    BUN 47 (H) 8 - 22 mg/dL    Creatinine 0.72 0.70 - 1.30 mg/dL    GFR MDRD Af Amer >60 >60 mL/min/1.73m2    GFR MDRD Non Af Amer >60 >60 mL/min/1.73m2   POCT Glucose    Collection Time: 12/18/19  8:53 AM   Result Value Ref Range    Glucose 93 70 - 139 mg/dL   POCT Glucose    Collection Time: 12/18/19 12:28 PM   Result Value Ref Range    Glucose 91 70 - 139 mg/dL   POCT Glucose    Collection Time: 12/18/19  5:09 PM   Result Value Ref Range    Glucose 92 70 - 139 mg/dL   POCT Glucose    Collection Time: 12/18/19  9:21 PM   Result Value Ref Range    Glucose 199 (H) 70 - 139 mg/dL   Basic Metabolic Panel    Collection Time: 12/19/19  6:21 AM   Result Value Ref Range    Sodium 135 (L) 136 - 145 mmol/L    Potassium 4.4 3.5 - 5.0 mmol/L    Chloride 100 98 - 107 mmol/L    CO2 29 22 - 31 mmol/L    Anion Gap, Calculation 6 5 - 18 mmol/L    Glucose 90 70 - 125 mg/dL    Calcium 8.7 8.5 - 10.5 mg/dL    BUN 42 (H) 8 - 22 mg/dL    Creatinine 0.76 0.70 - 1.30 mg/dL    GFR MDRD Af Amer >60 >60 mL/min/1.73m2    GFR MDRD Non Af Amer >60 >60 mL/min/1.73m2   POCT Glucose    Collection Time: 12/19/19  7:44 AM   Result Value Ref Range    Glucose 90 70 - 139 mg/dL   POCT Glucose    Collection Time: 12/19/19  1:12 PM   Result Value Ref Range    Glucose 104 70 - 139 mg/dL   POCT Glucose    Collection Time: 12/19/19  5:02 PM   Result Value Ref Range    Glucose 128 70 - 139 mg/dL   POCT Glucose    Collection Time: 12/19/19  8:54 PM   Result Value Ref Range    Glucose 156 (H) 70 - 139 mg/dL   Basic Metabolic Panel    Collection Time: 12/20/19  5:36 AM   Result Value Ref Range    Sodium 136 136 - 145 mmol/L    Potassium 4.4 3.5 - 5.0 mmol/L    Chloride 102 98 - 107 mmol/L    CO2 29 22 - 31 mmol/L    Anion Gap, Calculation 5 5 - 18 mmol/L    Glucose 98 70 - 125 mg/dL    Calcium 8.6 8.5 - 10.5 mg/dL    BUN 40 (H) 8 - 22 mg/dL    Creatinine 0.73 0.70 - 1.30 mg/dL    GFR  MDRD Af Amer >60 >60 mL/min/1.73m2    GFR MDRD Non Af Amer >60 >60 mL/min/1.73m2   POCT Glucose    Collection Time: 12/20/19  9:09 AM   Result Value Ref Range    Glucose 109 70 - 139 mg/dL   POCT Glucose    Collection Time: 12/20/19 12:03 PM   Result Value Ref Range    Glucose 110 70 - 139 mg/dL   POCT Glucose    Collection Time: 12/20/19  4:51 PM   Result Value Ref Range    Glucose 100 70 - 139 mg/dL   POCT Glucose    Collection Time: 12/20/19  9:10 PM   Result Value Ref Range    Glucose 164 (H) 70 - 139 mg/dL   POCT Glucose    Collection Time: 12/21/19  8:41 AM   Result Value Ref Range    Glucose 94 70 - 139 mg/dL            Imaging Results     Ct Abdomen Pelvis Without Oral Without Iv Contrast    Result Date: 12/10/2019  EXAM: CT ABDOMEN PELVIS WO ORAL WO IV CONTRAST LOCATION: [facility] DATE/TIME: 12/10/2019 5:58 PM INDICATION: Abdominal distension and pain. Severe anasarca. COMPARISON: CTA chest 12/09/2019. CT abdomen and pelvis 08/27/2019. TECHNIQUE: CT scan of the abdomen and pelvis was performed without oral or IV contrast. Multiplanar reformats were obtained. Dose reduction techniques were used. CONTRAST: None. FINDINGS: LOWER CHEST: Moderate right and small left pleural effusions. Associated atelectasis of the lung bases, right more than left. Right PICC incompletely imaged. Coronary artery calcification. HEPATOBILIARY: Normal. PANCREAS: Normal. SPLEEN: Normal. ADRENAL GLANDS: Normal. KIDNEY/BLADDER: Urinary bladder decompressed by Valentine catheter. BOWEL: No dilatation or inflammation of large or small bowel. Appendix not well seen separately. No evidence for acute appendicitis. No free fluid or gas. LYMPH NODES: Normal. VASCULATURE: Unremarkable. PELVIC ORGANS: Normal. OTHER: None. MUSCULOSKELETAL: Generalized body wall edema. Demineralized skeleton. Degenerative changes and mild convex right scoliosis of the lumbar spine.     1.  Moderate right and small left pleural effusions. Associated  atelectasis of the lung bases, right more than left. 2.  Generalized body wall edema.    Xr Chest 1 View Portable    Result Date: 12/17/2019  EXAM: XR CHEST 1 VIEW PORTABLE LOCATION: [facility] DATE/TIME: 12/17/2019 2:41 PM INDICATION: cough COMPARISON: 12/10/2019     Right PICC tip projects over the mid SVC. Persistent moderate right hemidiaphragm elevation. Persistent but improved right basilar streaky densities could represent atelectasis or pneumonia. Heart size appears normal. Left lung appears clear.    Xr Chest 2 Views    Result Date: 12/3/2019  EXAM: XR CHEST 2 VIEWS LOCATION: [facility] DATE/TIME: 12/3/2019 3:25 PM INDICATION: sob COMPARISON: AP and lateral views of the chest 11/01/2019; CT of the chest 11/01/2019     The posterior lungs were clipped on the lateral view. The right atrial heart border is obscured. Otherwise normal and unchanged cardiac and mediastinal interfaces. Elevation of the right hemidiaphragm. A focal opacity is present in the basal right lung linear in character consistent with focal atelectasis. There was similar but less extensive atelectasis in this location on the comparison radiograph and CT extensive. No pleural fluid or pneumothorax. Asymmetric increased soft tissue around the left shoulder and question perisistant/recurrant anterior left shoulder dislocation.     Ct Head Without Contrast    Result Date: 12/9/2019  EXAM: CT HEAD WO CONTRAST LOCATION: [facility] DATE/TIME: 12/9/2019 6:18 PM INDICATION: Slurring words. Strokelike symptoms. COMPARISON: None. TECHNIQUE: Routine without IV contrast. Multiplanar reformats. Dose reduction techniques were used. FINDINGS: INTRACRANIAL CONTENTS: No intracranial hemorrhage, extraaxial collection, or mass effect.  No CT evidence of acute infarct. Mild presumed chronic small vessel ischemic changes. Mild generalized volume loss. No hydrocephalus. Skull-based vascular calcifications. Normal brainstem and  cerebellum. Gray matter/white matter differentiation normal at all levels and there are no early signs of acute infarct. VISUALIZED ORBITS/SINUSES/MASTOIDS: No intraorbital abnormality. No paranasal sinus mucosal disease. No middle ear or mastoid effusion. BONES/SOFT TISSUES: No acute abnormality.     No acute intracranial process.    Mr Brain With Without Contrast    Result Date: 12/11/2019  EXAM: MR BRAIN W WO CONTRAST LOCATION: [facility] DATE/TIME: 12/11/2019 1:16 AM INDICATION: Motor neuron disease, slurred speech COMPARISON: Head CT 12/09/2019 CONTRAST: Gadavist 10 mL TECHNIQUE: Routine multiplanar multisequence head MRI without and with intravenous contrast. FINDINGS: Many of the sequences are degraded by motion. INTRACRANIAL CONTENTS: No acute or subacute infarct. No mass, acute hemorrhage, or extra-axial fluid collections. Scattered nonspecific T2/FLAIR hyperintensities within the cerebral white matter most consistent with mild chronic microvascular ischemic change. Mild generalized cerebral atrophy. No hydrocephalus. Normal position of the cerebellar tonsils. No pathologic contrast enhancement. SELLA: No abnormality accounting for technique. OSSEOUS STRUCTURES/SOFT TISSUES: Normal marrow signal. The major intracranial vascular flow voids are maintained. ORBITS: No abnormality accounting for technique. SINUSES/MASTOIDS: No paranasal sinus mucosal disease. No middle ear or mastoid effusion.     1.  No recent infarct, intracranial mass, abnormal enhancement or evidence of intracranial hemorrhage. 2.  Mild cerebral volume loss and presumed chronic small vessel ischemic changes.     Us Abdomen Complete    Result Date: 12/11/2019  EXAM: US ABDOMEN COMPLETE LOCATION: [facility] DATE/TIME: 12/11/2019 2:06 AM INDICATION: 70 y/o man with edema, unclear cause, check liver, kidneys and dopper vessels COMPARISON: 12/10/2019. TECHNIQUE: Complete abdominal ultrasound. FINDINGS: GALLBLADDER: No evidence  cholelithiasis. Gallbladder wall thickness measures 3 mm. Negative Trevino sign. BILE DUCTS: Common duct not visualized due to bowel gas. No ductal dilatation demonstrated. LIVER: Limited visualization. No focal mass demonstrated. The liver measures 17.9 cm. RIGHT KIDNEY: Measures 10.3 x 4.5 x 5 cm. No hydronephrosis. LEFT KIDNEY: Not visualized due to bowel gas. SPLEEN: Not visualized due to bowel gas. PANCREAS: Not visualized due to bowel gas. AORTA: Distal aorta measures 2.2 cm. Proximal and mid aorta not visualized. IVC: Limited visualization. Difficult examination due to bowel gas.     1.  Difficult examination due to bowel gas. 2.  No evidence of cholelithiasis. 3.  No ductal dilatation demonstrated.    Xr Chest 1 View For Picc Placement Portable    Result Date: 12/10/2019  EXAM: XR CHEST 1 VIEW FOR PICC LINE PLACEMENT PORTABLE LOCATION: [facility] DATE/TIME: 12/10/2019 2:30 PM INDICATION: verify catheter placement COMPARISON: AP and lateral views of the chest 12/03/2019     The new right PICC terminates in the middle third of the SVC. The image was acquired with caudal beam angulation. Projectional increased opacity in the basal right lung particularly medially. The right atrial heart border remains largely obscured. No change in other cardiac or mediastinal borders. No accumulation of pleural fluid. No pneumothorax.    Cta Chest Pe Run    Result Date: 12/9/2019  EXAM: CTA CHEST PE RUN LOCATION: [facility] DATE/TIME: 12/9/2019 6:19 PM INDICATION: Shortness of breath sob COMPARISON: 11/01/2019 TECHNIQUE: CT angiogram chest during arterial phase injection IV contrast. 2D and 3D MIP reconstructions were performed by the CT technologist. Dose reduction techniques were used. CONTRAST: 80ml omni 350 FINDINGS: ANGIOGRAM CHEST: Respiratory motion, patient body habitus with increased image noise, and diminished opacification all within it exam sensitivity moderately. No evidence for pulmonary  embolism centrally. Nondiagnostic evaluation of the subsegmental level. Pulmonary arteries normal in caliber. Thoracic aorta normal in caliber. No aortic dissection or other acute abnormality. HEART: Cardiac chambers within normal limits. No pericardial effusion. LUNGS AND PLEURA: Marked elevation of the right hemidiaphragm. Near-complete right lower lobe atelectasis. Excessive respiratory motion. Diffuse interstitial edema. No pleural effusion or pneumothorax. MEDIASTINUM: No adenopathy or mass. LIMITED UPPER ABDOMEN: Colonic interposition. MUSCULOSKELETAL: Chronic left glenohumeral dislocation anteriorly with new prominent fragmentation, large chronic effusion.. Mild right glenohumeral subluxation anteriorly. No suspicious bone lesion. Please see recent MRI left shoulder from 11/22/2019.     1.  No evidence for central pulmonary embolism. Limited peripheral evaluation as described. 2.  Diffuse interstitial edema correlate for CHF. 3.  Marked elevation the right hemidiaphragm with partial right lower lobe atelectasis.    Us Venous Legs Bilateral    Result Date: 12/9/2019  EXAM: US VENOUS LEGS BILATERAL LOCATION: [facility] DATE/TIME: 12/9/2019 8:17 PM INDICATION: LOWER EXT EDEMA COMPARISON: None. TECHNIQUE: Venous Duplex ultrasound of bilateral lower extremities with and without compression, augmentation and duplex. Color flow and spectral Doppler with waveform analysis performed. FINDINGS: Exam includes the common femoral, femoral, popliteal veins as well as segmentally visualized deep calf veins and greater saphenous vein. RIGHT: No deep vein thrombosis. No superficial thrombophlebitis. No popliteal cyst. LEFT: No deep vein thrombosis. No superficial thrombophlebitis. No popliteal cyst.     1.  No deep venous thrombosis in the bilateral lower extremities.    Us Abdomen Duplex Hepatic And Portal Vasculature Complete    Result Date: 12/11/2019  EXAM: US ABDOMEN HEPATIC AND PORTAL VASCULATURE COMPLETE  LOCATION: [facility] DATE/TIME: 12/11/2019 2:10 AM INDICATION: Abdominal distension, edema, check vessels. COMPARISON: None. TECHNIQUE: Hepatic ultrasound. Color flow with spectral Doppler and waveform analysis performed.  FINDINGS: There is appropriate directional flow in the main portal vein and right portal vein. The left portal vein is obscured by bowel gas. There is appropriate directional flow in the middle and right hepatic veins. The left hepatic vein is obscured. The hepatic artery is patent with a velocity of 80 cm/s. IVC is widely patent without thrombus. The splenic vein is obscured by bowel gas.     Appropriate directional flow in the hepatic vasculature.     Us Venous Arm Left    Result Date: 12/10/2019  EXAM: US VENOUS ARM LEFT LOCATION: [facility] DATE/TIME: 12/10/2019 4:17 PM INDICATION: Left arm swelling COMPARISON: None. TECHNIQUE: Venous Duplex ultrasound of the left upper extremity with (when possible) and without compression, augmentation, and duplex. Color flow and spectral Doppler with waveform analysis performed. FINDINGS: Ultrasound includes evaluation of the internal jugular vein, innominate vein, subclavian vein, axillary vein, and brachial vein. The superficial cephalic and basilic veins were also evaluated where seen. LEFT: No deep venous thrombosis. No superficial thrombophlebitis. Diffuse subcutaneous edema. Complex nonspecific fluid collection, presumed hematoma given patient's dislocation/fracture, extending over at least 6.3 x 3.3 x 2.2 cm.     1.  No deep venous thrombosis in the left upper extremity.            NICKOLAS Alvarado

## 2021-06-04 NOTE — TELEPHONE ENCOUNTER
[facility] Nurse Triage Telephone Note      Provider: KIMI Chamorro  Facility: [facility]    Facility Type: TCU    Caller: Dahlia  Call Back Number:  [phone]    Allergies: Lisinopril; Fosinopril sodium; Tramadol; Amoxicillin-pot clavulanate; Cephalosporins; and Penicillins    Reason for call: Nurse reporting Heme 2, BMP, BNP, and D-dimer results.  Nurse reporting that patient was noted to have slurred speech this morning and staff left a note for NP to see patient today.  Patient was assessed by NP this morning who said patient did not go to bed until 5am today, so she felt this was due to being very tired from being up all night.  Today about 3:30pm, the nurse checked his VS and they were:  T=91.9 tympanic and 91.0 orally, P=70, R=19, SD=735/68, O2=98%RA.  Those nurse also noticed that it was taking him a while to get his words out and that he was very pale.      Verbal Order/Direction given by Provider: Send to  due to hypothermia, elevated D-dimer, and slurred speech.      Provider giving order: KIMI Chamorro    Verbal order given to: Dahlia Omalley RN

## 2021-06-04 NOTE — TELEPHONE ENCOUNTER
----- Message from Grazyna Torrez sent at 12/27/2019 11:55 AM CST -----  General phone call:    Caller: [facility]  Primary cardiologist: Sofiya  Detailed reason for call: Patient will be seeing Sofiya soon(he has not seen her it will be his first appointment) he has gained 6 pounds from 12/21 to current weight gain  New or active symptoms? Weight gain  Best phone number: [phone] Tamela  Best time to contact: anytime  Ok to leave a detailed message? Yes  Device? no  Additional Info:     Pt has not seen DB in clinic yet. Call made out to Tamela at [facility]. Left detailed message for Tamela advising Rashad to be seen sooner than 1/2 to assess weight gain. Informed her of 12/30 appointment open with DB.

## 2021-06-05 NOTE — PROGRESS NOTES
"  [facility]      NAME:  Rashad Caputo             :  1950    MRN: [id number]    CODE STATUS:  FULL CODE    FACILITY: [facility] [[id number]]         CHIEF COMPLAIN/REASON FOR VISIT:  Chief Complaint   Patient presents with     Review Of Multiple Medical Conditions       HISTORY OF PRESENT ILLNESS: Rashad Caputo is a 69 y.o. male being seen per his request. He has concern of cerumen build up. Ears assessed and they do have impaction bilaterally. . He was on the TCU and sent to acute care due to gradual wt increase of 60 pounds or so. We had sent him in several times for lymphedema, but he was always sent back. But on day of dc from TCU, .Nurses reporedt that Rashad felt he was having a stroke and he couldn't speak other than garbled words. B/P  Pulse reviewed, stable. Speaking articulatley this am. He was up until 5 am and is sleepy, I suspect this is related to his speech change and fatigue. .I had ordered d dimer and it was elevated and we sent him in. He was finally admitted.He started out at [facility] then sent to [place], in from  to 2019. Per his dc summary from hospital \" presenting with hypothermia, sepsis secondary to probable catheter associated urinary tract infection, dysarthria, acute diastolic heart failure     Septic shock: Etiology presumed secondary to catheter associated urinary tract infection with urine culture MSSA.  Blood cultures x3 no growth to date.  Required ICU admission and initial vasopressor and broad-spectrum IV antibiotic support.  Current septic shock resolved.     Catheter associated UTI with sepsis: Urine culture MSSA.  Patient refused to have Valentine catheter exchanged for new one as he would like to follow-up as outpatient with his urologist.  I have placed an order for referral for [facility] for him to be seen in the clinic within 7 days time to have catheter exchanged out for new one.  Counseled patient on the " "importance of exchanging catheter and he acknowledged understanding was able to recite potential complications of not following recommendations of exchanging catheter out for a clean 1.  He still refused.  Patient will complete doxycycline as advised by infectious disease who have now signed off with last dose to be completed on 12/20/2019.\"  Wt at 213 today, continues with dietary non compliance but has CHF and on bumex.    Allergies   Allergen Reactions     Lisinopril Shortness Of Breath and Dizziness     Amoxicillin      Allergy is per [facility] records.     Fosinopril Sodium Other (See Comments) and Dizziness     Mouth numbness     Tramadol Other (See Comments)     sweating     Amoxicillin-Pot Clavulanate Hives and Rash     Cephalosporins Hives and Rash     Penicillins Rash   :     Current Outpatient Medications   Medication Sig     acetaminophen (TYLENOL) 500 MG tablet Take 1 tablet (500 mg total) by mouth every 4 (four) hours as needed for pain. Do not exceed 4000 mg in 24 hours.     aspirin 81 MG EC tablet Take 1 tablet (81 mg total) by mouth daily.     bumetanide (BUMEX) 1 MG tablet Take 1 tablet (1 mg total) by mouth daily.     carvedilol (COREG) 6.25 MG tablet Take 1 tablet (6.25 mg total) by mouth 2 (two) times a day.     cyclobenzaprine (FLEXERIL) 10 MG tablet Take 1 tablet (10 mg total) by mouth every 12 (twelve) hours as needed for muscle spasms.     diclofenac sodium (VOLTAREN) 1 % Gel Apply topically every 8 (eight) hours as needed (pain). Apply to arm/knee.     fluticasone propionate (FLONASE) 50 mcg/actuation nasal spray Apply 1 spray into each nostril 2 (two) times a day.     gabapentin (NEURONTIN) 100 MG capsule Take 200 mg by mouth 3 (three) times a day.     hydrocortisone (CORTEF) 5 MG tablet Take 1 tablet (5 mg total) by mouth 2 (two) times a day. Do not stop this medicine unless MD discontinues due to possible Adrenal Insufficiency Diagnosis     magnesium oxide (MAG-OX) 400 " mg (241.3 mg magnesium) tablet Take 400 mg by mouth 2 (two) times a day.      polyethylene glycol (MIRALAX) 17 gram packet Take 1 packet (17 g total) by mouth daily.     QUEtiapine (SEROQUEL) 50 MG tablet Take 50 mg by mouth at bedtime.     senna-docusate (PERICOLACE) 8.6-50 mg tablet Take 1 tablet by mouth 2 (two) times a day.     sodium chloride (OCEAN) 0.65 % nasal spray Apply 1 spray into each nostril every 4 (four) hours as needed for congestion. Use with phenylephrine spray.      tamsulosin (FLOMAX) 0.4 mg cap Take 0.4 mg by mouth Daily after breakfast.            thiamine 100 MG tablet Take 1 tablet (100 mg total) by mouth daily.         REVIEW OF SYSTEMS:    Currently, no fever, chills, or rigors. Does not have any visual or hearing problems. Denies any chest pain, headaches, palpitations, lightheadedness, dizziness, shortness of breath, or cough. Appetite is good. Denies any GERD symptoms. Denies any difficulty with swallowing, nausea, or vomiting.  Denies any abdominal pain,  with loose stools d/t prune juice. Denies any urinary symptoms other then retention,. No insomnia. No active bleeding . No rash. Pain to left arm has been stable      PHYSICAL EXAMINATION:  Vitals:    01/09/20 0554   BP: 112/71   Pulse: 90   Temp: 97.1  F (36.2  C)   Weight: 213 lb (96.6 kg)         GENERAL: Awake, Alert, oriented x3,unkempt in appearance,  not in any form of acute distress, answers questions appropriately, follows simple commands, conversant  HEENT: Head is normocephalic with normal hair distribution. No evidence of trauma. Ears: No acute purulent discharge, bilateral wax observed.. Eyes: Conjunctivae pink with no scleral jaundice. Nose: Normal mucosa and septum. NECK: Supple with no cervical or supraclavicular lymphadenopathy. Trachea is midline.   CHEST: No tenderness or deformity, no crepitus  LUNG: Clear to auscultation with good chest expansion. There are no crackles or wheezes, normal AP diameter.  BACK: No  kyphosis of the thoracic spine. Symmetric, no curvature, ROM normal, no CVA tenderness, no spinal tenderness   CVS: There is good S1  S2,  rhythm is regular.  ABDOMEN: Globular and soft, nontender to palpation, non distended, no masses, no organomegaly, good bowel sounds, no rebound or guarding, no peritoneal signs.   EXTREMITIES: ROM UE WNL, left arm with increasedSwelling, lymphedema to legs bilaterally, refer to therapy for lymph wraps.  SKIN: Warm and dry, no erythema noted, no rashes or lesions.  NEUROLOGICAL: The patient is oriented to person, place and time. Strength and sensation are grossly intact. Face is symmetric.          LABS:    Lab Results   Component Value Date    WBC 7.1 01/06/2020    HGB 9.8 (L) 01/06/2020    HCT 31.9 (L) 01/06/2020    MCV 82 01/06/2020     01/06/2020       Results for orders placed or performed in visit on 01/06/20   Basic Metabolic Panel   Result Value Ref Range    Sodium 139 136 - 145 mmol/L    Potassium 4.0 3.5 - 5.0 mmol/L    Chloride 101 98 - 107 mmol/L    CO2 31 22 - 31 mmol/L    Anion Gap, Calculation 7 5 - 18 mmol/L    Glucose 104 70 - 125 mg/dL    Calcium 9.2 8.5 - 10.5 mg/dL    BUN 24 (H) 8 - 22 mg/dL    Creatinine 0.65 (L) 0.70 - 1.30 mg/dL    GFR MDRD Af Amer >60 >60 mL/min/1.73m2    GFR MDRD Non Af Amer >60 >60 mL/min/1.73m2           Lab Results   Component Value Date    HGBA1C 5.3 12/11/2019     No results found for: [id number]  Lab Results   Component Value Date    [id number] 321 12/11/2019       ASSESSMENT/PLAN:  1. Acute diastolic CHF (congestive heart failure), NYHA class 3 (H)    2. Weight gain    3. Debilitated patient      1. Debilitated pt/has Lymphedema.: We will review therapy needs, debilitated and wreaked but basically debilitated at baseline. Discharge planning omgoing, will need LTC. Pt continues with therapy. Continues to refuse to wear tubi's or other compression.    2. Wt up again, back from hospital at 199, today at 213. See's cardio how  adjusted Bumex last week, we will increase to x1 extra dose, 1 mg po at boon today. He had a BMP done recently which was reviewed.    3. Debilitated pt, continues with therapy 3 x weekly. SW looking or LTC placement.    Electronically signed by:  Anuradha Barrett CNP  This progress note was completed using Dragon software and there may be grammatical errors.

## 2021-06-05 NOTE — PROGRESS NOTES
HISTORY OF PRESENT ILLNESS  Asked to see by ROLF Barrett for evaluation of cerumen impaction. I reviewed the nose from ROLF Barrett which references bilateral cerumen impaction. Patient refused debrox and ear irrigation. Patient asked for referral to ENT for ear cleaning. Patient reports that his ears are plugged and hearing decreased as a result     REVIEW OF SYSTEMS  Review of Systems: a 10-system review was performed. Pertinent positives are noted in the HPI and on a separate scanned document in the chart.    PMH, PSH, FH and SH has documented in the EHR.      EXAM    CONSTITUTIONAL  General Appearance:  Normal, well developed, well nourished, no obvious distress  Ability to Communicate:  communicates appropriately.    HEAD AND FACE  Appearance and Symmetry:  Normal, no scalp or facial scarring or suspicious lesions.    EARS  Clinical speech reception threshold:  Normal, able to hear normal speech.  Auricle:  Normal, Auricles without scars, lesions, masses.  External auditory canal:  Bilateral cerumen impaction debrided under otomicroscopy using microdissection technique.  Tympanic membrane:  Normal, Tympanic membranes normal without swelling or erythema.    NOSE (speculum or scope)  Architecture:  Normal, Grossly normal external nasal architecture with no masses or lesions.    NEUROLOGICAL  Speech pattern:  Normal, Proasaic    RESPIRATORY  Symmetry and Respiratory effort:  Normal, Symmetric chest movement and expansion with no increased intercostal retractions or use of accessory muscles.     IMPRESSION  Bilateral cerumen impaction    RECOMMENDATION  Follow up as needed.    Johnny Ayon MD

## 2021-06-05 NOTE — PROGRESS NOTES
"  [facility]      NAME:  Rashad Caputo             :  1950    MRN: [id number]    CODE STATUS:  FULL CODE    FACILITY: [facility] [[id number]]         CHIEF COMPLAIN/REASON FOR VISIT:  Chief Complaint   Patient presents with     Review Of Multiple Medical Conditions       HISTORY OF PRESENT ILLNESS: Rashad Caputo is a 69 y.o. male being seen per his request. He has concern of cerumen build up. Ears assessed and they do have impaction bilaterally. . He was on the TCU and sent to acute care due to gradual wt increase of 60 pounds or so. We had sent him in several times for lymphedema, but he was always sent back. But on day of dc from TCU, .Nurses reporedt that Rashad felt he was having a stroke and he couldn't speak other than garbled words. B/P  Pulse reviewed, stable. Speaking articulatley this am. He was up until 5 am and is sleepy, I suspect this is related to his speech change and fatigue. .I had ordered d dimer and it was elevated and we sent him in. He was finally admitted.He started out at [facility] then sent to [place], in from  to 2019. Per his dc summary from hospital \" presenting with hypothermia, sepsis secondary to probable catheter associated urinary tract infection, dysarthria, acute diastolic heart failure     Septic shock: Etiology presumed secondary to catheter associated urinary tract infection with urine culture MSSA.  Blood cultures x3 no growth to date.  Required ICU admission and initial vasopressor and broad-spectrum IV antibiotic support.  Current septic shock resolved.     Catheter associated UTI with sepsis: Urine culture MSSA.  Patient refused to have Valentine catheter exchanged for new one as he would like to follow-up as outpatient with his urologist.  I have placed an order for referral for [place] [facility] for him to be seen in the clinic within 7 days time to have catheter exchanged out for new one.  Counseled patient on the " "importance of exchanging catheter and he acknowledged understanding was able to recite potential complications of not following recommendations of exchanging catheter out for a clean 1.  He still refused.  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 Ears assessed today.    Allergies   Allergen Reactions     Lisinopril Shortness Of Breath and Dizziness     Amoxicillin      Allergy is per  [facility] records.     Fosinopril Sodium Other (See Comments) and Dizziness     Mouth numbness     Tramadol Other (See Comments)     sweating     Amoxicillin-Pot Clavulanate Hives and Rash     Cephalosporins Hives and Rash     Penicillins Rash   :     Current Outpatient Medications   Medication Sig     acetaminophen (TYLENOL) 500 MG tablet Take 1 tablet (500 mg total) by mouth every 4 (four) hours as needed for pain. Do not exceed 4000 mg in 24 hours.     aspirin 81 MG EC tablet Take 1 tablet (81 mg total) by mouth daily.     bumetanide (BUMEX) 1 MG tablet Take 1 tablet (1 mg total) by mouth daily.     carvedilol (COREG) 6.25 MG tablet Take 1 tablet (6.25 mg total) by mouth 2 (two) times a day.     cyclobenzaprine (FLEXERIL) 10 MG tablet Take 1 tablet (10 mg total) by mouth every 12 (twelve) hours as needed for muscle spasms.     diclofenac sodium (VOLTAREN) 1 % Gel Apply topically every 8 (eight) hours as needed (pain). Apply to arm/knee.     fluticasone propionate (FLONASE) 50 mcg/actuation nasal spray Apply 1 spray into each nostril 2 (two) times a day.     gabapentin (NEURONTIN) 100 MG capsule Take 200 mg by mouth 3 (three) times a day.     hydrocortisone (CORTEF) 5 MG tablet Take 1 tablet (5 mg total) by mouth 2 (two) times a day. Do not stop this medicine unless MD discontinues due to possible Adrenal Insufficiency Diagnosis     magnesium oxide " (MAG-OX) 400 mg (241.3 mg magnesium) tablet Take 400 mg by mouth 2 (two) times a day.      polyethylene glycol (MIRALAX) 17 gram packet Take 1 packet (17 g total) by mouth daily.     QUEtiapine (SEROQUEL) 50 MG tablet Take 50 mg by mouth at bedtime.     senna-docusate (PERICOLACE) 8.6-50 mg tablet Take 1 tablet by mouth 2 (two) times a day.     sodium chloride (OCEAN) 0.65 % nasal spray Apply 1 spray into each nostril every 4 (four) hours as needed for congestion. Use with phenylephrine spray.      tamsulosin (FLOMAX) 0.4 mg cap Take 0.4 mg by mouth Daily after breakfast.            thiamine 100 MG tablet Take 1 tablet (100 mg total) by mouth daily.         REVIEW OF SYSTEMS:    Currently, no fever, chills, or rigors. Does not have any visual or hearing problems. Denies any chest pain, headaches, palpitations, lightheadedness, dizziness, shortness of breath, or cough. Appetite is good. Denies any GERD symptoms. Denies any difficulty with swallowing, nausea, or vomiting.  Denies any abdominal pain,  with loose stools d/t prune juice. Denies any urinary symptoms other then retention,. No insomnia. No active bleeding . No rash. Pain to left arm has been stable      PHYSICAL EXAMINATION:  Vitals:    01/06/20 2108   BP: 137/80   Pulse: 82   Temp: 96.8  F (36  C)   Weight: 211 lb 3.2 oz (95.8 kg)         GENERAL: Awake, Alert, oriented x3,unkempt in appearance,  not in any form of acute distress, answers questions appropriately, follows simple commands, conversant  HEENT: Head is normocephalic with normal hair distribution. No evidence of trauma. Ears: No acute purulent discharge, bilateral wax observed.. Eyes: Conjunctivae pink with no scleral jaundice. Nose: Normal mucosa and septum. NECK: Supple with no cervical or supraclavicular lymphadenopathy. Trachea is midline.   CHEST: No tenderness or deformity, no crepitus  LUNG: Clear to auscultation with good chest expansion. There are no crackles or wheezes, normal AP  diameter.  BACK: No kyphosis of the thoracic spine. Symmetric, no curvature, ROM normal, no CVA tenderness, no spinal tenderness   CVS: There is good S1  S2,  rhythm is regular.  ABDOMEN: Globular and soft, nontender to palpation, non distended, no masses, no organomegaly, good bowel sounds, no rebound or guarding, no peritoneal signs.   EXTREMITIES: ROM UE WNL, left arm with increasedSwelling, lymphedema to legs bilaterally, refer to therapy for lymph wraps.  SKIN: Warm and dry, no erythema noted, no rashes or lesions.  NEUROLOGICAL: The patient is oriented to person, place and time. Strength and sensation are grossly intact. Face is symmetric.          LABS:    Lab Results   Component Value Date    WBC 7.1 01/06/2020    HGB 9.8 (L) 01/06/2020    HCT 31.9 (L) 01/06/2020    MCV 82 01/06/2020     01/06/2020       Results for orders placed or performed in visit on 01/06/20   Basic Metabolic Panel   Result Value Ref Range    Sodium 139 136 - 145 mmol/L    Potassium 4.0 3.5 - 5.0 mmol/L    Chloride 101 98 - 107 mmol/L    CO2 31 22 - 31 mmol/L    Anion Gap, Calculation 7 5 - 18 mmol/L    Glucose 104 70 - 125 mg/dL    Calcium 9.2 8.5 - 10.5 mg/dL    BUN 24 (H) 8 - 22 mg/dL    Creatinine 0.65 (L) 0.70 - 1.30 mg/dL    GFR MDRD Af Amer >60 >60 mL/min/1.73m2    GFR MDRD Non Af Amer >60 >60 mL/min/1.73m2           Lab Results   Component Value Date    HGBA1C 5.3 12/11/2019     No results found for: [id number]  Lab Results   Component Value Date    JUBYNYVH08 321 12/11/2019       ASSESSMENT/PLAN:  1. Lymphedema    2. Bilateral impacted cerumen      1. Debilitated pt/has Lymphedema.: We will review therapy needs, debilitated and wreaked but basically debilitated at baseline. Discharge planning omgoing, will need LTC. Pt continues with therapy. Continues to refuse to wear tubi's or other compression.    2. Bilateral cerumen of ears: Right ear impacted, left ear also with soft tan cerumen, offered to use debrox. Pt  refused. He requested ENT referral as he does nott want nurses to flush, referral made.        Electronically signed by:  Anuradha Barrett CNP  This progress note was completed using Dragon software and there may be grammatical errors.

## 2021-06-05 NOTE — TELEPHONE ENCOUNTER
[facility] Nurse Triage Telephone Note      Provider: KIMI Chamorro  Facility: [facility]    Facility Type: TCU    Caller: Tamela  Call Back Number:  [phone]    Allergies: Lisinopril; Amoxicillin; Fosinopril sodium; Tramadol; Amoxicillin-pot clavulanate; Cephalosporins; and Penicillins    Reason for call: Nurse reporting Heme 2 and BMP results.       Verbal Order/Direction given by Provider: No new orders.      Provider giving order: KIMI Chamorro    Verbal order given to: Scott Omalley RN

## 2021-06-05 NOTE — PROGRESS NOTES
"  [facility]      NAME:  Rashad Caputo             :  1950    MRN: [id number]    CODE STATUS:  FULL CODE    FACILITY: [facility] [[id number]]         CHIEF COMPLAIN/REASON FOR VISIT:  Chief Complaint   Patient presents with     Review Of Multiple Medical Conditions       HISTORY OF PRESENT ILLNESS: Rashad Caputo is a 69 y.o. male being seen for review of multiple medical conditions. He is seen up in , his primary mode of transporatation, after breakfast today. He was on the TCU and sent to acute care due to gradual wt increase of 60 pounds or so. We had sent him in several times for lymphedema, but he was always sent back. But on day of dc from TCU,he had a ADELINE and .He started out at [facility] then sent to [place], in from  to 2019. Per his dc summary from hospital \" presenting with hypothermia, sepsis secondary to probable catheter associated urinary tract infection, dysarthria, acute diastolic heart failure     Septic shock: Etiology presumed secondary to catheter associated urinary tract infection with urine culture MSSA.  Blood cultures x3 no growth to date.  Required ICU admission and initial vasopressor and broad-spectrum IV antibiotic support.  Current septic shock resolved.     Catheter associated UTI with sepsis: Urine culture MSSA.  Patient refused to have Valentine catheter exchanged for new one as he would like to follow-up as outpatient with his urologist.  I have placed an order for referral for [place] [facility] for him to be seen in the clinic within 7 days time to have catheter exchanged out for new one.  Counseled patient on the importance of exchanging catheter and he acknowledged understanding was able to recite potential complications of not following recommendations of exchanging catheter out for a clean 1.  He still refused.  Patient will complete doxycycline as advised by infectious disease who have now signed off with last dose to " "be completed on 12/20/2019.\"  Wt at 210 today, continues with dietary non compliance but has CHF and on bumex.Reports good appetite and bowels active.    Allergies   Allergen Reactions     Lisinopril Shortness Of Breath and Dizziness     Amoxicillin      Allergy is per [facility] records.     Fosinopril Sodium Other (See Comments) and Dizziness     Mouth numbness     Tramadol Other (See Comments)     sweating     Amoxicillin-Pot Clavulanate Hives and Rash     Cephalosporins Hives and Rash     Penicillins Rash   :     Current Outpatient Medications   Medication Sig     acetaminophen (TYLENOL) 500 MG tablet Take 1 tablet (500 mg total) by mouth every 4 (four) hours as needed for pain. Do not exceed 4000 mg in 24 hours.     aspirin 81 MG EC tablet Take 1 tablet (81 mg total) by mouth daily.     bumetanide (BUMEX) 1 MG tablet Take 1 tablet (1 mg total) by mouth daily.     carvedilol (COREG) 6.25 MG tablet Take 1 tablet (6.25 mg total) by mouth 2 (two) times a day.     cyclobenzaprine (FLEXERIL) 10 MG tablet Take 1 tablet (10 mg total) by mouth every 12 (twelve) hours as needed for muscle spasms.     diclofenac sodium (VOLTAREN) 1 % Gel Apply topically every 8 (eight) hours as needed (pain). Apply to arm/knee.     fluticasone propionate (FLONASE) 50 mcg/actuation nasal spray Apply 1 spray into each nostril 2 (two) times a day.     gabapentin (NEURONTIN) 100 MG capsule Take 200 mg by mouth 3 (three) times a day.     hydrocortisone (CORTEF) 5 MG tablet Take 1 tablet (5 mg total) by mouth 2 (two) times a day. Do not stop this medicine unless MD discontinues due to possible Adrenal Insufficiency Diagnosis     magnesium oxide (MAG-OX) 400 mg (241.3 mg magnesium) tablet Take 400 mg by mouth 2 (two) times a day.      polyethylene glycol (MIRALAX) 17 gram packet Take 1 packet (17 g total) by mouth daily.     QUEtiapine (SEROQUEL) 50 MG tablet Take 50 mg by mouth at bedtime.     senna-docusate (PERICOLACE) " 8.6-50 mg tablet Take 1 tablet by mouth 2 (two) times a day.     sodium chloride (OCEAN) 0.65 % nasal spray Apply 1 spray into each nostril every 4 (four) hours as needed for congestion. Use with phenylephrine spray.      tamsulosin (FLOMAX) 0.4 mg cap Take 0.4 mg by mouth Daily after breakfast.            thiamine 100 MG tablet Take 1 tablet (100 mg total) by mouth daily.         REVIEW OF SYSTEMS:    Currently, no fever, chills, or rigors. Does not have any visual or hearing problems. Denies any chest pain, headaches, palpitations, lightheadedness, dizziness, shortness of breath, or cough. Appetite is good. Denies any GERD symptoms. Denies any difficulty with swallowing, nausea, or vomiting.  Denies any abdominal pain,  with loose stools d/t prune juice. Denies any urinary symptoms other then retention,. No insomnia. No active bleeding . No rash. Pain to left arm has been stable      PHYSICAL EXAMINATION:  Vitals:    01/13/20 1835   BP: 151/81   Pulse: 67   Temp: 97.4  F (36.3  C)   Weight: 210 lb (95.3 kg)         GENERAL: Awake, Alert, oriented x3,unkempt in appearance,  not in any form of acute distress, answers questions appropriately, follows simple commands, conversant  HEENT: Head is normocephalic with normal hair distribution. No evidence of trauma. Ears: No acute purulent discharge, bilateral wax observed.. Eyes: Conjunctivae pink with no scleral jaundice. Nose: Normal mucosa and septum. NECK: Supple with no cervical or supraclavicular lymphadenopathy. Trachea is midline.   CHEST: No tenderness or deformity, no crepitus  LUNG: Clear to auscultation with good chest expansion. There are no crackles or wheezes, normal AP diameter.  BACK: No kyphosis of the thoracic spine. Symmetric, no curvature, ROM normal, no CVA tenderness, no spinal tenderness   CVS: There is good S1  S2,  rhythm is regular.  ABDOMEN: Globular and soft, nontender to palpation, non distended, no masses, no organomegaly, good bowel sounds,  no rebound or guarding, no peritoneal signs.   EXTREMITIES: ROM UE WNL, left arm with increasedSwelling, lymphedema to legs bilaterally, refer to therapy for lymph wraps.  SKIN: Warm and dry, no erythema noted, no rashes or lesions.  NEUROLOGICAL: The patient is oriented to person, place and time. Strength and sensation are grossly intact. Face is symmetric.          LABS:    Lab Results   Component Value Date    WBC 7.1 01/06/2020    HGB 9.8 (L) 01/06/2020    HCT 31.9 (L) 01/06/2020    MCV 82 01/06/2020     01/06/2020       Results for orders placed or performed in visit on 01/06/20   Basic Metabolic Panel   Result Value Ref Range    Sodium 139 136 - 145 mmol/L    Potassium 4.0 3.5 - 5.0 mmol/L    Chloride 101 98 - 107 mmol/L    CO2 31 22 - 31 mmol/L    Anion Gap, Calculation 7 5 - 18 mmol/L    Glucose 104 70 - 125 mg/dL    Calcium 9.2 8.5 - 10.5 mg/dL    BUN 24 (H) 8 - 22 mg/dL    Creatinine 0.65 (L) 0.70 - 1.30 mg/dL    GFR MDRD Af Amer >60 >60 mL/min/1.73m2    GFR MDRD Non Af Amer >60 >60 mL/min/1.73m2           Lab Results   Component Value Date    HGBA1C 5.3 12/11/2019     No results found for: [id number]  Lab Results   Component Value Date    [id number] 321 12/11/2019       ASSESSMENT/PLAN:  1. Debilitated patient    2. Urinary retention      1. Debilitated pt/has Lymphedema.: We will review therapy needs, debilitated and wreaked but basically debilitated at baseline. Discharge planning omgoing, will need LTC possibly AL placement. SW continues to assist with DC planning    2. Urinary retention: Valentine in place, has not had voiding trial, followed by .    Electronically signed by:  Anuradha Barrett CNP  This progress note was completed using Dragon software and there may be grammatical errors.

## 2021-06-07 NOTE — ANESTHESIA PROCEDURE NOTES
Emergent Intubation    Date/Time: 4/24/2020 5:35 PM    CRNA: Javid Rodas, CRNA  Indications: respiratory distress and respiratory failure    Medications Administered  Etomidate (AMIDATE) injection, 16 mgMedication administration time:4/24/2020 5:35 PMRoute: oral  Nasal Laterality: right  Technique: fiberoptic assisted (EZ glide 3)  Tube size: 7.5 mm  Tube type: cuffed  Cuff inflated: yes  Level of Difficulty: 0ETT to lip: 24 cm  Tube secured with: ETT chen  SaO2 %: 99    Sign out given. CXR and sedation per primary care team.  Comments: EZ glide tube placed good color change. Tube removed while being secured. Ez glide tube placed good color change.

## 2021-06-10 NOTE — PROGRESS NOTES
HISTORY OF PRESENT ILLNESS  Patient with history of cerumen impaction. Comes in for debridement of both ears. He feels that the left is particularly plugged with decreased hearing and ringing.     REVIEW OF SYSTEMS  Review of Systems: a 10-system review was performed. Pertinent positives are noted in the HPI and on a separate scanned document in the chart.    PMH, PSH, FH and SH has documented in the EHR.      EXAM    CONSTITUTIONAL  General Appearance:  Normal, well developed, well nourished, no obvious distress  Ability to Communicate:  communicates appropriately.    HEAD AND FACE  Appearance and Symmetry:  Normal, no scalp or facial scarring or suspicious lesions.    EARS  Clinical speech reception threshold:  Normal, able to hear normal speech.  Auricle:  Normal, Auricles without scars, lesions, masses.  External auditory canal:  Bilateral cerumen impaction debrided under otomicroscopy using microdissection technique. Normal, External auditory canal normal.  Tympanic membrane:  Normal, Tympanic membranes normal without swelling or erythema.    NEUROLOGICAL  Speech pattern:  Normal, Proasaic    RESPIRATORY  Symmetry and Respiratory effort:  Normal, Symmetric chest movement and expansion with no increased intercostal retractions or use of accessory muscles.     IMPRESSION  Bilateral cerumen impaction.    RECOMMENDATION  Follow up as needed.     Johnny Ayon MD

## 2021-06-13 NOTE — PROGRESS NOTES
DATE OF SERVICE: 09/14/2017    SUBJECTIVE:  Rashad comes in today for multiple issues.  The first is blood pressure.   He was seen in emergency department for urinary tract infection a little over a  week ago and was noted to have elevated blood pressure.  He was given a prescription  for losartan, but he has not filled it and wants to know if he should fill it.  The  patient also takes metoprolol.  He was taking Septra and his urinary tract issues  have essentially subsided.      REVIEW OF SYSTEMS:  Negative for chest pain or shortness of breath or edema.    The patient has multiple urological issues including occasional urinary tract  infection.  He has a history of urethroplasty in the past and he follows up with  [facility].  He is scheduled to see them again in the very near future.  The  patient would like Uroxatral which he has used in the past for spasms.    The patient uses multiple medications including Afrin nasal spray.  He is addicted  to this and is unable to get off of the medication, although been advised to do so  many times.  He feels this probably other causing his elevated blood pressure.      Socially he is a former smoker, lives in the local area.    OBJECTIVE:  VITAL SIGNS:  Temperature is afebrile.  HEENT:  Normal.  NECK:  Supple.  LUNGS:  Clear to auscultation.  HEART:  Regular rhythm, normal S1, normal, S2.  ABDOMEN:  Soft, nontender, no masses.    EXTREMITIES:  There was no ankle edema noted.  SKIN:  Pink and dry.  PSYCH:  He was alert, oriented, conversant and good memory, insight and judgment.    ASSESSMENT:    1.  Hypertension.  2.  Bladder spasm with urgency.  3.  Rhinitis medicamentosa.  4.  BPH.    PLAN:  1.  Referral to urology.  Strongly encouraged him to follow up with urology  concerning his multiple issues including is UTI and his urgency.  2.  Start losartan as previously prescribed.  3.  Continue metoprolol.  4.  Recheck blood pressure in the office in 4 weeks.  5.   Uroxatral 10 mg daily prescribed until seen by urology.      MD araseli JAIME 09/14/2017 12:06:50  T 09/14/2017 15:12:38  R 09/14/2017 15:12:38  [id number]        cc:JESUS MAGALLON MD

## 2021-06-13 NOTE — PROGRESS NOTES
Addendum: As patient was departing the building he was getting ready to exit the door to the outside when he had a mechanical fall.  He stated at the front legs of his walker caught on the floor mat and he tripped.  There is no loss of consciousness.  He sustained a mild abrasion on his nose which was easily managed with bandages.  He had no chest pain or shortness of breath was stable in every respect.  After bringing him into the clinic in monitoring him for several minutes he wished to go home and was discharged.  Clinic staff responded promptly and accurately.

## 2021-06-15 PROBLEM — T48.5X5A RHINITIS MEDICAMENTOSA: Status: ACTIVE | Noted: 2017-02-23

## 2021-06-15 PROBLEM — N40.1 BENIGN PROSTATIC HYPERPLASIA WITH LOWER URINARY TRACT SYMPTOMS: Chronic | Status: ACTIVE | Noted: 2017-02-23

## 2021-06-15 PROBLEM — J31.0 RHINITIS MEDICAMENTOSA: Status: ACTIVE | Noted: 2017-02-23

## 2021-06-16 PROBLEM — R53.81 DEBILITATED PATIENT: Status: ACTIVE | Noted: 2019-09-18

## 2021-06-16 PROBLEM — R62.7 FAILURE TO THRIVE IN ADULT: Chronic | Status: ACTIVE | Noted: 2019-10-14

## 2021-06-16 PROBLEM — N13.9 URINARY OBSTRUCTION: Status: ACTIVE | Noted: 2019-08-27

## 2021-06-16 PROBLEM — J96.22 ACUTE ON CHRONIC RESPIRATORY FAILURE WITH HYPOXIA AND HYPERCAPNIA (H): Status: ACTIVE | Noted: 2021-01-06

## 2021-06-16 PROBLEM — J69.0 ASPIRATION PNEUMONIA DUE TO GASTRIC SECRETIONS (H): Status: ACTIVE | Noted: 2021-01-06

## 2021-06-16 PROBLEM — Z91.199 H/O NONCOMPLIANCE WITH MEDICAL TREATMENT, PRESENTING HAZARDS TO HEALTH: Status: ACTIVE | Noted: 2019-10-14

## 2021-06-16 PROBLEM — J96.01 ACUTE RESPIRATORY FAILURE WITH HYPOXIA AND HYPERCAPNIA (H): Status: ACTIVE | Noted: 2020-04-24

## 2021-06-16 PROBLEM — Z97.8 CHRONIC INDWELLING FOLEY CATHETER: Chronic | Status: ACTIVE | Noted: 2020-02-29

## 2021-06-16 PROBLEM — N30.00 ACUTE CYSTITIS WITHOUT HEMATURIA: Status: ACTIVE | Noted: 2021-01-06

## 2021-06-16 PROBLEM — I50.31 ACUTE DIASTOLIC CHF (CONGESTIVE HEART FAILURE), NYHA CLASS 3 (H): Status: ACTIVE | Noted: 2019-12-10

## 2021-06-16 PROBLEM — R41.89 COGNITIVE IMPAIRMENT: Status: ACTIVE | Noted: 2019-10-14

## 2021-06-16 PROBLEM — N39.0 UTI (URINARY TRACT INFECTION): Status: ACTIVE | Noted: 2020-04-24

## 2021-06-16 PROBLEM — T17.908A ASPIRATION INTO AIRWAY, INITIAL ENCOUNTER: Status: ACTIVE | Noted: 2021-01-06

## 2021-06-16 PROBLEM — I89.0 LYMPHEDEMA: Status: ACTIVE | Noted: 2019-10-14

## 2021-06-16 PROBLEM — R60.1 ANASARCA: Status: ACTIVE | Noted: 2020-02-29

## 2021-06-16 PROBLEM — I50.32 CHRONIC DIASTOLIC CONGESTIVE HEART FAILURE (H): Status: ACTIVE | Noted: 2020-04-24

## 2021-06-16 PROBLEM — E27.40 HYPOADRENALISM (H): Status: ACTIVE | Noted: 2020-02-29

## 2021-06-16 PROBLEM — R33.9 URINARY RETENTION: Status: ACTIVE | Noted: 2019-08-27

## 2021-06-16 PROBLEM — J96.21 ACUTE ON CHRONIC RESPIRATORY FAILURE WITH HYPOXIA AND HYPERCAPNIA (H): Status: ACTIVE | Noted: 2021-01-06

## 2021-06-16 PROBLEM — T68.XXXA HYPOTHERMIA: Status: ACTIVE | Noted: 2020-02-29

## 2021-06-16 PROBLEM — J18.9 HOSPITAL-ACQUIRED PNEUMONIA: Status: ACTIVE | Noted: 2021-02-17
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
## 2021-06-16 PROBLEM — M79.89 LEFT ARM SWELLING: Status: ACTIVE | Noted: 2019-11-01

## 2021-06-16 PROBLEM — J96.02 ACUTE RESPIRATORY FAILURE WITH HYPOXIA AND HYPERCAPNIA (H): Status: ACTIVE | Noted: 2020-04-24

## 2021-06-17 NOTE — PROGRESS NOTES
At the request of Dr. Louis, this RN met with pt to discuss home situation and future plans.     Pt lives in a split level house with 2 adult children. Wife is currently at [facility]. Pt states she has left facilities AMA in the past. He states he and his wife argue a lot.    Offered to help pt explore community resources but he declined. States he is too jittery from prednisone and can't think.     Plan:    Pt has an appt with Dr Louis 4/3, pt prefers to meet with RN after that OV.     This RN wrote down examples of ADLs/IADLS and asked him to think about how he manages these skills, to prepare for the next meeting. Pt verbalizes understanding and agrees with plan.     Took pt to specialty  for help finding a mental health provider.  Isamar Logan RN Care Manager, Population Health

## 2021-06-17 NOTE — PROGRESS NOTES
"Chief Complaint   Patient presents with     Follow Up     has not seen urology yet, left sample this morning       HPI: Rashad presents today to discuss his chronic medical issues.  He has UTI symptoms of improved.  He has no dysuria or urinary frequency.  He has been on antibiotics for approximately 1 week.    His rhinitis meta Mentoza continues and he is actively trying to decrease his Afrin use.  He continues to use Afrin in the office today with me.    His pressure ulcers are improved and he is using Neosporin.  He is reluctant to have those inspected today.    He is scheduled to see a psychologist on 26 April and he continues to suffer stress due to the care of his wife.    ROS: No dysuria.  No fever chills.    SH: The Patient's  reports that he quit smoking about 33 years ago. He does not have any smokeless tobacco history on file. He reports that he does not drink alcohol or use illicit drugs.      FH: The Patient's family history is not on file.     Meds:  Rashad has a current medication list which includes the following prescription(s): alfuzosin, alfuzosin, fluticasone, losartan, metoprolol succinate, ondansetron, and sodium chloride.    O:  /78  Pulse 89  Temp 97.8  F (36.6  C)  Resp 18  Ht 5' 5\" (1.651 m)  Wt 195 lb 5 oz (88.6 kg)  SpO2 98%  BMI 32.5 kg/m2  Alert conversant no acute distress  Skin Pink and dry  No bladder tenderness to palpation  UA shows no leukocyte esterase or evidence of infection with only  protein.    A/P:   1. Frequency of urination  -UTI resolved.  Continue fluids as able    2.  Pressure ulcer  -Continue Neosporin    3.  Afrin addiction  -Recommended discontinuation Afrin follow-up with ENT                                      "

## 2021-06-17 NOTE — PROGRESS NOTES
"Chief Complaint   Patient presents with     Urinary Tract Infection     frequent urination, hx of uti     Referral     psych     Knee Pain     Referral to have R knee replaced       HPI: Rashad comes in today with multiple issues.  The first is mild dysuria.  He has had UTIs in the past but has not followed up with urology.    His second concern is pain in his buttocks.  He sits in a chair quite a bit and he thinks that he has sores on his buttocks.    His blood pressures been elevated he has not been taking any of his medications.  His hypertension is poorly controlled.    He is concerned about his wife.  His wife is been taken to the [facility] and [facility].  He is ambulating with a walker, and has difficulty getting around but is yet concerned about her and not taking care of himself.    Patient is hooked on Afrin and has seen ENT in the past.    ROS: No chest pain.  No shortness of breath.  Positive for buttock pain.  Positive for mild dysuria.  No fever.    SH: The Patient's  reports that he quit smoking about 33 years ago. He does not have any smokeless tobacco history on file. He reports that he does not drink alcohol or use illicit drugs.     FH: The Patient's family history is not on file.    Meds:  Rashad has a current medication list which includes the following prescription(s): alfuzosin, alfuzosin, fluticasone, losartan, metoprolol succinate, ondansetron, sodium chloride, neomycin-bacitracin-polymyxin, and sulfamethoxazole-trimethoprim.    O:  BP (!) 160/100  Pulse 90  Resp 16  Ht 5' 10\" (1.778 m)  SpO2 98%     Lungs--Clear to Auscultation  Heart--Regular rate and rhythm  Abdomen--Soft, non-tender, non-distended  Skin-Pink and dry except for to grade 4 pressure sores on the buttocks one on the left and one on the right cheek.    A/P:   1. UTI (urinary tract infection)  - Urinalysis Macroscopic  - sulfamethoxazole-trimethoprim (BACTRIM DS) 800-160 mg per tablet; Take 1 tablet by mouth " 2 (two) times a day for 10 days.  Dispense: 20 tablet; Refill: 0    2. Rhinitis medicamentosa  -Patient has relationship with ENT and will agree to see ENT for follow-up    3. Hypertension  -Patient will resume blood pressure medication including losartan and metoprolol  - losartan (COZAAR) 100 MG tablet; Take 1 tablet (100 mg total) by mouth daily.  Dispense: 30 tablet; Refill: 0  - metoprolol succinate (TOPROL-XL) 100 MG 24 hr tablet; Take 1 tablet (100 mg total) by mouth daily.  Dispense: 30 tablet; Refill: 0    4. Pressure ulcer  - neomycin-bacitracin-polymyxin (NEOSPORIN) ointment; Apply to affected areas BID  Dispense: 56 g; Refill: 3    -Recommended offloading and donut for sitting    5. Stress at home  - Ambulatory referral to Psychology  -Patient most likely needs some type of institutionalization.  Our expert clinic staff was able to visit with him today and will help setting up his needs including most likely needing assisted living at the minimum.    Patient will return next Tuesday for recheck blood pressure recheck of pressure ulcers.

## 2021-06-19 NOTE — LETTER
Letter by Juana Le MBBS at      Author: Juana Le [employer] Service: -- Author Type: --    Filed:  Encounter Date: 11/19/2019 Status: Signed         Patient: Rashad Caputo   MR Number: [id number]   YOB: 1950   Date of Visit: 11/19/2019       [facility] Admission note      Patient: Rashad Caputo  MRN: [id number]  Date of Service: 11/19/2019      [facility] [[id number]]  Reason for Visit     Chief Complaint   Patient presents with   ? Review Of Multiple Medical Conditions       Code Status     Full code    Assessment     -Acute hemorrhagic cystitis with cultures growing more than 100,000 colonies of staph aureus  -Acute onset of left shoulder dislocation/anterior subluxation with no fractures been identified appears to be a long-standing condition  -Acute onset of left upper extremity swelling  -Acute back pain with underlying history of severe spinal stenoses/kyphoscoliosis with postural instability noted on exam  -Acute psychosocial stressors causing significant anxiety patient  -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  -Vulnerable adult  -Failure to thrive with difficulty functioning in his home environment.  Multiple ER visits noted with 6 emergency room visits noted     Plan     Patient has been admitted to the TCU.  Seen today at the request of the clinical psychologist along with the nurse manager.  He has been having breathing behavioral issues  He has become more paranoid and is reporting to staff that they are ignoring him or not taking care of his needs he has been using  abusive language towards staff.  In the past he has refused to take any psychotropic medications he had a prolonged discussion and today the psychologist informed us that he will take the medication and signed the consent he is strongly urging Rashad to take some Seroquel   will write Seroquel 50 mg at bedtime and see the response from that.  He had a follow-up with urology that he canceled because of pain issues.  They have repeatedly requested him to discontinue his Valentine catheter and his sister feels that both in the TCU and at the urology office  Pain was reviewed he is on scheduled naproxen along with Tylenol and gabapentin with uncontrolled pain reported mostly musculoskeletal  He has severe osteoarthritis of his back his knee is severely arthritic and deformed he has bilateral shoulder dislocations with edema  Lymph strap were discontinued due to noncompliance  Issues reviewed he understands he has bilateral anterior shoulder subluxations and they recommended follow-up MRI similarly his knee also needs a TKA.  He has not been able to keep any of these appointments as he needs an open MRI as per him.  Current pain regimen was reviewed with him  DC his Zanaflex  Increase gabapentin to 200 mg 3 times daily from 100 twice daily  Tramadol 50 mg every 6 hours as needed has been added to his regimen for additional pain relief  Discharge planning also reviewed he is still not sure if he wants to go to long-term care.  He is going for evaluation and a visit tomorrow.  Total time spent 35 minutes more than 25 minutes spent face-to-face talking with the patient reviewing mood and behavior issues including anxiety disorder and pain management as outlined in my note above  Care plan was also reviewed with nursing and his clinical psychologist .at his urging he has agreed to take Seroquel      History     Patient is a very pleasant 69 y.o. male who is admitted to TCU  Patient had acute onset of left upper extremity pain  along with swelling ongoing for a couple of days.  Unfortunately work-up in the TCU was nondiagnostic.  He did not have any DVT edema was pitting and nonresponsive to diuretics he was  Sent to the emergency room for further evaluation and a repeat upper extremity ultrasound did not show any DVT but a questionable left axillary mass.  Subsequently a CT showed an anterior subluxation of the left shoulder.  Orthopedics was consulted.  They have recommended outpatient follow-up with MRI of his left shoulder  He has bilateral anterior subluxation of both shoulders noted today left upper extremity remains painful    He also has an chronic indwelling Valentine catheter.  He was recently seen in the emergency room because of blood in his catheter he was diagnosed with hemorrhagic cystitis and given Levaquin.  He had a follow-up with urology which he chose not to attend.  He told me he did not go today because of pain issues urologist repeatedly requested a voiding trial but he is refusing    He has had progressive weight gain of more than 40 pounds with lymphedema he admits he has significant caloric restriction prior to coming to the TCU and has been overeating with excessive caloric intake.  Mood and behaviors were reviewed and he has significant issues with that he has been using abusive language towards staff he is getting more paranoid in the evening getting somewhat in verbal altercations with staff members.  He feels his care is being ignored because of this reason he is seeing psychology in the past he is reperfused to take any psychotropic medications to manage his mood behaviors and anxiety.    Past Medical History     Active Ambulatory (Non-Hospital) Problems    Diagnosis   ? Pain   ? Left arm swelling   ? Fluid overload   ? Lymphedema   ? Allergic rhinitis   ? H/O noncompliance with medical treatment, presenting hazards to health   ? Failure to thrive in adult   ? Cognitive impairment   ? Weight gain   ? Stool  incontinence   ? Insomnia   ? Discharge planning issues   ? Debilitated patient   ? Urinary retention   ? Urinary obstruction   ? Urinary tract infection   ? Benign prostatic hyperplasia with lower urinary tract symptoms   ? Post-void dribbling   ? Rhinitis medicamentosa   ? Sepsis due to urinary tract infection (H)   ? Sepsis (H)   ? CAD (coronary artery disease)   ? Non-STEMI (non-ST elevated myocardial infarction) (H)   ? Tachycardia   ? Anxiety   ? Spinal stenosis   ? Hypertension   ? Benign Prostatic Hypertrophy   ? Myalgia And Myositis   ? Urinary Tract Infection   ? Feelings Of Urinary Urgency   ? Joint Pain, Localized In The Hip     Past Medical History:   Diagnosis Date   ? Arthritis    ? Benign prostatic hyperplasia with lower urinary tract symptoms 2/23/2017   ? BPH (benign prostatic hypertrophy)    ? Coronary artery disease    ? History of transfusion 1975   ? Hypertension    ? Pneumonia 1980   ? Post-void dribbling 2/23/2017   ? Retinal tear of right eye    ? Rhinitis medicamentosa 2/23/2017   ? Spinal stenosis    ? Spinal stenosis    ? UTI (urinary tract infection)        Past Social History     Reviewed, and he  reports that he quit smoking about 34 years ago. He smoked 0.00 packs per day. He has never used smokeless tobacco. He reports that he does not drink alcohol or use drugs.    Family History     Reviewed, and includes cataracts and CVD in his father; his mother had dementia; grandmother had DM    Medication List     Current Outpatient Medications on File Prior to Visit   Medication Sig Dispense Refill   ? acetaminophen (TYLENOL) 325 MG tablet Take 650 mg by mouth 4 (four) times a day.     ? acetaminophen (TYLENOL) 500 MG tablet Take 500-1,000 mg by mouth every 4 (four) hours as needed for pain. Do not exceed 4000 mg in 24 hours     ? cyclobenzaprine (FLEXERIL) 10 MG tablet Take 1 tablet (10 mg total) by mouth 2 (two) times a day as needed for muscle spasms. 10 tablet 0   ? diclofenac sodium  (VOLTAREN) 1 % Gel Apply 1 g topically 3 (three) times a day. Apply to bilateral knees/hips     ? diclofenac sodium (VOLTAREN) 1 % Gel Apply 1 g topically as needed. To hips/knees/ as needed     ? fluticasone propionate (FLONASE) 50 mcg/actuation nasal spray Apply 1 spray into each nostril every 8 (eight) hours as needed. For overuse of nasal decongestant.            ? furosemide (LASIX) 20 MG tablet Take 40 mg by mouth daily.            ? gabapentin (NEURONTIN) 100 MG capsule Take 100 mg by mouth 2 (two) times a day.     ? magnesium oxide (MAG-OX) 400 mg (241.3 mg magnesium) tablet Take 400 mg by mouth daily.     ? naproxen sodium (ALEVE) 220 MG tablet Take 220 mg by mouth 2 (two) times a day with meals.            ? phenylephrine (NICOLE-SYNEPHRINE) 1 % Spry 1-2 sprays into each nostril every 4 (four) hours as needed.            ? polyethylene glycol (MIRALAX) 17 gram packet Take 1 packet (17 g total) by mouth daily.  0   ? potassium chloride (K-DUR,KLOR-CON) 20 MEQ tablet Take 20 mEq by mouth daily.     ? sodium chloride (OCEAN) 0.65 % nasal spray 1 spray into each nostril as needed for congestion. Use with phenylephrine spray.     ? tamsulosin (FLOMAX) 0.4 mg cap Take 0.4 mg by mouth Daily after breakfast.            ? tiZANidine (ZANAFLEX) 4 MG tablet Take 4 mg by mouth twice a day with lunch and supper.       No current facility-administered medications on file prior to visit.        Allergies     Allergies   Allergen Reactions   ? Lisinopril Shortness Of Breath and Dizziness   ? Fosinopril Sodium Other (See Comments) and Dizziness     Mouth numbness   ? Amoxicillin-Pot Clavulanate Hives and Rash   ? Cephalosporins Hives and Rash   ? Penicillins Rash       Review of Systems   A comprehensive review of 14 systems was done. Pertinent findings noted here and in history of present illness. All the rest negative.  Constitutional: Negative.  Negative for fever, chills, activity change, appetite change and fatigue  He  is having significant weight gain of almost 40 pounds since admission.   HENT: Negative for congestion and facial swelling.    Eyes: Negative for photophobia, redness and visual disturbance.   Respiratory: Negative for cough and chest tightness.    Cardiovascular: Negative for chest pain, palpitations and leg swelling.   Gastrointestinal: Negative for nausea, diarrhea, constipation, blood in stool and abdominal distention.   Genitourinary: Negative.    Musculoskeletal: Negative.    Skin: Negative.  Hyperpigmented rescaling noted in both of his hands.  Patient told me it is from chronic handwashing  Neurological: Negative for dizziness, tremors, syncope, weakness, light-headedness and headaches.   Hematological: Does not bruise/bleed easily.   Psychiatric/Behavioral: Negative.  Patient is reporting insomnia.  Mood and behaviors have shown some dysregulation with outbursts reported by staff      Physical Exam     Recent Vitals 11/19/2019   Weight 223 lbs 6 oz   BSA (m2) 2.22 m2   /81   Pulse 85   Temp 98.6   Temp src -   SpO2 -   Some recent data might be hidden   Recheck weight now is 221 pounds    Constitutional: Oriented to person, place, and time and appears well-developed.   Looks very disabled and deconditioned  HEENT:  Normocephalic and atraumatic.  Eyes: Conjunctivae and EOM are normal. Pupils are equal, round, and reactive to light. No discharge.  No scleral icterus. Nose normal. Mouth/Throat: Oropharynx is clear and moist. No oropharyngeal exudate.    NECK: Normal range of motion. Neck supple. No JVD present. No tracheal deviation present. No thyromegaly present.   CARDIOVASCULAR: Normal rate, regular rhythm and intact distal pulses.  Exam reveals no gallop and no friction rub.  Systolic murmur present.  PULMONARY: Effort normal and breath sounds normal. No respiratory distress.No Wheezing or rales.  ABDOMEN: Soft. Bowel sounds are normal. No distension and no mass.  There is no tenderness. There is no  rebound and no guarding. No HSM.  Open area noted on his right buttock which appears to be chronic with hyperpigmented changes noted with irritation on his buttocks  MUSCULOSKELETAL: Normal range of motion. 1+ edema and no tenderness. Mild kyphosis, no tenderness.  Right lower extremity is swollen with profound arthritic changes noted with swelling in his right knee.  On standing leg is crooked and patient is not able to stand up straight.  Profound kyphoscoliosis of his back also noted and he could not straighten himself up he was in severe pain  Left upper extremity is painful with limited range of movement and has 2+ pitting edema  Anterior subluxation of bilateral shoulder joints noted on exam  LYMPH NODES: Has no cervical, supraclavicular, axillary and groin adenopathy.   NEUROLOGICAL: Alert and oriented to person, place, and time. No cranial nerve deficit.  Normal muscle tone. Coordination normal.   GENITOURINARY: Deferred exam.  Valentine present  SKIN: Skin is warm and dry. No rash noted. No erythema. No pallor.   Hypopigmentary changes noted in his both fingers and hands which patient attributes to chronic handwashing  EXTREMITIES: No cyanosis, no clubbing,1+ edema. No Deformity.  PSYCHIATRIC: Normal mood, affect and behavior.  Has chronic anxiety      Lab Results       Lab Results   Component Value Date    ALBUMIN 4.0 02/15/2019    ALT 19 02/15/2019    AST 19 02/15/2019    BUN 39 (H) 11/05/2019    CALCIUM 8.9 11/05/2019     11/05/2019    CHOL 132 10/19/2014    CO2 25 11/05/2019    CREATININE 0.76 11/05/2019    GFRAA >60 11/05/2019    GFRNONAA >60 11/05/2019    GLU 90 11/05/2019    HCT 30.9 (L) 11/01/2019    WBC 8.7 11/01/2019    HGB 9.8 (L) 11/01/2019    MG 1.8 09/08/2019     11/01/2019    K 4.9 11/05/2019    PSA 4.5 02/15/2013     11/05/2019         Post Discharge Medication Reconciliation Status: discharge medications reconciled and changed, per note/orders (see AVS)      NICKOLAS Alvarado

## 2021-06-19 NOTE — LETTER
Letter by Anuradha Barrett CNP at      Author: Anuradha Barrett CNP Service: -- Author Type: --    Filed:  Encounter Date: 10/21/2019 Status: Signed         Patient: Rashad Caputo   MR Number: [id number]   YOB: 1950   Date of Visit: 10/21/2019       [facility]      NAME:  Rashad Caputo             :  1950    MRN: [id number]    CODE STATUS:  FULL CODE    FACILITY: [facility] [[id number]]         CHIEF COMPLAIN/REASON FOR VISIT:  Chief Complaint   Patient presents with   ? Review Of Multiple Medical Conditions       HISTORY OF PRESENT ILLNESS: Rashad Caputo is a 69 y.o. male being seen for review of multiple medical conditions. Seen today, up in  after planned therapy.  Patient presented to the hospital with acute urinary retention.  He had a Valentine catheter placed and currently discharged with an indwelling Valentine catheter.  He will follow with urology for a voiding trial. He was noted to have hematuria on , improved today. He developed a UTI cultures grew strep viridans.  He was given Levaquin 7 days post discharge.He has bowel issues of fecal incontinence, ongoing. Reports some pain to left knee, managed with analgesics.   He was to go to  for Folay removal today, but once again cancelled his apt. He was educated ( again) on risk benefits of prolonged catheter use. He reports to me that he promises to have it removed by next Tuesday.   He does continue with therapies and SN on TCU for management of chronic disease status as well as assist in ADLS and bladder care. Sw has been searching for dc planning as pt will require AL. Rashad reports overall feeling of well being with pain managed. OT is now following for potential lymph wraps , however susan vázquez have helped with LL edema    Allergies   Allergen Reactions   ? Lisinopril Shortness Of Breath and Dizziness   ? Fosinopril Sodium Other (See Comments) and Dizziness     Mouth numbness    ? Amoxicillin-Pot Clavulanate Hives and Rash   ? Cephalosporins Hives and Rash   ? Penicillins Rash   :     Current Outpatient Medications   Medication Sig   ? acetaminophen (TYLENOL) 500 MG tablet Take 1-2 tablets (500-1,000 mg total) by mouth every 4 (four) hours as needed.   ? cyclobenzaprine (FLEXERIL) 10 MG tablet Take 1 tablet (10 mg total) by mouth 2 (two) times a day as needed for muscle spasms.   ? fluticasone propionate (FLONASE) 50 mcg/actuation nasal spray Apply 1 spray into each nostril 3 (three) times a day as needed. For overuse of nasal decongestant.   ? furosemide (LASIX) 20 MG tablet Take 20 mg by mouth daily.   ? magnesium oxide (MAG-OX) 400 mg (241.3 mg magnesium) tablet Take 400 mg by mouth daily.   ? melatonin 3 mg Tab tablet Take 5 mg by mouth at bedtime as needed.   ? naproxen sodium (ALEVE) 220 MG tablet Take 220 mg by mouth at bedtime.   ? phenylephrine (NICOLE-SYNEPHRINE) 1 % Spry 1-2 sprays into each nostril every 4 (four) hours as needed.          ? polyethylene glycol (MIRALAX) 17 gram packet Take 1 packet (17 g total) by mouth daily.   ? sodium chloride (OCEAN) 0.65 % nasal spray 1 spray into each nostril as needed for congestion. Use with phenylephrine spray.   ? tamsulosin (FLOMAX) 0.4 mg cap Take 0.4 mg by mouth.         REVIEW OF SYSTEMS:    Currently, no fever, chills, or rigors. Does not have any visual or hearing problems. Denies any chest pain, headaches, palpitations, lightheadedness, dizziness, shortness of breath, or cough. Appetite is good. Denies any GERD symptoms. Denies any difficulty with swallowing, nausea, or vomiting.  Denies any abdominal pain, was constipated now with loose stools d/t prune juice. Denies any urinary symptoms other then retention,. No insomnia. No active bleeding other then ongoing hematuria. No rash.       PHYSICAL EXAMINATION:  Vitals:    10/21/19 1549   BP: 129/70   Pulse: 94   Temp: 96.8  F (36  C)   Weight: 218 lb 9.6 oz (99.2 kg)          GENERAL: Awake, Alert, oriented x3,unkempt in appearance,  not in any form of acute distress, answers questions appropriately, follows simple commands, conversant  HEENT: Head is normocephalic with normal hair distribution. No evidence of trauma. Ears: No acute purulent discharge. Eyes: Conjunctivae pink with no scleral jaundice. Nose: Normal mucosa and septum. NECK: Supple with no cervical or supraclavicular lymphadenopathy. Trachea is midline.   CHEST: No tenderness or deformity, no crepitus  LUNG: Clear to auscultation with good chest expansion. There are no crackles or wheezes, normal AP diameter.  BACK: No kyphosis of the thoracic spine. Symmetric, no curvature, ROM normal, no CVA tenderness, no spinal tenderness   CVS: There is good S1  S2,  rhythm is regular.  ABDOMEN: Globular and soft, nontender to palpation, non distended, no masses, no organomegaly, good bowel sounds, no rebound or guarding, no peritoneal signs.   EXTREMITIES: ROM UE WNL, he is unable to bear wt on legs. In wc. Pedal pulses present, pedal  edema, arthritic aged joint swelling, right knee very edematous. .Trace edema bilaterally  SKIN: Warm and dry, no erythema noted, no rashes or lesions.  NEUROLOGICAL: The patient is oriented to person, place and time. Strength and sensation are grossly intact. Face is symmetric.          LABS:    Lab Results   Component Value Date    WBC 8.6 10/10/2019    HGB 8.9 (L) 10/10/2019    HCT 28.8 (L) 10/10/2019    MCV 85 10/10/2019     10/10/2019       Results for orders placed or performed in visit on 10/17/19   Basic Metabolic Panel   Result Value Ref Range    Sodium 140 136 - 145 mmol/L    Potassium 4.6 3.5 - 5.0 mmol/L    Chloride 106 98 - 107 mmol/L    CO2 29 22 - 31 mmol/L    Anion Gap, Calculation 5 5 - 18 mmol/L    Glucose 97 70 - 125 mg/dL    Calcium 8.9 8.5 - 10.5 mg/dL    BUN 34 (H) 8 - 22 mg/dL    Creatinine 0.69 (L) 0.70 - 1.30 mg/dL    GFR MDRD Af Amer >60 >60 mL/min/1.73m2    GFR  MDRD Non Af Amer >60 >60 mL/min/1.73m2           No results found for: HGBA1C  No results found for: [id number]  No results found for: [id number]    ASSESSMENT/PLAN:  1. Failure to thrive in adult    2. Cognitive impairment    3. Lymphedema      1.Pt was a FTH prior to admission, now receiving ADL care as well as proper nutrient, gaining wt and thriving. DC planning ongoing and he needs a more structured living environment     2. Cognitive impairments: He is functioning well in a controlled enviroment, still impulsive at times, fall last week as not asking for help. Will require more structure on dc from TCU.     3. Lymphedema: He is now on lasix, was non compliant with Tubis, noted to be wearing this am. We did have OT look at for lymph wraps as well.    Electronically signed by:  Anuradha Barrett CNP  This progress note was completed using Dragon software and there may be grammatical errors.

## 2021-06-19 NOTE — LETTER
Letter by Anuradha Barrett CNP at      Author: Anuradha Barrett CNP Service: -- Author Type: --    Filed:  Encounter Date: 2019 Status: Signed         Patient: Rashad Caputo   MR Number: [id number]   YOB: 1950   Date of Visit: 2019       [facility]      NAME:  Rashad Caputo             :  1950    MRN: [id number]    CODE STATUS:  FULL CODE    FACILITY: [facility] [[id number]]         CHIEF COMPLAIN/REASON FOR VISIT:  Chief Complaint   Patient presents with   ? Review Of Multiple Medical Conditions       HISTORY OF PRESENT ILLNESS: Rashad Caputo is a 69 y.o. male being seen for review of multiple medical conditions.. Patient presented to the hospital with acute urinary retention.  He had a Valentine catheter placed and currently discharged with an indwelling Valentine catheter.  He will follow with urology for a voiding trial. He was noted to have hematuria on , improved today. He developed a UTI cultures grew strep viridans.  He was given Levaquin 7 days post dischargey. Needs f/u   Today he reports poor sleeping at night.    Allergies   Allergen Reactions   ? Lisinopril Shortness Of Breath and Dizziness   ? Fosinopril Sodium Other (See Comments) and Dizziness     Mouth numbness   ? Amoxicillin-Pot Clavulanate Hives and Rash   ? Cephalosporins Hives and Rash   ? Penicillins Rash   :     Current Outpatient Medications   Medication Sig   ? melatonin 3 mg Tab tablet Take 5 mg by mouth at bedtime as needed.   ? acetaminophen (TYLENOL) 500 MG tablet Take 1-2 tablets (500-1,000 mg total) by mouth every 4 (four) hours as needed.   ? cyclobenzaprine (FLEXERIL) 10 MG tablet Take 1 tablet (10 mg total) by mouth 2 (two) times a day as needed for muscle spasms.   ? fluticasone propionate (FLONASE) 50 mcg/actuation nasal spray Apply 1 spray into each nostril 3 (three) times a day as needed. For overuse of nasal decongestant.   ? magnesium oxide  (MAG-OX) 400 mg (241.3 mg magnesium) tablet Take 400 mg by mouth daily.   ? naproxen sodium (ALEVE) 220 MG tablet Take 220 mg by mouth at bedtime.   ? phenylephrine (NICOLE-SYNEPHRINE) 1 % Spry 1-2 sprays into each nostril every 4 (four) hours as needed.          ? polyethylene glycol (MIRALAX) 17 gram packet Take 1 packet (17 g total) by mouth daily.   ? sodium chloride (OCEAN) 0.65 % nasal spray 1 spray into each nostril as needed for congestion. Use with phenylephrine spray.         REVIEW OF SYSTEMS:    Currently, no fever, chills, or rigors. Does not have any visual or hearing problems. Denies any chest pain, headaches, palpitations, lightheadedness, dizziness, shortness of breath, or cough. Appetite is good. Denies any GERD symptoms. Denies any difficulty with swallowing, nausea, or vomiting.  Denies any abdominal pain, was constipated now with loose stools d/t prune juice. Denies any urinary symptoms other then retention,. hs insomnia. No active bleeding. No rash.       PHYSICAL EXAMINATION:  Vitals:    09/20/19 1832   BP: 132/62   Pulse: 87   Temp: 98.2  F (36.8  C)   Weight: 178 lb 12.8 oz (81.1 kg)         GENERAL: Awake, Alert, oriented x3,unkempt in appearance,  not in any form of acute distress, answers questions appropriately, follows simple commands, conversant  HEENT: Head is normocephalic with normal hair distribution. No evidence of trauma. Ears: No acute purulent discharge. Eyes: Conjunctivae pink with no scleral jaundice. Nose: Normal mucosa and septum. NECK: Supple with no cervical or supraclavicular lymphadenopathy. Trachea is midline.   CHEST: No tenderness or deformity, no crepitus  LUNG: Clear to auscultation with good chest expansion. There are no crackles or wheezes, normal AP diameter.  BACK: No kyphosis of the thoracic spine. Symmetric, no curvature, ROM normal, no CVA tenderness, no spinal tenderness   CVS: There is good S1  S2,  rhythm is regular.  ABDOMEN: Globular and soft, nontender to  palpation, non distended, no masses, no organomegaly, good bowel sounds, no rebound or guarding, no peritoneal signs.   EXTREMITIES: ROM UE WNL, he is unable to bear wt on legs. In wc. Pedal pulses present, no edema, arthritic aged joint swelling, right knee very edematous. .  SKIN: Warm and dry, no erythema noted, no rashes or lesions.  NEUROLOGICAL: The patient is oriented to person, place and time. Strength and sensation are grossly intact. Face is symmetric.                  LABS:    Lab Results   Component Value Date    WBC 7.3 09/08/2019    HGB 11.4 (L) 09/08/2019    HCT 35.2 (L) 09/08/2019    MCV 82 09/08/2019     09/08/2019       Results for orders placed or performed during the hospital encounter of 09/08/19   Basic Metabolic Panel   Result Value Ref Range    Sodium 136 136 - 145 mmol/L    Potassium 4.6 3.5 - 5.0 mmol/L    Chloride 104 98 - 107 mmol/L    CO2 25 22 - 31 mmol/L    Anion Gap, Calculation 7 5 - 18 mmol/L    Glucose 101 70 - 125 mg/dL    Calcium 9.3 8.5 - 10.5 mg/dL    BUN 22 8 - 22 mg/dL    Creatinine 0.65 (L) 0.70 - 1.30 mg/dL    GFR MDRD Af Amer >60 >60 mL/min/1.73m2    GFR MDRD Non Af Amer >60 >60 mL/min/1.73m2           No results found for: HGBA1C  No results found for: [id number]  No results found for: [id number]    ASSESSMENT/PLAN:  1. Urinary retention    2. Insomnia, unspecified type      1. Retention and. Urinary obstruction/ sepsis : Valentine in place, hematuria has disapated Encouraged fluid and request nursing assure he has a secure strap in place.H e has  F/U was cancelled. He is now completed ABX regime, we will recheck UA d/t hematuria    2.Insomnia : Start Melatonin 5 mg po  at bedtime.      Electronically signed by:  Anuradha Barrett CNP  This progress note was completed using Dragon software and there may be grammatical errors.      Attestation signed by Juana Le MBBS at 9/23/2019 11:51 AM:  Agree with discharge plan

## 2021-06-19 NOTE — LETTER
Letter by Britni Farrell CNP at      Author: Britni Farrell CNP Service: -- Author Type: --    Filed:  Encounter Date: 11/22/2019 Status: Signed         Patient: Rashad Caputo   MR Number: [id number]   YOB: 1950   Date of Visit: 11/22/2019     [facility]    Facility:   [facility] [[id number]]   Code Status: POLST AVAILABLE      CHIEF COMPLAINT/REASON FOR VISIT:  Chief Complaint   Patient presents with   ? Problem Visit     left arm edema       HISTORY:      HPI: Rashad is a 69 y.o. male who is currently at [facility] s/p hospitalization for acute rehabilitation.  Rashad  has a past medical history of Arthritis, Benign prostatic hyperplasia with lower urinary tract symptoms (2/23/2017), BPH (benign prostatic hypertrophy), Coronary artery disease, History of transfusion (1975), Hypertension, Pneumonia (1980), Post-void dribbling (2/23/2017), Retinal tear of right eye, Rhinitis medicamentosa (2/23/2017), Spinal stenosis, Spinal stenosis, and UTI (urinary tract infection).    Today Mr. Caputo is being evaluated per nursing staff request for increased edema in left arm.  Rashad denied pain in his arm but tenderness upon palpation at his shoulder joint.  Nursing staff noted that the patient has had lymphedema therapy over the past couple of weeks with no success and increased edema from +2 to +4 over the past couple of days.  He denied numbness and tingling in his fingers and feels that his sensation remains intact.  He has a mass on his upper arm likely related to edema that nursing staff have previously noted.  Previous evaluation has shown shoulder dislocation and it was recommended that he have a follow-up MRI that he has not had yet at this time.  The patient denied lightheadedness, dizziness, breathing difficulty, chest pain, palpitations, constipation, urinary symptoms, numbness or tingling in extremities, and pain.      Past Medical  History:   Diagnosis Date   ? Arthritis    ? Benign prostatic hyperplasia with lower urinary tract symptoms 2017   ? BPH (benign prostatic hypertrophy)    ? Coronary artery disease    ? History of transfusion    ? Hypertension    ? Pneumonia     had scar tissue, double pneumonia   ? Post-void dribbling 2017   ? Retinal tear of right eye    ? Rhinitis medicamentosa 2017   ? Spinal stenosis    ? Spinal stenosis    ? UTI (urinary tract infection)              No family history on file.  Social History     Socioeconomic History   ? Marital status:      Spouse name: Not on file   ? Number of children: Not on file   ? Years of education: Not on file   ? Highest education level: Not on file   Occupational History   ? Not on file   Social Needs   ? Financial resource strain: Not on file   ? Food insecurity:     Worry: Not on file     Inability: Not on file   ? Transportation needs:     Medical: Not on file     Non-medical: Not on file   Tobacco Use   ? Smoking status: Former Smoker     Packs/day: 0.00     Last attempt to quit: 1985     Years since quittin.9   ? Smokeless tobacco: Never Used   Substance and Sexual Activity   ? Alcohol use: No   ? 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   ? Sexual activity: Not on file   Lifestyle   ? Physical activity:     Days per week: Not on file     Minutes per session: Not on file   ? Stress: Not on file   Relationships   ? Social connections:     Talks on phone: Not on file     Gets together: Not on file     Attends Church service: Not on file     Active member of club or organization: Not on file     Attends meetings of clubs or organizations: Not on file     Relationship status: Not on file   ? Intimate partner violence:     Fear of current or ex partner: Not on file     Emotionally abused: Not on file     Physically abused: Not on file     Forced sexual activity: Not on file   Other Topics Concern   ? Not on file   Social History  Narrative    Lives with family.        REVIEW OF SYSTEM:  Pertinent items are noted in HPI.  A 12 point comprehensive review of systems was negative except as noted.    PHYSICAL EXAM:   /90   Pulse 72   Temp (!) 96  F (35.6  C)   Resp 20   Wt (!) 228 lb (103.4 kg)   SpO2 98%   BMI 33.67 kg/m       General Appearance:    Alert, cooperative, no distress, appears stated age   Head:    Normocephalic, without obvious abnormality, atraumatic   Eyes:    PERRL, conjunctiva/corneas clear, both eyes        Ears:    Normal external ear canals, both ears   Nose:   Nares normal, septum midline, mucosa normal, no drainage    or sinus tenderness   Throat:   Lips, mucosa, and tongue normal; teeth and gums normal   Neck:   Supple, symmetrical, trachea midline, no adenopathy;        thyroid:  No enlargement/tenderness/nodules; no carotid    bruit or JVD   Back:     Symmetric, no curvature, ROM normal, no CVA tenderness   Lungs:     Clear to auscultation bilaterally, respirations unlabored   Chest wall:    No tenderness or deformity   Heart:    Regular rate and rhythm, S1 and S2 normal, no murmur, rub   or gallop   Abdomen:     Soft, non-tender, bowel sounds active all four quadrants,     no masses, no organomegaly   Extremities:   Extremities normal, atraumatic, no cyanosis; right arm severe edema and cold fingers; anteriorly rotated shoulder dislocation on right shoulder   Pulses:   2+ and symmetric all extremities except +1 pulses in radial and ulnar   Skin:   Skin color, texture, turgor normal, no rashes or lesions   Neurologic:   Oriented to person, place, time, and situation; exhibits logical thought processes; generalized weakness         LABS:   Lab Results   Component Value Date    WBC 8.7 11/01/2019    HGB 9.8 (L) 11/01/2019    HCT 30.9 (L) 11/01/2019     11/01/2019    CHOL 132 10/19/2014    TRIG 130 10/19/2014    HDL 32 (L) 10/19/2014    ALT 19 02/15/2019    AST 19 02/15/2019     11/21/2019    K 5.3  (H) 11/21/2019     11/21/2019    CREATININE 0.95 11/21/2019    BUN 43 (H) 11/21/2019    CO2 24 11/21/2019    TSH 1.52 10/17/2014    PSA 4.5 02/15/2013    INR 1.14 (H) 08/27/2019        ASSESSMENT:      ICD-10-CM    1. Lymphedema of left arm I89.0    2. Traumatic closed displaced fracture of left shoulder with anterior dislocation with delayed healing, subsequent encounter S42.92XG        PLAN:      Send to ED STAT for further evaluation of acute right arm increased edema with decreased ulnar/radial pulses and known right shoulder dislocation.    Otherwise continue current care plan for all other chronic medical conditions, as they are stable. Encouraged patient to engage in healthy lifestyle behaviors such as engaging in social activities, exercising (PT/OT), eating well, and following care plan. Follow up for routine check-up, or sooner if needed. Will continue to monitor patient and work with nursing staff collaboratively to work toward positive patient outcomes.     Electronically signed by: Britni Farrell, CNP

## 2021-06-19 NOTE — LETTER
Letter by Anuradha Barrett CNP at      Author: Anuradha Barrett CNP Service: -- Author Type: --    Filed:  Encounter Date: 12/3/2019 Status: Signed         Patient: Rashad Caputo   MR Number: [id number]   YOB: 1950   Date of Visit: 12/3/2019       [facility]      NAME:  Rashad Caputo             :  1950    MRN: [id number]    CODE STATUS:  FULL CODE    FACILITY: [facility] [[id number]]         CHIEF COMPLAIN/REASON FOR VISIT:  Chief Complaint   Patient presents with   ? Body Laceration   ? Problem Visit       HISTORY OF PRESENT ILLNESS: Rashad Caputo is a 69 y.o. male being seen for review of multiple medical conditions,. Today wt is up to 251.6 lb which is up 30 pounds x 1 month. I increasied  lasix to 40 mg po two times a day from 40. He has a shen in place, dark sabrina with sedemit.  Per PMH: Patient presented to the hospital with acute urinary retention.  He had a Shen catheter placed and currently discharged with an indwelling Shen catheter.  He will follow with urology for a voiding trial. He was noted to have hematuria on , improved today. He developed a UTI cultures grew strep viridans.   Appears edematous today, wt is up, increased his lasix was increased and still with wt increase, We are sending to ED for increased diuressing and work up.    Allergies   Allergen Reactions   ? Lisinopril Shortness Of Breath and Dizziness   ? Fosinopril Sodium Other (See Comments) and Dizziness     Mouth numbness   ? Tramadol Other (See Comments)     sweating   ? Amoxicillin-Pot Clavulanate Hives and Rash   ? Cephalosporins Hives and Rash   ? Penicillins Rash   :     Current Outpatient Medications   Medication Sig   ? acetaminophen (TYLENOL) 325 MG tablet Take 650 mg by mouth every 6 (six) hours as needed.    ? cyclobenzaprine (FLEXERIL) 10 MG tablet Take 1 tablet (10 mg total) by mouth 2 (two) times a day as needed for muscle spasms.   ? diclofenac  sodium (VOLTAREN) 1 % Gel Apply 1 g topically 3 (three) times a day. Apply to bilateral knees/hips   ? fluticasone propionate (FLONASE) 50 mcg/actuation nasal spray Apply 1 spray into each nostril every 8 (eight) hours as needed. For overuse of nasal decongestant.          ? furosemide (LASIX) 20 MG tablet Take 40 mg by mouth 2 (two) times a day at 9am and 6pm.    ? furosemide (LASIX) 40 MG tablet Take 80 mg by mouth in the morning and 40 mg by mouth at night for 1 week.   ? gabapentin (NEURONTIN) 100 MG capsule Take 200 mg by mouth 3 (three) times a day.   ? magnesium oxide (MAG-OX) 400 mg (241.3 mg magnesium) tablet Take 400 mg by mouth daily.   ? naproxen sodium (ALEVE) 220 MG tablet Take 220 mg by mouth 2 (two) times a day with meals.          ? phenylephrine (NICOLE-SYNEPHRINE) 1 % Spry 1-2 sprays into each nostril every 4 (four) hours as needed.          ? polyethylene glycol (MIRALAX) 17 gram packet Take 1 packet (17 g total) by mouth daily.   ? QUEtiapine (SEROQUEL) 50 MG tablet Take 50 mg by mouth at bedtime.   ? sodium chloride (OCEAN) 0.65 % nasal spray 1 spray into each nostril as needed for congestion. Use with phenylephrine spray.   ? tamsulosin (FLOMAX) 0.4 mg cap Take 0.4 mg by mouth Daily after breakfast.                REVIEW OF SYSTEMS:    Currently, no fever, chills, or rigors. Does not have any visual or hearing problems. Denies any chest pain, headaches, palpitations, lightheadedness, dizziness, shortness of breath, or cough. Appetite is good. Denies any GERD symptoms. Denies any difficulty with swallowing, nausea, or vomiting.  Denies any abdominal pain, was constipated now with loose stools d/t prune juice. Denies any urinary symptoms other then retention,. No insomnia. No active bleeding . No rash. Pain to left arm      PHYSICAL EXAMINATION:  Vitals:    12/04/19 0524   BP: 151/84   Pulse: 78   Temp: (!) 96.1  F (35.6  C)   Weight: (!) 251 lb 9.6 oz (114.1 kg)         GENERAL: Awake, Alert,  oriented x3,unkempt in appearance,  not in any form of acute distress, answers questions appropriately, follows simple commands, conversant  HEENT: Head is normocephalic with normal hair distribution. No evidence of trauma. Ears: No acute purulent discharge. Eyes: Conjunctivae pink with no scleral jaundice. Nose: Normal mucosa and septum. NECK: Supple with no cervical or supraclavicular lymphadenopathy. Trachea is midline.   CHEST: No tenderness or deformity, no crepitus  LUNG: Clear to auscultation with good chest expansion. There are no crackles or wheezes, normal AP diameter.  BACK: No kyphosis of the thoracic spine. Symmetric, no curvature, ROM normal, no CVA tenderness, no spinal tenderness   CVS: There is good S1  S2,  rhythm is regular.  ABDOMEN: Globular and soft, nontender to palpation, non distended, no masses, no organomegaly, good bowel sounds, no rebound or guarding, no peritoneal signs.   EXTREMITIES: ROM UE WNL, left arm with increasedSwelling, lymphedema to legs  SKIN: Warm and dry, no erythema noted, no rashes or lesions.  NEUROLOGICAL: The patient is oriented to person, place and time. Strength and sensation are grossly intact. Face is symmetric.          LABS:    Lab Results   Component Value Date    WBC 5.6 12/03/2019    HGB 8.9 (L) 12/03/2019    HCT 28.1 (L) 12/03/2019    MCV 81 12/03/2019     12/03/2019       Results for orders placed or performed in visit on 12/03/19   Basic Metabolic Panel   Result Value Ref Range    Sodium 140 136 - 145 mmol/L    Potassium 4.4 3.5 - 5.0 mmol/L    Chloride 104 98 - 107 mmol/L    CO2 28 22 - 31 mmol/L    Anion Gap, Calculation 8 5 - 18 mmol/L    Glucose 95 70 - 125 mg/dL    Calcium 9.3 8.5 - 10.5 mg/dL    BUN 39 (H) 8 - 22 mg/dL    Creatinine 0.78 0.70 - 1.30 mg/dL    GFR MDRD Af Amer >60 >60 mL/min/1.73m2    GFR MDRD Non Af Amer >60 >60 mL/min/1.73m2           No results found for: HGBA1C  No results found for: [id number]  No results found for:  [id number]    ASSESSMENT/PLAN:  1. Other hypervolemia    2. Weight gain      1. Continues with fluid overload and wt gain. We recently doubled his lasix and wt up 2 pounds overnight. Gradual trend up with rapid wt increase 30 pounds in one month. In liue of increased edema, more shortness of breath and weakness, send to ED for eval r/t rapid wt onset.    Electronically signed by:  Anuradha Barrett CNP  This progress note was completed using Dragon software and there may be grammatical errors.

## 2021-06-19 NOTE — LETTER
Letter by Alena Angulo NP at      Author: Alena Angulo NP Service: -- Author Type: --    Filed:  Encounter Date: 10/14/2019 Status: Signed         Patient: Rashad Caputo   MR Number: [id number]   YOB: 1950   Date of Visit: 10/14/2019                 [facility]    DATE: 10/14/2019    NAME:  Rashad Caputo             :  1950  MRN: [id number]  CODE STATUS:  POLST AVAILABLE    VISIT TYPE: Problem Visit     FACILITY:  [facility] [[id number]]       CHIEF COMPLAIN/REASON FOR VISIT:    Chief Complaint   Patient presents with   ? Problem Visit               HISTORY OF PRESENT ILLNESS: Rashad Caputo is a 69 y.o. male who was admitted - for hematuria with urinary retention. He had a shen cathter placed and treated for UTI. His culture grew viridans strep bacteria and was treated with levaquin. 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 He was discharged to TCU. He has PMH of spinal stenosis, pressure ulcers, osteoarthritis of right knee, failure to thrive, BPH, malnutrition, CAD, anemia, constipation. Prior to this he had lived at home.     Today Mr. Caputo is seen per urgent request of nursing for significant weight gain. His weights have trended from 170lbs end of August to 218lbs today. Nursing notes he had a 48lb weight gain in 44 days. They are very concerned about fluid overload and significant edema to legs. Nursing feels his edema now extends to his hips. Patient also requested to be seen today as he is also very concerned and even considering transfer to Ed. He has tubigrips in place but he is now too edematous for even the largest size the facility has so they had to be cut in order to place them today. He does not elevate his legs much as he sits in the wheelchair most of the day. He was given one dose of lasix a few weeks ago and lost 1 lb but felt his weight gain was not fluid related. Staff report his appetite is good  but not exaggerated. On exam he is seen in his wheelchair with legs dependent. He notes that his weights have been increasing for some time and he has been quite concerned about his swelling. He feels his hips are even tight and swollen now. His legs are so tight they are beginning to ache and be painful. He says he has never taken diuretics regularly but he has intermittently even over the last few years. He denies having shortness of breath and does not have a cough. He does not feel congested in his lungs but does have nasal and sinus congestion. He says he was abusing Afrin or using it every day for 3 years and now has the rebound congestion. He declines trialing any other sprays or treatments for the congestion at this time. He does say he is not sleeping and has some issues with anxiety or nervous ness at times. Nursing at bedside mentions that they had recommended sleep aids and something for anxiety at night to help him sleep but he has refused. He says he does not like taking meds. He has melatonin as needed but it does not help. We discussed some different options and he says he may be willing to try trazodone but wants to think about this. He was noted to eat about half of his lunch tray in room. He denies dizziness, fevers, nausea, stomach upset. His bowels are moving. He has his shen in still and says they are going to do a void trial in a couple weeks. He does have congestion and pressure in ears but does not like debrox and only likes the pressurized stuff they use at the ENT office. He has an appt with them on 10/22. He and nursing both feel his weights are accurate as it has been a steady climb since admission. He admits his weight was down and appetite a lot less at home before but he does not feel that he is eating excessively now. His abdomen is not distended at all. He and nursing both very concerned today.     REVIEW OF SYSTEMS:  PROBLEMS AND REVIEW OF SYSTEMS:   Today on ROS:   Currently, no  fever, chills, or rigors. Does not have any visual or hearing problems. Denies any chest pain, headaches, palpitations, lightheadedness, dizziness, shortness of breath, or cough. Appetite is good. Denies any GERD symptoms. Denies any difficulty with swallowing, nausea, or vomiting.  Denies any abdominal pain, diarrhea or constipation. No active bleeding. No rash. Positive for weakness, worsening leg swelling, fluid overload, insomnia, anxiety, shen in place      Allergies   Allergen Reactions   ? Lisinopril Shortness Of Breath and Dizziness   ? Fosinopril Sodium Other (See Comments) and Dizziness     Mouth numbness   ? Amoxicillin-Pot Clavulanate Hives and Rash   ? Cephalosporins Hives and Rash   ? Penicillins Rash     Current Outpatient Medications   Medication Sig   ? furosemide (LASIX) 20 MG tablet Take 20 mg by mouth daily.   ? acetaminophen (TYLENOL) 500 MG tablet Take 1-2 tablets (500-1,000 mg total) by mouth every 4 (four) hours as needed.   ? cyclobenzaprine (FLEXERIL) 10 MG tablet Take 1 tablet (10 mg total) by mouth 2 (two) times a day as needed for muscle spasms.   ? fluticasone propionate (FLONASE) 50 mcg/actuation nasal spray Apply 1 spray into each nostril 3 (three) times a day as needed. For overuse of nasal decongestant.   ? magnesium oxide (MAG-OX) 400 mg (241.3 mg magnesium) tablet Take 400 mg by mouth daily.   ? melatonin 3 mg Tab tablet Take 5 mg by mouth at bedtime as needed.   ? naproxen sodium (ALEVE) 220 MG tablet Take 220 mg by mouth at bedtime.   ? phenylephrine (NICOLE-SYNEPHRINE) 1 % Spry 1-2 sprays into each nostril every 4 (four) hours as needed.          ? polyethylene glycol (MIRALAX) 17 gram packet Take 1 packet (17 g total) by mouth daily.   ? sodium chloride (OCEAN) 0.65 % nasal spray 1 spray into each nostril as needed for congestion. Use with phenylephrine spray.   ? tamsulosin (FLOMAX) 0.4 mg cap Take 0.4 mg by mouth.     Past Medical History:    Past Medical History:   Diagnosis  Date   ? Arthritis    ? Benign prostatic hyperplasia with lower urinary tract symptoms 2/23/2017   ? BPH (benign prostatic hypertrophy)    ? Coronary artery disease    ? History of transfusion 1975   ? Hypertension    ? Pneumonia 1980    had scar tissue, double pneumonia   ? Post-void dribbling 2/23/2017   ? Retinal tear of right eye    ? Rhinitis medicamentosa 2/23/2017   ? Spinal stenosis    ? UTI (urinary tract infection)            PHYSICAL EXAMINATION  Vitals:    10/14/19 0700   BP: 122/71   Pulse: 88   Resp: 18   Temp: 97.8  F (36.6  C)   SpO2: 98%   Weight: 218 lb (98.9 kg)       Today on physical exam:     GENERAL: Awake, Alert, oriented x3, unkempt appearance, poor hygiene, not in any form of acute distress, answers questions appropriately, follows simple commands, conversant  HEENT: Head is normocephalic with normal hair distribution. No evidence of trauma. Ears: No acute purulent discharge. Eyes: Conjunctivae pink with no scleral jaundice. Nose: Normal mucosa and septum. NECK: Supple with no cervical or supraclavicular lymphadenopathy. Trachea is midline.   CHEST: No tenderness or deformity, no crepitus  LUNG: dim with scant left lower lobe crackle otherwise clear to auscultation with good chest expansion.   BACK: No kyphosis of the thoracic spine. Symmetric, no curvature, ROM normal, no CVA tenderness, no spinal tenderness   CVS: There is good S1  S2, regular rhythm, there are no murmurs, rubs, gallops, or heaves,  2+ pulses symmetric in all extremities.  ABDOMEN: Rounded and soft, nontender to palpation, non distended, no masses, no organomegaly, good bowel sounds, no rebound or guarding, no peritoneal signs.   EXTREMITIES: 3-4+ ble pitting edema from toes to hips, tubigrips, no wounds, few scabbed blisters noted.  SKIN: Warm and dry, no erythema noted.  Skin color, texture, no rashes or lesions.  NEUROLOGICAL: The patient is oriented to person, place and time. Strength and sensation are grossly intact.  Face is symmetric.            LABS:   Recent Results (from the past 168 hour(s))   Basic Metabolic Panel   Result Value Ref Range    Sodium 139 136 - 145 mmol/L    Potassium 4.5 3.5 - 5.0 mmol/L    Chloride 109 (H) 98 - 107 mmol/L    CO2 25 22 - 31 mmol/L    Anion Gap, Calculation 5 5 - 18 mmol/L    Glucose 97 70 - 125 mg/dL    Calcium 8.9 8.5 - 10.5 mg/dL    BUN 29 (H) 8 - 22 mg/dL    Creatinine 0.65 (L) 0.70 - 1.30 mg/dL    GFR MDRD Af Amer >60 >60 mL/min/1.73m2    GFR MDRD Non Af Amer >60 >60 mL/min/1.73m2   BNP(B-type Natriuretic Peptide)   Result Value Ref Range    BNP 35 0 - 62 pg/mL   HM2(CBC w/o Differential)   Result Value Ref Range    WBC 8.6 4.0 - 11.0 thou/uL    RBC 3.38 (L) 4.40 - 6.20 mill/uL    Hemoglobin 8.9 (L) 14.0 - 18.0 g/dL    Hematocrit 28.8 (L) 40.0 - 54.0 %    MCV 85 80 - 100 fL    MCH 26.3 (L) 27.0 - 34.0 pg    MCHC 30.9 (L) 32.0 - 36.0 g/dL    RDW 16.2 (H) 11.0 - 14.5 %    Platelets 206 140 - 440 thou/uL    MPV 10.6 8.5 - 12.5 fL     Results for orders placed or performed in visit on 10/10/19   Basic Metabolic Panel   Result Value Ref Range    Sodium 139 136 - 145 mmol/L    Potassium 4.5 3.5 - 5.0 mmol/L    Chloride 109 (H) 98 - 107 mmol/L    CO2 25 22 - 31 mmol/L    Anion Gap, Calculation 5 5 - 18 mmol/L    Glucose 97 70 - 125 mg/dL    Calcium 8.9 8.5 - 10.5 mg/dL    BUN 29 (H) 8 - 22 mg/dL    Creatinine 0.65 (L) 0.70 - 1.30 mg/dL    GFR MDRD Af Amer >60 >60 mL/min/1.73m2    GFR MDRD Non Af Amer >60 >60 mL/min/1.73m2         Lab Results   Component Value Date    WBC 8.6 10/10/2019    HGB 8.9 (L) 10/10/2019    HCT 28.8 (L) 10/10/2019    MCV 85 10/10/2019     10/10/2019       No results found for: [id number]  No results found for: HGBA1C  Lab Results   Component Value Date    INR 1.14 (H) 08/27/2019    INR 1.06 04/23/2018    INR 1.15 (H) 10/19/2014     No results found for: [id number]  Lab Results   Component Value Date    TSH 1.52 10/17/2014           ASSESSMENT/PLAN:    1.  Lymphedema, Fluid overload, ?Diastolic CHF: Weights 170-218lbs over last 44 days. Does appear to have severe dependent edema today toes to hips, very tight and pitting 3-4+. Largest size of tubigrips no longer fitting. Difficult for him to elevate but did encourage this. Will order therapy to eval for lymphedema wrapping. Denies shortness of breath today but does have few crackles on exam, not significant congestion though. No diuretics currently, reports has used intermittently in past. Due to severe weight gain and signs of fluid overload as well as patient and nursing urgent concerns will give IM lasix 40mg x 1 now then start lasix 40mg po x 2 days then reduce to 20mg daily for maintenance. Weights changed to daily for closer monitoring. Valentine in place for frequency and closer output monitoring. Encouraged low sodium diet. Bmp on 10/17. Started kcl 20meq daily. Reviewed chart and Echo last done in 2014 did show Grade 1 diastolic dysfunction but EF was 65% at that time. Normal LV function. Small pericardial effusion at the time, no valve abnormalities, no pulmonary hypertension noted. May consider repeat Echo in near future to evaluate cardiac function. Appetite is improved from baseline but not significant, ate half of breakfast tray today and per staff seems adequate but not excessive. No sweets, treats, pop, high calorie snacks noted in room today. Obvious appetite improvement does not account for this significant of weight gain. Appears very fluid overloaded on exam. No ace/arb currently.  2. Hematuria, urinary retention, BPH, recent UTI: Valentine in place today. Did see urology (after several missed appointments) on 10/11 and ordered to have void trial in 2 weeks. F/u with urology in 2 weeks. Continue current treatments and urology orders. Encourage compliance with urology orders for void trial. On flomax, appears urology started alfuzosin on 10/11. Unclear on notes for reasoning as is also alpha blocker.  urology needs to clarify which medication prefer patient to be on.   3. CAD, h/o NSTEMI: no recent chest pain. Continue current regimen and follow up with cardiology as indicated.   4. Spinal stenosis, chronic pain: Pain controlled today. On zanaflex, gabapentin, flexeril, tylenol, voltaren gel, naproxen. Unclear reasoning on multiple muscle relaxers for chronic management but can often contribute to urinary retention and UTIs. Defer to primary attending providers for management.   5. HTN: -150s today. Controlled. Consider adding ace/arb due to fluid overload in addition to daily diuretic.   6. Failure to thrive: per staff suspected eating only one meal a day at home. Vulnerable adult case.   7. Cognitive impairment, medical noncompliance: per staff and records frequent history of noncompliance with treatments, medications, follow up visits. Continue to re-educate. Monitor for safety concerns. Per staff working on NGUYEN admission at discharge.   8. Anxiety: Evaluated by psych and recommended to start medication for management and he has been refusing.   9. Insomnia: On melatonin but reports this doesn't work for him. Discussed other alternatives but cautious about trying anything else. May be willing to try trazodone as did discuss this today.   10. Allergic rhinitis: previously abusing afrin, now on flonase, phenylephrine prn. F/u with ENT on 10/22.     Electronically signed by: Alena Angulo NP    Total floor/unit time spent 45 with 35 time spent on counseling and coordination of care. Counseling was done regarding fluid overload, dependent edema, lymphedema evaluation, causes, treatment options. Counseling regarding allergic rhinitis management, insomnia management, anxiety management, hypertension management. Counseling regarding lab monitoring, ability to manage fluid overload in house and no need for transfer to ed. Coordinated care with nursing for management of fluid overload, lymphedema, urinary  retention, urology follow ups, void trial, insomnia management.

## 2021-06-19 NOTE — LETTER
Letter by Anuradha Barrett CNP at      Author: Anuradha Barrett CNP Service: -- Author Type: --    Filed:  Encounter Date: 2019 Status: Signed         Patient: Rashad Caputo   MR Number: [id number]   YOB: 1950   Date of Visit: 2019       [facility]      NAME:  Rashad Caputo             :  1950    MRN: [id number]    CODE STATUS:  FULL CODE    FACILITY: [facility] [[id number]]         CHIEF COMPLAIN/REASON FOR VISIT:  Chief Complaint   Patient presents with   ? Review Of Multiple Medical Conditions       HISTORY OF PRESENT ILLNESS: Rashad Caputo is a 69 y.o. male being seen for review of multiple medical conditions,. Today wt is up to 249.7lb which is about an 8 pound wt gain in 4 days. I am increasing lasix to 40 mg po two times a day from 40. He has a shen in place, dark sabrina with sedemit.  Per PMH: Patient presented to the hospital with acute urinary retention.  He had a Shen catheter placed and currently discharged with an indwelling Shen catheter.  He will follow with urology for a voiding trial. He was noted to have hematuria on , improved today. He developed a UTI cultures grew strep viridans.   Appears edematous today, wt is up, increased his lasix and we will check BMP in am.    Allergies   Allergen Reactions   ? Lisinopril Shortness Of Breath and Dizziness   ? Fosinopril Sodium Other (See Comments) and Dizziness     Mouth numbness   ? Tramadol Other (See Comments)     sweating   ? Amoxicillin-Pot Clavulanate Hives and Rash   ? Cephalosporins Hives and Rash   ? Penicillins Rash   :     Current Outpatient Medications   Medication Sig   ? acetaminophen (TYLENOL) 325 MG tablet Take 650 mg by mouth 4 (four) times a day.   ? acetaminophen (TYLENOL) 500 MG tablet Take 500-1,000 mg by mouth every 4 (four) hours as needed for pain. Do not exceed 4000 mg in 24 hours   ? cyclobenzaprine (FLEXERIL) 10 MG tablet Take 1 tablet (10 mg  total) by mouth 2 (two) times a day as needed for muscle spasms.   ? diclofenac sodium (VOLTAREN) 1 % Gel Apply 1 g topically 3 (three) times a day. Apply to bilateral knees/hips   ? diclofenac sodium (VOLTAREN) 1 % Gel Apply 1 g topically as needed. To hips/knees/ as needed   ? fluticasone propionate (FLONASE) 50 mcg/actuation nasal spray Apply 1 spray into each nostril every 8 (eight) hours as needed. For overuse of nasal decongestant.          ? furosemide (LASIX) 20 MG tablet Take 40 mg by mouth daily.          ? gabapentin (NEURONTIN) 100 MG capsule Take 200 mg by mouth 3 (three) times a day.   ? magnesium oxide (MAG-OX) 400 mg (241.3 mg magnesium) tablet Take 400 mg by mouth daily.   ? naproxen sodium (ALEVE) 220 MG tablet Take 220 mg by mouth 2 (two) times a day with meals.          ? phenylephrine (NICOLE-SYNEPHRINE) 1 % Spry 1-2 sprays into each nostril every 4 (four) hours as needed.          ? polyethylene glycol (MIRALAX) 17 gram packet Take 1 packet (17 g total) by mouth daily.   ? potassium chloride (K-DUR,KLOR-CON) 10 MEQ tablet Take 10 mEq by mouth daily.   ? QUEtiapine (SEROQUEL) 50 MG tablet Take 50 mg by mouth at bedtime.   ? sodium chloride (OCEAN) 0.65 % nasal spray 1 spray into each nostril as needed for congestion. Use with phenylephrine spray.   ? tamsulosin (FLOMAX) 0.4 mg cap Take 0.4 mg by mouth Daily after breakfast.                REVIEW OF SYSTEMS:    Currently, no fever, chills, or rigors. Does not have any visual or hearing problems. Denies any chest pain, headaches, palpitations, lightheadedness, dizziness, shortness of breath, or cough. Appetite is good. Denies any GERD symptoms. Denies any difficulty with swallowing, nausea, or vomiting.  Denies any abdominal pain, was constipated now with loose stools d/t prune juice. Denies any urinary symptoms other then retention,. No insomnia. No active bleeding . No rash. Pain to left arm      PHYSICAL EXAMINATION:  Vitals:    12/02/19 1603    BP: 123/74   Pulse: 82   Temp: 96.6  F (35.9  C)   Weight: (!) 249 lb 11.2 oz (113.3 kg)         GENERAL: Awake, Alert, oriented x3,unkempt in appearance,  not in any form of acute distress, answers questions appropriately, follows simple commands, conversant  HEENT: Head is normocephalic with normal hair distribution. No evidence of trauma. Ears: No acute purulent discharge. Eyes: Conjunctivae pink with no scleral jaundice. Nose: Normal mucosa and septum. NECK: Supple with no cervical or supraclavicular lymphadenopathy. Trachea is midline.   CHEST: No tenderness or deformity, no crepitus  LUNG: Clear to auscultation with good chest expansion. There are no crackles or wheezes, normal AP diameter.  BACK: No kyphosis of the thoracic spine. Symmetric, no curvature, ROM normal, no CVA tenderness, no spinal tenderness   CVS: There is good S1  S2,  rhythm is regular.  ABDOMEN: Globular and soft, nontender to palpation, non distended, no masses, no organomegaly, good bowel sounds, no rebound or guarding, no peritoneal signs.   EXTREMITIES: ROM UE WNL, left arm with increased pain and Swelling, lymphedema to legs  SKIN: Warm and dry, no erythema noted, no rashes or lesions.  NEUROLOGICAL: The patient is oriented to person, place and time. Strength and sensation are grossly intact. Face is symmetric.          LABS:    Lab Results   Component Value Date    WBC 8.7 11/01/2019    HGB 9.8 (L) 11/01/2019    HCT 30.9 (L) 11/01/2019    MCV 83 11/01/2019     11/01/2019       Results for orders placed or performed in visit on 11/21/19   Basic Metabolic Panel   Result Value Ref Range    Sodium 138 136 - 145 mmol/L    Potassium 5.3 (H) 3.5 - 5.0 mmol/L    Chloride 105 98 - 107 mmol/L    CO2 24 22 - 31 mmol/L    Anion Gap, Calculation 9 5 - 18 mmol/L    Glucose 91 70 - 125 mg/dL    Calcium 9.3 8.5 - 10.5 mg/dL    BUN 43 (H) 8 - 22 mg/dL    Creatinine 0.95 0.70 - 1.30 mg/dL    GFR MDRD Af Amer >60 >60 mL/min/1.73m2    GFR MDRD  Non Af Amer >60 >60 mL/min/1.73m2           No results found for: HGBA1C  No results found for: [id number]  No results found for: [id number]    ASSESSMENT/PLAN:  1. Left arm swelling    2. Weight gain      1. Left arm swelling, he is to be wearing edema glove and tubis, only tubi on in place.     2. His wt is up about 8 pounds in several few days, increasing lasix and we will then check am BMP as well.      Electronically signed by:  Anuradha Barrett CNP  This progress note was completed using Dragon software and there may be grammatical errors.

## 2021-06-19 NOTE — LETTER
Letter by Anuradha Barrett CNP at      Author: Anuradha Barrett CNP Service: -- Author Type: --    Filed:  Encounter Date: 2019 Status: (Other)         Patient: Rashad Caputo   MR Number: [id number]   YOB: 1950   Date of Visit: 2019       [facility]      NAME:  Rashad Caputo             :  1950    MRN: [id number]    CODE STATUS:  FULL CODE    FACILITY: [facility] [[id number]]         CHIEF COMPLAIN/REASON FOR VISIT:  Chief Complaint   Patient presents with   ? Review Of Multiple Medical Conditions       HISTORY OF PRESENT ILLNESS: Rashad Caputo is a 69 y.o. male being seen for review of multiple medical conditions.  He had rounding MD adjust meds on 9/3/19 and also gave pysch consult. Pt unkempt and will not allow staff to assist with bathing, however today he told me one shower a week is not enough. Talked to NM to increase shower schedule. Patient presented to the hospital with acute urinary retention.  He had a Valentine catheter placed and currently discharged with an indwelling Valentine catheter.  He will follow with urology for a voiding trial. He was noted to have hematuria on , improved today. He developed a UTI cultures grew strep viridans.  He was given Levaquin 7 days post discharge, WILL [place] REGIME ON TCU.    Allergies   Allergen Reactions   ? Lisinopril Shortness Of Breath and Dizziness   ? Fosinopril Sodium Other (See Comments) and Dizziness     Mouth numbness   ? Amoxicillin-Pot Clavulanate Hives and Rash   ? Cephalosporins Hives and Rash   ? Penicillins Rash   :     Current Outpatient Medications   Medication Sig   ? acetaminophen (TYLENOL) 500 MG tablet Take 1-2 tablets (500-1,000 mg total) by mouth every 4 (four) hours as needed.   ? fluticasone propionate (FLONASE) 50 mcg/actuation nasal spray Apply 1 spray into each nostril 3 (three) times a day as needed. For overuse of nasal decongestant.   ? levoFLOXacin (LEVAQUIN)  500 MG tablet Take 1 tablet (500 mg total) by mouth Daily at 6:00 am for 7 days.   ? naproxen sodium (ALEVE) 220 MG tablet Take 220 mg by mouth at bedtime.   ? phenylephrine (NICOLE-SYNEPHRINE) 1 % Spry 1-2 sprays into each nostril every 4 (four) hours as needed.          ? polyethylene glycol (MIRALAX) 17 gram packet Take 1 packet (17 g total) by mouth daily.   ? sodium chloride (OCEAN) 0.65 % nasal spray 1 spray into each nostril as needed for congestion. Use with phenylephrine spray.         REVIEW OF SYSTEMS:    Currently, no fever, chills, or rigors. Does not have any visual or hearing problems. Denies any chest pain, headaches, palpitations, lightheadedness, dizziness, shortness of breath, or cough. Appetite is good. Denies any GERD symptoms. Denies any difficulty with swallowing, nausea, or vomiting.  Denies any abdominal pain, diarrhea or constipation. Denies any urinary symptoms other then retention,. No insomnia. No active bleeding. No rash.       PHYSICAL EXAMINATION:  Vitals:    09/05/19 1731   BP: 126/66   Pulse: (!) 59   Temp: (!) 96.3  F (35.7  C)   Weight: 171 lb 3.2 oz (77.7 kg)         GENERAL: Awake, Alert, oriented x3,unkempt in appearance,  not in any form of acute distress, answers questions appropriately, follows simple commands, conversant  HEENT: Head is normocephalic with normal hair distribution. No evidence of trauma. Ears: No acute purulent discharge. Eyes: Conjunctivae pink with no scleral jaundice. Nose: Normal mucosa and septum. NECK: Supple with no cervical or supraclavicular lymphadenopathy. Trachea is midline.   CHEST: No tenderness or deformity, no crepitus  LUNG: Clear to auscultation with good chest expansion. There are no crackles or wheezes, normal AP diameter.  BACK: No kyphosis of the thoracic spine. Symmetric, no curvature, ROM normal, no CVA tenderness, no spinal tenderness   CVS: There is good S1  S2,  rhythm is regular.  ABDOMEN: Globular and soft, nontender to palpation,  non distended, no masses, no organomegaly, good bowel sounds, no rebound or guarding, no peritoneal signs.   EXTREMITIES: ROM UE WNL, he is unable to bear wt on legs. In wc. Pedal pulses present, no edema, arthritic aged joint swelling .  SKIN: Warm and dry, no erythema noted, no rashes or lesions.  NEUROLOGICAL: The patient is oriented to person, place and time. Strength and sensation are grossly intact. Face is symmetric.                    LABS:    Lab Results   Component Value Date    WBC 8.9 08/28/2019    HGB 10.0 (L) 08/29/2019    HCT 36.7 (L) 08/28/2019    MCV 84 08/28/2019     08/28/2019       Results for orders placed or performed during the hospital encounter of 08/27/19   Basic Metabolic Panel   Result Value Ref Range    Sodium 140 136 - 145 mmol/L    Potassium 3.9 3.5 - 5.0 mmol/L    Chloride 107 98 - 107 mmol/L    CO2 23 22 - 31 mmol/L    Anion Gap, Calculation 10 5 - 18 mmol/L    Glucose 93 70 - 125 mg/dL    Calcium 9.1 8.5 - 10.5 mg/dL    BUN 18 8 - 22 mg/dL    Creatinine 0.61 (L) 0.70 - 1.30 mg/dL    GFR MDRD Af Amer >60 >60 mL/min/1.73m2    GFR MDRD Non Af Amer >60 >60 mL/min/1.73m2           No results found for: HGBA1C  No results found for: [id number]  No results found for: MOPQHYCY18    ASSESSMENT/PLAN:  1. Urinary retention    2. Urinary obstruction    3. Spinal stenosis, unspecified spinal region      1. Retention and. Urinary obstruction: Valentine in place, hematuria has disapated Encouraged fluid and request nursing assure he has a secure strap in place.H e has  F/U scheduled.    2. Spinal stenosis: He has pain management in place and therapy has started diathermy which he reported today was helpful. I also encouraged him to use the Voltaren ordered on 9/4/19    Electronically signed by:  Anuradha Barrett CNP  This progress note was completed using Dragon software and there may be grammatical errors.

## 2021-06-19 NOTE — LETTER
Letter by Anuradha Barrett CNP at      Author: Anuradha Barrett CNP Service: -- Author Type: --    Filed:  Encounter Date: 10/1/2019 Status: Signed         Patient: Rashad Caputo   MR Number: [id number]   YOB: 1950   Date of Visit: 10/1/2019       [facility]      NAME:  Rashad Caputo             :  1950    MRN: [id number]    CODE STATUS:  FULL CODE    FACILITY: [facility] [[id number]]         CHIEF COMPLAIN/REASON FOR VISIT:  Chief Complaint   Patient presents with   ? Review Of Multiple Medical Conditions       HISTORY OF PRESENT ILLNESS: Rashad Caputo is a 69 y.o. male being seen for review of multiple medical conditions..Nursing concerns over wt gain, however not sudden, this has been gradual since admission. Patient presented to the hospital with acute urinary retention.  He had a Valentine catheter placed and currently discharged with an indwelling Valentine catheter.  He will follow with urology for a voiding trial. He was noted to have hematuria on , improved today. He developed a UTI cultures grew strep viridans.  He was given Levaquin 7 days post discharge.He has bowel issues of fecal incontinence D/T this, he cancelled his  apt. He remains in a weakened deconditioned state, working towards dc soon, disposition still undetermined.    Allergies   Allergen Reactions   ? Lisinopril Shortness Of Breath and Dizziness   ? Fosinopril Sodium Other (See Comments) and Dizziness     Mouth numbness   ? Amoxicillin-Pot Clavulanate Hives and Rash   ? Cephalosporins Hives and Rash   ? Penicillins Rash   :     Current Outpatient Medications   Medication Sig   ? acetaminophen (TYLENOL) 500 MG tablet Take 1-2 tablets (500-1,000 mg total) by mouth every 4 (four) hours as needed.   ? cyclobenzaprine (FLEXERIL) 10 MG tablet Take 1 tablet (10 mg total) by mouth 2 (two) times a day as needed for muscle spasms.   ? fluticasone propionate (FLONASE) 50  mcg/actuation nasal spray Apply 1 spray into each nostril 3 (three) times a day as needed. For overuse of nasal decongestant.   ? magnesium oxide (MAG-OX) 400 mg (241.3 mg magnesium) tablet Take 400 mg by mouth daily.   ? melatonin 3 mg Tab tablet Take 5 mg by mouth at bedtime as needed.   ? naproxen sodium (ALEVE) 220 MG tablet Take 220 mg by mouth at bedtime.   ? phenylephrine (NICOLE-SYNEPHRINE) 1 % Spry 1-2 sprays into each nostril every 4 (four) hours as needed.          ? polyethylene glycol (MIRALAX) 17 gram packet Take 1 packet (17 g total) by mouth daily.   ? sodium chloride (OCEAN) 0.65 % nasal spray 1 spray into each nostril as needed for congestion. Use with phenylephrine spray.         REVIEW OF SYSTEMS:    Currently, no fever, chills, or rigors. Does not have any visual or hearing problems. Denies any chest pain, headaches, palpitations, lightheadedness, dizziness, shortness of breath, or cough. Appetite is good. Denies any GERD symptoms. Denies any difficulty with swallowing, nausea, or vomiting.  Denies any abdominal pain, was constipated now with loose stools d/t prune juice. Denies any urinary symptoms other then retention,. No insomnia. No active bleeding other then ongoing hematuria. No rash.       PHYSICAL EXAMINATION:  Vitals:    10/01/19 2014   BP: 121/74   Pulse: 87   Temp: 97.1  F (36.2  C)   Weight: 184 lb 6.4 oz (83.6 kg)         GENERAL: Awake, Alert, oriented x3,unkempt in appearance,  not in any form of acute distress, answers questions appropriately, follows simple commands, conversant  HEENT: Head is normocephalic with normal hair distribution. No evidence of trauma. Ears: No acute purulent discharge. Eyes: Conjunctivae pink with no scleral jaundice. Nose: Normal mucosa and septum. NECK: Supple with no cervical or supraclavicular lymphadenopathy. Trachea is midline.   CHEST: No tenderness or deformity, no crepitus  LUNG: Clear to auscultation with good chest expansion. There are no  crackles or wheezes, normal AP diameter.  BACK: No kyphosis of the thoracic spine. Symmetric, no curvature, ROM normal, no CVA tenderness, no spinal tenderness   CVS: There is good S1  S2,  rhythm is regular.  ABDOMEN: Globular and soft, nontender to palpation, non distended, no masses, no organomegaly, good bowel sounds, no rebound or guarding, no peritoneal signs.   EXTREMITIES: ROM UE WNL, he is unable to bear wt on legs. In wc. Pedal pulses present, no edema, arthritic aged joint swelling, right knee very edematous. .Trace edema bilaterally  SKIN: Warm and dry, no erythema noted, no rashes or lesions.  NEUROLOGICAL: The patient is oriented to person, place and time. Strength and sensation are grossly intact. Face is symmetric.          LABS:    Lab Results   Component Value Date    WBC 7.3 09/08/2019    HGB 11.4 (L) 09/08/2019    HCT 35.2 (L) 09/08/2019    MCV 82 09/08/2019     09/08/2019       Results for orders placed or performed in visit on 10/01/19   Basic Metabolic Panel   Result Value Ref Range    Sodium 139 136 - 145 mmol/L    Potassium 4.4 3.5 - 5.0 mmol/L    Chloride 105 98 - 107 mmol/L    CO2 26 22 - 31 mmol/L    Anion Gap, Calculation 8 5 - 18 mmol/L    Glucose 87 70 - 125 mg/dL    Calcium 9.2 8.5 - 10.5 mg/dL    BUN 31 (H) 8 - 22 mg/dL    Creatinine 0.71 0.70 - 1.30 mg/dL    GFR MDRD Af Amer >60 >60 mL/min/1.73m2    GFR MDRD Non Af Amer >60 >60 mL/min/1.73m2           No results found for: HGBA1C  No results found for: [id number]  No results found for: NCAAHYAW61    ASSESSMENT/PLAN:  1. Urinary retention    2. Weight gain      1.  Retention and. Urinary obstruction/ sepsis : Valentine in place, hematuria at random periods, ua was negative. He has   Been to  on 9/30/19 and we are going to try void trial at end of week.   Encouraged fluid     2. Wy Gain:Nursing asked me to see pt for wt gain: He does have minimal edema bilateral to legs, lungs are clear. I suspect ongoing wt gain is slow  progressive vs acute onset. Rashad agrees he has been eating at the TCU better then at home, FTH H/O and he reports good appetite and eating 3 meals and HS snack.        Electronically signed by:  Anuradha Barrett CNP  This progress note was completed using Dragon software and there may be grammatical errors.

## 2021-06-19 NOTE — LETTER
Letter by Juana Le MBBS at      Author: Juana Le MBBS Service: -- Author Type: --    Filed:  Encounter Date: 9/19/2019 Status: (Other)         Patient: Rashad Caputo   MR Number: [id number]   YOB: 1950   Date of Visit: 9/19/2019       [facility] Admission note      Patient: Rashad Caputo  MRN: [id number]  Date of Service: 9/19/2019      [facility] [[id number]]  Reason for Visit     Chief Complaint   Patient presents with   ? 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  -Vulnerable adult  -Failure to thrive with difficulty functioning in his home environment.  Multiple ER visits noted with 6 emergency room visits noted     Plan     Patient has been admitted to the TCU.  He was examined at his request.  He currently has an indwelling Valentine catheter.  He attributes his retention issues to underlying history of spinal stenoses.  He did not keep his follow-up with orthopedics needed for his knee on his back.  On questioning he told me he did not want to follow-up with them because  he is having incontinence.  Similarly he did not keep a follow-up with urology because of incontinence as per him.  Pain management reviewed with him and he is reporting less sedation and more tolerable pain now that he is on a regimen with gabapentin added along with muscle relaxers.  Overall nonambulatory requiring significant cares and is wheelchair-bound  SLUMS 19/30 COGNITIVE  impairment is suspected    History     Patient is a very pleasant 69 y.o. male who is admitted to TCU  Patient presented to the hospital with acute urinary retention.  He had a Valentine catheter placed and currently discharged with an indwelling Valentine catheter.  He will follow with urology for a voiding trial.  So far he has refused to follow-up on questioning he told me today that he did not keep the appointment because of incontinence.  He has chronic incontinence associated with constipation.  He was noted to be manually digging himself up.  Nursing has been pushing stool softeners he refuses and then takes large amount of stool softeners with resultant incontinence.  He has bone-on-bone osteoarthritis of right greater than left knee.  With significant deformity and swelling noted.  He is not very keen to do any surgical follow-up with orthopedics.  Pain management reviewed with him he is on multiple medications sedation concerns today minimal and he feels his pain is more manageable    Past Medical History     Active Ambulatory (Non-Hospital) Problems    Diagnosis   ? Debilitated patient   ? Urinary retention   ? Urinary obstruction   ? Urinary tract infection   ? Benign prostatic hyperplasia with lower urinary tract symptoms   ? Post-void dribbling   ? Rhinitis medicamentosa   ? Sepsis due to urinary tract infection (H)   ? Sepsis (H)   ? CAP (community acquired pneumonia)   ? CAD (coronary artery disease)   ? Non-STEMI (non-ST elevated myocardial infarction) (H)   ? Tachycardia   ? Anxiety   ? Spinal stenosis   ? Hypertension   ?  Benign Prostatic Hypertrophy   ? Myalgia And Myositis   ? Urinary Tract Infection   ? Feelings Of Urinary Urgency   ? Joint Pain, Localized In The Hip     Past Medical History:   Diagnosis Date   ? Arthritis    ? Benign prostatic hyperplasia with lower urinary tract symptoms 2/23/2017   ? BPH (benign prostatic hypertrophy)    ? Coronary artery disease    ? History of transfusion 1975   ? Hypertension    ? Pneumonia 1980   ? Post-void dribbling 2/23/2017   ? Retinal tear of right eye    ? Rhinitis medicamentosa 2/23/2017   ? Spinal stenosis    ? UTI (urinary tract infection)        Past Social History     Reviewed, and he  reports that he quit smoking about 34 years ago. He has never used smokeless tobacco. He reports that he does not drink alcohol or use drugs.    Family History     Reviewed, and includes cataracts and CVD in his father; his mother had dementia; grandmother had DM    Medication List     Current Outpatient Medications on File Prior to Visit   Medication Sig Dispense Refill   ? acetaminophen (TYLENOL) 500 MG tablet Take 1-2 tablets (500-1,000 mg total) by mouth every 4 (four) hours as needed.  0   ? cyclobenzaprine (FLEXERIL) 10 MG tablet Take 1 tablet (10 mg total) by mouth 2 (two) times a day as needed for muscle spasms. 10 tablet 0   ? fluticasone propionate (FLONASE) 50 mcg/actuation nasal spray Apply 1 spray into each nostril 3 (three) times a day as needed. For overuse of nasal decongestant.     ? magnesium oxide (MAG-OX) 400 mg (241.3 mg magnesium) tablet Take 400 mg by mouth daily.     ? naproxen sodium (ALEVE) 220 MG tablet Take 220 mg by mouth at bedtime.     ? phenylephrine (NICOLE-SYNEPHRINE) 1 % Spry 1-2 sprays into each nostril every 4 (four) hours as needed.            ? polyethylene glycol (MIRALAX) 17 gram packet Take 1 packet (17 g total) by mouth daily.  0   ? sodium chloride (OCEAN) 0.65 % nasal spray 1 spray into each nostril as needed for congestion. Use with phenylephrine spray.        No current facility-administered medications on file prior to visit.        Allergies     Allergies   Allergen Reactions   ? Lisinopril Shortness Of Breath and Dizziness   ? Fosinopril Sodium Other (See Comments) and Dizziness     Mouth numbness   ? Amoxicillin-Pot Clavulanate Hives and Rash   ? Cephalosporins Hives and Rash   ? Penicillins Rash       Review of Systems   A comprehensive review of 14 systems was done. Pertinent findings noted here and in history of present illness. All the rest negative.  Constitutional: Negative.  Negative for fever, chills, activity change, appetite change and fatigue.   HENT: Negative for congestion and facial swelling.    Eyes: Negative for photophobia, redness and visual disturbance.   Respiratory: Negative for cough and chest tightness.    Cardiovascular: Negative for chest pain, palpitations and leg swelling.   Gastrointestinal: Negative for nausea, diarrhea, constipation, blood in stool and abdominal distention.   Genitourinary: Negative.    Musculoskeletal: Negative.    Skin: Negative.  Hyperpigmented rescaling noted in both of his hands.  Patient told me it is from chronic handwashing  Neurological: Negative for dizziness, tremors, syncope, weakness, light-headedness and headaches.   Hematological: Does not bruise/bleed easily.   Psychiatric/Behavioral: Negative.        Physical Exam     Recent Vitals 9/18/2019   Weight 178 lbs 13 oz   BSA (m2) 1.99 m2   /84   Pulse 85   Temp 96.6   SpO2 -   Some recent data might be hidden       Constitutional: Oriented to person, place, and time and appears well-developed.   Looks very disabled and deconditioned  HEENT:  Normocephalic and atraumatic.  Eyes: Conjunctivae and EOM are normal. Pupils are equal, round, and reactive to light. No discharge.  No scleral icterus. Nose normal. Mouth/Throat: Oropharynx is clear and moist. No oropharyngeal exudate.    NECK: Normal range of motion. Neck supple. No JVD present. No tracheal  deviation present. No thyromegaly present.   CARDIOVASCULAR: Normal rate, regular rhythm and intact distal pulses.  Exam reveals no gallop and no friction rub.  Systolic murmur present.  PULMONARY: Effort normal and breath sounds normal. No respiratory distress.No Wheezing or rales.  ABDOMEN: Soft. Bowel sounds are normal. No distension and no mass.  There is no tenderness. There is no rebound and no guarding. No HSM.  Open area noted on his right buttock which appears to be chronic with hyperpigmented changes noted with irritation on his buttocks  MUSCULOSKELETAL: Normal range of motion. No edema and no tenderness. Mild kyphosis, no tenderness.  Right lower extremity is swollen with profound arthritic changes noted with swelling in his right knee.  On standing leg is crooked and patient is not able to stand up straight.  Profound kyphoscoliosis of his back also noted and he could not straighten himself up he was in severe pain  LYMPH NODES: Has no cervical, supraclavicular, axillary and groin adenopathy.   NEUROLOGICAL: Alert and oriented to person, place, and time. No cranial nerve deficit.  Normal muscle tone. Coordination normal.   GENITOURINARY: Deferred exam.  Valentine present  SKIN: Skin is warm and dry. No rash noted. No erythema. No pallor.   Hypopigmentary changes noted in his both fingers and hands which patient attributes to chronic handwashing  EXTREMITIES: No cyanosis, no clubbing, no edema. No Deformity.  PSYCHIATRIC: Normal mood, affect and behavior.  Has chronic anxiety      Lab Results       Lab Results   Component Value Date    ALBUMIN 4.0 02/15/2019    ALT 19 02/15/2019    AST 19 02/15/2019    BUN 22 09/08/2019    CALCIUM 9.3 09/08/2019     09/08/2019    CHOL 132 10/19/2014    CO2 25 09/08/2019    CREATININE 0.70 09/11/2019    GFRAA >60 09/11/2019    GFRNONAA >60 09/11/2019     09/08/2019    HCT 35.2 (L) 09/08/2019    WBC 7.3 09/08/2019    HGB 11.4 (L) 09/08/2019    MG 1.8 09/08/2019      09/08/2019    K 4.6 09/08/2019    PSA 4.5 02/15/2013     09/08/2019             Post Discharge Medication Reconciliation Status: discharge medications reconciled and changed, per note/orders (see AVS)      NICKOLAS Alvarado

## 2021-06-19 NOTE — LETTER
Letter by Anuradha Barrett CNP at      Author: Anuradha Barrett CNP Service: -- Author Type: --    Filed:  Encounter Date: 2019 Status: Signed         Patient: Rashad Caputo   MR Number: [id number]   YOB: 1950   Date of Visit: 2019       [facility]      NAME:  Rashad Caputo             :  1950    MRN: [id number]    CODE STATUS:  FULL CODE    FACILITY: [facility] [[id number]]         CHIEF COMPLAIN/REASON FOR VISIT:  Chief Complaint   Patient presents with   ? Problem Visit       HISTORY OF PRESENT ILLNESS: Rashad Caputo is a 69 y.o. male being seen per nursing request. Rashad was at hospital for a swollen arm last week and they would like me to review chart. . Seen today,in room , moaning in pain, reports his left arm is very painful. It is noted to be warm and swollen. Per PMH: Patient presented to the hospital with acute urinary retention.  He had a Valentine catheter placed and currently discharged with an indwelling Valentine catheter.  He will follow with urology for a voiding trial. He was noted to have hematuria on , improved today. He developed a UTI cultures grew strep viridans.  He was given Levaquin 7 days post discharge.He has bowel issues of fecal incontinence, ongoing. Reports some pain to left knee, managed with analgesics.   He was to go to  for Folay removal today, but once again cancelled his apt. He was educated ( again) on risk benefits of prolonged catheter use. He reports to me that he promises to have it removed by next Tuesday.   He does continue with therapies and SN on TCU for management of chronic disease status as well as assist in ADLS and bladder care. Sw has been searching for dc planning as pt will require AL. Rashad reports overall feeling of well being with pain managed. OT is now following for  lymph wraps.  F/U from acute care labs, he was put on Levaquin for a UTI when in acute care, note his results  were resistive to levaquin.    Allergies   Allergen Reactions   ? Lisinopril Shortness Of Breath and Dizziness   ? Fosinopril Sodium Other (See Comments) and Dizziness     Mouth numbness   ? Amoxicillin-Pot Clavulanate Hives and Rash   ? Cephalosporins Hives and Rash   ? Penicillins Rash   :     Current Outpatient Medications   Medication Sig   ? acetaminophen (TYLENOL) 325 MG tablet Take 650 mg by mouth 4 (four) times a day.   ? acetaminophen (TYLENOL) 500 MG tablet Take 500-1,000 mg by mouth every 4 (four) hours as needed for pain. Do not exceed 4000 mg in 24 hours   ? cyclobenzaprine (FLEXERIL) 10 MG tablet Take 1 tablet (10 mg total) by mouth 2 (two) times a day as needed for muscle spasms.   ? diclofenac sodium (VOLTAREN) 1 % Gel Apply 1 g topically 3 (three) times a day. Apply to bilateral knees/hips   ? diclofenac sodium (VOLTAREN) 1 % Gel Apply 1 g topically as needed. To hips/knees/ as needed   ? fluticasone propionate (FLONASE) 50 mcg/actuation nasal spray Apply 1 spray into each nostril every 8 (eight) hours as needed. For overuse of nasal decongestant.          ? furosemide (LASIX) 20 MG tablet Take 20 mg by mouth daily.   ? gabapentin (NEURONTIN) 100 MG capsule Take 100 mg by mouth 2 (two) times a day.   ? levoFLOXacin (LEVAQUIN) 750 MG tablet Take 1 tablet (750 mg total) by mouth daily for 3 days.   ? magnesium oxide (MAG-OX) 400 mg (241.3 mg magnesium) tablet Take 400 mg by mouth daily.   ? naproxen sodium (ALEVE) 220 MG tablet Take 220 mg by mouth 2 (two) times a day with meals.          ? phenylephrine (NICOLE-SYNEPHRINE) 1 % Spry 1-2 sprays into each nostril every 4 (four) hours as needed.          ? polyethylene glycol (MIRALAX) 17 gram packet Take 1 packet (17 g total) by mouth daily.   ? potassium chloride (K-DUR,KLOR-CON) 20 MEQ tablet Take 20 mEq by mouth daily.   ? sodium chloride (OCEAN) 0.65 % nasal spray 1 spray into each nostril as needed for congestion. Use with phenylephrine spray.   ?  tamsulosin (FLOMAX) 0.4 mg cap Take 0.4 mg by mouth Daily after breakfast.          ? tiZANidine (ZANAFLEX) 4 MG tablet Take 4 mg by mouth twice a day with lunch and supper.         REVIEW OF SYSTEMS:    Currently, no fever, chills, or rigors. Does not have any visual or hearing problems. Denies any chest pain, headaches, palpitations, lightheadedness, dizziness, shortness of breath, or cough. Appetite is good. Denies any GERD symptoms. Denies any difficulty with swallowing, nausea, or vomiting.  Denies any abdominal pain, was constipated now with loose stools d/t prune juice. Denies any urinary symptoms other then retention,. No insomnia. No active bleeding other then ongoing hematuria. No rash.       PHYSICAL EXAMINATION:  Vitals:    11/04/19 1709   BP: 104/81   Pulse: 94   Temp: 98.1  F (36.7  C)   Weight: 219 lb 3.2 oz (99.4 kg)         GENERAL: Awake, Alert, oriented x3,unkempt in appearance,  not in any form of acute distress, answers questions appropriately, follows simple commands, conversant  HEENT: Head is normocephalic with normal hair distribution. No evidence of trauma. Ears: No acute purulent discharge. Eyes: Conjunctivae pink with no scleral jaundice. Nose: Normal mucosa and septum. NECK: Supple with no cervical or supraclavicular lymphadenopathy. Trachea is midline.   CHEST: No tenderness or deformity, no crepitus  LUNG: Clear to auscultation with good chest expansion. There are no crackles or wheezes, normal AP diameter.  BACK: No kyphosis of the thoracic spine. Symmetric, no curvature, ROM normal, no CVA tenderness, no spinal tenderness   CVS: There is good S1  S2,  rhythm is regular.  ABDOMEN: Globular and soft, nontender to palpation, non distended, no masses, no organomegaly, good bowel sounds, no rebound or guarding, no peritoneal signs.   EXTREMITIES: ROM UE WNL, left arm with increased pain and Swelling  SKIN: Warm and dry, no erythema noted, no rashes or lesions.  NEUROLOGICAL: The patient  is oriented to person, place and time. Strength and sensation are grossly intact. Face is symmetric.          LABS:    Lab Results   Component Value Date    WBC 8.7 11/01/2019    HGB 9.8 (L) 11/01/2019    HCT 30.9 (L) 11/01/2019    MCV 83 11/01/2019     11/01/2019       Results for orders placed or performed during the hospital encounter of 11/01/19   Basic Metabolic Panel   Result Value Ref Range    Sodium 136 136 - 145 mmol/L    Potassium 5.5 (H) 3.5 - 5.0 mmol/L    Chloride 104 98 - 107 mmol/L    CO2 24 22 - 31 mmol/L    Anion Gap, Calculation 8 5 - 18 mmol/L    Glucose 95 70 - 125 mg/dL    Calcium 9.2 8.5 - 10.5 mg/dL    BUN 28 (H) 8 - 22 mg/dL    Creatinine 0.77 0.70 - 1.30 mg/dL    GFR MDRD Af Amer >60 >60 mL/min/1.73m2    GFR MDRD Non Af Amer >60 >60 mL/min/1.73m2           No results found for: HGBA1C  No results found for: [id number]  No results found for: ECPWRQFL77    ASSESSMENT/PLAN:  1. Left arm swelling    2. Acute cystitis without hematuria      1. Painful left arm swelling, Reports pain improving, reports from ED was possible fx and subluxation of hs left shoulder, no orders. I did request [facility] obtain hospital note and we set up a f/u visit to ortho. Also questioning OT if a sling would be beneficial for support to arm. Rashad does have momment to arm.    2.Hematuria: In [place] of UTI being treated with levaquin which sensitivity is resistive,we will start him on Bactim DS 1 po two times a day X 7DAYS.                Electronically signed by:  Anuradha Barrett CNP  This progress note was completed using Dragon software and there may be grammatical errors.

## 2021-06-19 NOTE — LETTER
"Letter by Anuradha Barrett CNP at      Author: Anuradha Barrett CNP Service: -- Author Type: --    Filed:  Encounter Date: 2019 Status: Signed         Patient: Rashad Caputo   MR Number: [id number]   YOB: 1950   Date of Visit: 2019       [facility]      NAME:  Rashad Caputo             :  1950    MRN: [id number]    CODE STATUS:  FULL CODE    FACILITY: [facility] [[id number]]         CHIEF COMPLAIN/REASON FOR VISIT:  Chief Complaint   Patient presents with   ? Review Of Multiple Medical Conditions       HISTORY OF PRESENT ILLNESS: Rashad Caputo is a 69 y.o. male being seen for review of multiple medical conditions.  Per PMH: Patient presented to the hospital with acute urinary retention.  He had a Shen catheter placed and currently discharged with an indwelling Shen catheter.  He will follow with urology for a voiding trial. He was noted to have hematuria on , improved today. He developed a UTI cultures grew strep viridans.  He was given Levaquin 7 days post discharge.He has bowel issues of fecal incontinence, ongoing. Reports some pain to left knee, managed with analgesics.   He was to go to  for Folay removal today, but once again cancelled his apt. He was educated ( again) on risk benefits of prolonged catheter use. He reports to me that he promises to have it removed by next Tuesday.   He does continue with therapies and SN on TCU for management of chronic disease status as well as assist in ADLS and bladder care. Sw has been searching for dc planning as pt will require AL. Rashad reports overall feeling of well being with pain managed. OT is now following for  lymph wraps.  Currently on Bactrim for UTI, our [place] is he will allow  to remove shen, as per Rashad he reports , \"next week\".     Allergies   Allergen Reactions   ? Lisinopril Shortness Of Breath and Dizziness   ? Fosinopril Sodium Other (See Comments) and " Dizziness     Mouth numbness   ? Amoxicillin-Pot Clavulanate Hives and Rash   ? Cephalosporins Hives and Rash   ? Penicillins Rash   :     Current Outpatient Medications   Medication Sig   ? acetaminophen (TYLENOL) 325 MG tablet Take 650 mg by mouth 4 (four) times a day.   ? acetaminophen (TYLENOL) 500 MG tablet Take 500-1,000 mg by mouth every 4 (four) hours as needed for pain. Do not exceed 4000 mg in 24 hours   ? cyclobenzaprine (FLEXERIL) 10 MG tablet Take 1 tablet (10 mg total) by mouth 2 (two) times a day as needed for muscle spasms.   ? diclofenac sodium (VOLTAREN) 1 % Gel Apply 1 g topically 3 (three) times a day. Apply to bilateral knees/hips   ? diclofenac sodium (VOLTAREN) 1 % Gel Apply 1 g topically as needed. To hips/knees/ as needed   ? fluticasone propionate (FLONASE) 50 mcg/actuation nasal spray Apply 1 spray into each nostril every 8 (eight) hours as needed. For overuse of nasal decongestant.          ? furosemide (LASIX) 20 MG tablet Take 20 mg by mouth daily.   ? gabapentin (NEURONTIN) 100 MG capsule Take 100 mg by mouth 2 (two) times a day.   ? magnesium oxide (MAG-OX) 400 mg (241.3 mg magnesium) tablet Take 400 mg by mouth daily.   ? naproxen sodium (ALEVE) 220 MG tablet Take 220 mg by mouth 2 (two) times a day with meals.          ? phenylephrine (NICOLE-SYNEPHRINE) 1 % Spry 1-2 sprays into each nostril every 4 (four) hours as needed.          ? polyethylene glycol (MIRALAX) 17 gram packet Take 1 packet (17 g total) by mouth daily.   ? potassium chloride (K-DUR,KLOR-CON) 20 MEQ tablet Take 20 mEq by mouth daily.   ? sodium chloride (OCEAN) 0.65 % nasal spray 1 spray into each nostril as needed for congestion. Use with phenylephrine spray.   ? tamsulosin (FLOMAX) 0.4 mg cap Take 0.4 mg by mouth Daily after breakfast.          ? tiZANidine (ZANAFLEX) 4 MG tablet Take 4 mg by mouth twice a day with lunch and supper.         REVIEW OF SYSTEMS:    Currently, no fever, chills, or rigors. Does not have  any visual or hearing problems. Denies any chest pain, headaches, palpitations, lightheadedness, dizziness, shortness of breath, or cough. Appetite is good. Denies any GERD symptoms. Denies any difficulty with swallowing, nausea, or vomiting.  Denies any abdominal pain, was constipated now with loose stools d/t prune juice. Denies any urinary symptoms other then retention,. No insomnia. No active bleeding other then ongoing hematuria. No rash.       PHYSICAL EXAMINATION:  There were no vitals filed for this visit.      GENERAL: Awake, Alert, oriented x3,unkempt in appearance,  not in any form of acute distress, answers questions appropriately, follows simple commands, conversant  HEENT: Head is normocephalic with normal hair distribution. No evidence of trauma. Ears: No acute purulent discharge. Eyes: Conjunctivae pink with no scleral jaundice. Nose: Normal mucosa and septum. NECK: Supple with no cervical or supraclavicular lymphadenopathy. Trachea is midline.   CHEST: No tenderness or deformity, no crepitus  LUNG: Clear to auscultation with good chest expansion. There are no crackles or wheezes, normal AP diameter.  BACK: No kyphosis of the thoracic spine. Symmetric, no curvature, ROM normal, no CVA tenderness, no spinal tenderness   CVS: There is good S1  S2,  rhythm is regular.  ABDOMEN: Globular and soft, nontender to palpation, non distended, no masses, no organomegaly, good bowel sounds, no rebound or guarding, no peritoneal signs.   EXTREMITIES: ROM UE WNL, left arm with increased pain and Swelling  SKIN: Warm and dry, no erythema noted, no rashes or lesions.  NEUROLOGICAL: The patient is oriented to person, place and time. Strength and sensation are grossly intact. Face is symmetric.          LABS:    Lab Results   Component Value Date    WBC 8.7 11/01/2019    HGB 9.8 (L) 11/01/2019    HCT 30.9 (L) 11/01/2019    MCV 83 11/01/2019     11/01/2019       Results for orders placed or performed in visit on  11/05/19   Basic Metabolic Panel   Result Value Ref Range    Sodium 136 136 - 145 mmol/L    Potassium 4.9 3.5 - 5.0 mmol/L    Chloride 106 98 - 107 mmol/L    CO2 25 22 - 31 mmol/L    Anion Gap, Calculation 5 5 - 18 mmol/L    Glucose 90 70 - 125 mg/dL    Calcium 8.9 8.5 - 10.5 mg/dL    BUN 39 (H) 8 - 22 mg/dL    Creatinine 0.76 0.70 - 1.30 mg/dL    GFR MDRD Af Amer >60 >60 mL/min/1.73m2    GFR MDRD Non Af Amer >60 >60 mL/min/1.73m2           No results found for: HGBA1C  No results found for: [id number]  No results found for: NECDHHBX50    ASSESSMENT/PLAN:  1. Debilitated patient    2. Lymphedema    3. Left arm swelling        1.Debilitated t: DC planning issues, he is to decide if he will accept placement in LTC, same facility as wife. No longer able to manage at home, W/C bound, debilitated    2.Lymphedema: Still seen by therapy for wraps, has had labs reviewed and wt gain reviewed in past. Adjustments prn by therapy    3. Left arm swelling, Reports pain improving, reports from ED was possible fx and subluxation of hs left shoulder, no orders. I did request referal OT if a sling would be beneficial for support to arm. Rashad does have momment to arm. Refused offer of voltaren this am.                Electronically signed by:  Anuradha Barrett CNP  This progress note was completed using Dragon software and there may be grammatical errors.

## 2021-06-19 NOTE — LETTER
Letter by Anuradha Barrett CNP at      Author: Anuradha Barrett CNP Service: -- Author Type: --    Filed:  Encounter Date: 2019 Status: (Other)         Patient: Rashad Caputo   MR Number: [id number]   YOB: 1950   Date of Visit: 2019       [facility]      NAME:  Rashad Caputo             :  1950    MRN: [id number]    CODE STATUS:  FULL CODE    FACILITY: [facility] [[id number]]         CHIEF COMPLAIN/REASON FOR VISIT:  Chief Complaint   Patient presents with   ? Review Of Multiple Medical Conditions       HISTORY OF PRESENT ILLNESS: Rashad Caputo is a 69 y.o. male being seen for review of multiple medical conditions.. Patient presented to the hospital with acute urinary retention.  He had a Shen catheter placed and currently discharged with an indwelling Shen catheter.  He will follow with urology for a voiding trial. He was noted to have hematuria on , improved today. He developed a UTI cultures grew strep viridans.  He was given Levaquin 7 days post discharge, now completed but has an increase of hematuria again today. D/T this, he cancelled his  apt. I am going to obtain ua, but he refuses shen change. He remains in a weakened deconditioned state    Allergies   Allergen Reactions   ? Lisinopril Shortness Of Breath and Dizziness   ? Fosinopril Sodium Other (See Comments) and Dizziness     Mouth numbness   ? Amoxicillin-Pot Clavulanate Hives and Rash   ? Cephalosporins Hives and Rash   ? Penicillins Rash   :     Current Outpatient Medications   Medication Sig   ? acetaminophen (TYLENOL) 500 MG tablet Take 1-2 tablets (500-1,000 mg total) by mouth every 4 (four) hours as needed.   ? cyclobenzaprine (FLEXERIL) 10 MG tablet Take 1 tablet (10 mg total) by mouth 2 (two) times a day as needed for muscle spasms.   ? fluticasone propionate (FLONASE) 50 mcg/actuation nasal spray Apply 1 spray into each nostril 3 (three) times a day as  needed. For overuse of nasal decongestant.   ? magnesium oxide (MAG-OX) 400 mg (241.3 mg magnesium) tablet Take 400 mg by mouth daily.   ? naproxen sodium (ALEVE) 220 MG tablet Take 220 mg by mouth at bedtime.   ? phenylephrine (NICOLE-SYNEPHRINE) 1 % Spry 1-2 sprays into each nostril every 4 (four) hours as needed.          ? polyethylene glycol (MIRALAX) 17 gram packet Take 1 packet (17 g total) by mouth daily.   ? sodium chloride (OCEAN) 0.65 % nasal spray 1 spray into each nostril as needed for congestion. Use with phenylephrine spray.         REVIEW OF SYSTEMS:    Currently, no fever, chills, or rigors. Does not have any visual or hearing problems. Denies any chest pain, headaches, palpitations, lightheadedness, dizziness, shortness of breath, or cough. Appetite is good. Denies any GERD symptoms. Denies any difficulty with swallowing, nausea, or vomiting.  Denies any abdominal pain, was constipated now with loose stools d/t prune juice. Denies any urinary symptoms other then retention,. No insomnia. No active bleeding. No rash.       PHYSICAL EXAMINATION:  Vitals:    09/18/19 0549   BP: 149/84   Pulse: 85   Temp: 96.6  F (35.9  C)   Weight: 178 lb 12.8 oz (81.1 kg)         GENERAL: Awake, Alert, oriented x3,unkempt in appearance,  not in any form of acute distress, answers questions appropriately, follows simple commands, conversant  HEENT: Head is normocephalic with normal hair distribution. No evidence of trauma. Ears: No acute purulent discharge. Eyes: Conjunctivae pink with no scleral jaundice. Nose: Normal mucosa and septum. NECK: Supple with no cervical or supraclavicular lymphadenopathy. Trachea is midline.   CHEST: No tenderness or deformity, no crepitus  LUNG: Clear to auscultation with good chest expansion. There are no crackles or wheezes, normal AP diameter.  BACK: No kyphosis of the thoracic spine. Symmetric, no curvature, ROM normal, no CVA tenderness, no spinal tenderness   CVS: There is good S1   S2,  rhythm is regular.  ABDOMEN: Globular and soft, nontender to palpation, non distended, no masses, no organomegaly, good bowel sounds, no rebound or guarding, no peritoneal signs.   EXTREMITIES: ROM UE WNL, he is unable to bear wt on legs. In wc. Pedal pulses present, no edema, arthritic aged joint swelling, right knee very edematous. .  SKIN: Warm and dry, no erythema noted, no rashes or lesions.  NEUROLOGICAL: The patient is oriented to person, place and time. Strength and sensation are grossly intact. Face is symmetric.                  LABS:    Lab Results   Component Value Date    WBC 7.3 09/08/2019    HGB 11.4 (L) 09/08/2019    HCT 35.2 (L) 09/08/2019    MCV 82 09/08/2019     09/08/2019       Results for orders placed or performed during the hospital encounter of 09/08/19   Basic Metabolic Panel   Result Value Ref Range    Sodium 136 136 - 145 mmol/L    Potassium 4.6 3.5 - 5.0 mmol/L    Chloride 104 98 - 107 mmol/L    CO2 25 22 - 31 mmol/L    Anion Gap, Calculation 7 5 - 18 mmol/L    Glucose 101 70 - 125 mg/dL    Calcium 9.3 8.5 - 10.5 mg/dL    BUN 22 8 - 22 mg/dL    Creatinine 0.65 (L) 0.70 - 1.30 mg/dL    GFR MDRD Af Amer >60 >60 mL/min/1.73m2    GFR MDRD Non Af Amer >60 >60 mL/min/1.73m2           No results found for: HGBA1C  No results found for: [id number]  No results found for: YRBKRHLB44    ASSESSMENT/PLAN:  1. Sepsis due to urinary tract infection (H)    2. Urinary retention    3. Debilitated patient      1. Retention and. Urinary obstruction/ sepsis : Shen in place, hematuria has disapated Encouraged fluid and request nursing assure he has a secure strap in place.H e has  F/U was cancelled. He is now completed ABX regime, we will recheck UA d/t hematuria    2.Generalized deconditioned state: Continue [facility] TCU protocols and POC for therapies.  services for shen management, med management and chronic disease managment  Electronically signed by:  Anuradha Barrett CNP  This progress note  was completed using Dragon software and there may be grammatical errors.

## 2021-06-19 NOTE — LETTER
"Letter by Juana Le MBBS at      Author: Juana Le MBBS Service: -- Author Type: --    Filed:  Encounter Date: 12/5/2019 Status: Signed         Patient: Rashad Caputo   MR Number: [id number]   YOB: 1950   Date of Visit: 12/5/2019     Code Status:  FULL CODE  Visit Type: Problem Visit     Facility:  [facility] [[id number]]       Facility Type: SNF (Skilled Nursing Facility, TCU)    History of Present Illness: Rashad Caputo is a 69 y.o. male with medical history of rhinitis medicamentosa, hypertension, coronary artery disease, BPH with urinary retention and chronic indwelling urinary catheter, chronic left shoulder dislocation, and chronic right knee edema being seen today in follow-up.    Was evaluated on 11/22 at [facility] for worsening left shoulder pain and swelling and again on 12/3 at [facility] for weight gain/edema.  On 11/22, MRI of the left shoulder did show anterior dislocation of the humeral head as well as a chronic Hill-Sachs lesion of the humeral head and likely full-thickness tears of the supraspinatus, infraspinatus, and subscapularis muscles.  He has a glenohumeral joint effusion and significant subacromial bursitis.  This reportedly is needing to be managed primarily outpatient with [facility], and he needs to have left shoulder surgery.  He tells me today that after seeing [facility] in the clinic, they want \"to wait\" to do the surgery.    He has been gaining weight recently, as much as 25 pounds in 1 week.  Thinking this is due to eating double portions daily while at the TCU.  Complex social situation was reviewed with the patient's bedside RN and Dr. Le.  He notes that getting up out of his wheelchair is quite difficult due to pain and limited range of motion in the left shoulder.  He also has significant knee pain on the right.  He also has quite a bit of swelling in both legs, which makes it difficult for him to ambulate.  " He gets tired and fatigued very easily as well.  He otherwise denies difficulty breathing.  He denies any chest pain.  He is frustrated by the lack of progress in getting the fluid off his body.  He notes that he was formally a  and had a cartilage injury to his right knee, but that the knee has been getting worse for several years.  Denies any recent injury that he is aware of the left shoulder or the right knee.    Mood and behaviors are reviewed and he is voicing some depression issues but has been resistant to medication he reports is related to his situational stressors that he is experiencing.    He has a history of BPH and has an indwelling Valentine catheter will cancel frequently urology's appointments and does not want to try a voiding trial in the TCU    Review of Systems - General ROS: No fevers or chills.  Psychological ROS: No difficulty mentating.  Respiratory ROS: No shortness of breath, no cough.  Cardiovascular ROS: No chest pain.  Gastrointestinal ROS: No significant abdominal pain.  No nausea.  Good appetite.  Musculoskeletal ROS: + Left shoulder pain, + right knee pain.  Neurological ROS: No numbness or tingling in the extremities.    Physical Exam:   Weight is 261 pounds.  Blood pressure 111/56 temp 98 pulse 82  Constitutional: Appears grossly edematous, appears disheveled, appears uncomfortable.  HENT: Nose is normal.  EOMI.  No scleral icterus appreciated.   Cardiovascular: Normal rate, regular rhythm and normal heart sounds. No murmur appreciated.  Pulmonary/Chest: Lung sounds are clear and vesicular bilaterally in the posterior thorax.  No increased work of breathing.  There are no crackles, rhonchi or wheezes.  Musculoskeletal: Left shoulder appears dislocated anteriorly.  Head of the humerus is visible and there is pain to palpation around the shoulder joint.  There is also some ecchymosis and significant, 2+ pitting edema extending throughout the left arm into the fingertips.   The right knee is also grossly edematous, but not erythematous.  It is not warm to the touch.  Patient has pain and limited range of motion in the right knee.  Neurological: Alert.  No focal deficits appreciated.  Radial pulse is 2+ on the left.  Skin: 2+ pitting edema in the left arm throughout.  2+ pitting edema in the bilateral lower extremities, with significant edema in the right knee, as noted above.  Psychiatric: Normal mood and affect.  Appears to answer questions appropriately.    Labs: No new labs were obtained today.    Assessment/Plan:    #Chronic left shoulder dislocation  -This is quite a difficult case.  69-year-old male, vulnerable adult, has been at [facility] now for quite some time.  This is my first time meeting him today, but he has significant pathology in his left shoulder and has almost no range of motion in that left shoulder.  Per imaging noted above, his rotator cuff is essentially completely torn and his biceps tendon is also torn on the left.  He also has significant osteoarthritis, recurrent anterior dislocation, chronic Hill-Sachs deformity in the humerus, and significant bursitis.  He needs left shoulder surgery to regain any sort of meaningful movement in his left arm/shoulder.  -Sounds like getting him to follow-up consistently with orthopedics has been difficult.  Would recommend surgical consultation and proceeding with surgery as soon as able for the left shoulder, as I worry that this could get worse if he does not do anything now.  -Continue ANALY hose compression to the left arm to help with edema.  Palpable radial pulse on the left, so no current vascular compromise.  Sensation remains intact as well.  -Continue PT/OT work, but he needs definitive surgical management.    #Peripheral edema  -Patient has 2+ pitting edema in the bilateral lower extremities that seems to be getting worse over the last several weeks.  No significant shortness of breath, however.  Last echocardiogram was in  2014, from what I can see, showing a small circumferential pericardial effusion without evidence for tamponade.  LVEF was 65% at that time, with mild diastolic dysfunction.  I would recommend repeat echocardiogram and checking a BNP to see if there could be a significant component of CHF contributing to his fluid buildup.  -Continue Lasix 80 mg daily for now.  Consider transition to Bumex 2 mg twice daily if no response to Lasix.  -Echocardiogram when able, pending work-up of other issues as above.  -Last knee x-ray I can see in care everywhere was from 2015 at [facility]; I assume he has had one at [facility], but I cannot see these results right now.  X-ray from 2015 shows marked narrowing of the lateral compartment of the right knee with bone-on-bone appearance.  There is also mild to moderate tricompartmental degenerative spurring of the right knee.  Presume that this is worsened significantly since 2015.  Right knee is not warm and does not appear erythematous, so I doubt this is gout.  No fevers or chills or systemic signs of infection, so I doubt septic arthritis as well.  Would recommend repeat imaging of the right knee if not done previously and total knee arthroscopy if he can be cleared medically.        Patient was discussed and evaluated with Dr. Le.  Geovanni Lanier, PGY-3  [facility]  Pager:  [phone]    Patient was examined independently and in the presence of the resident.  Care plan was reviewed in detail.  Unfortunately he is a poor surgical candidate and outcomes will not be good as the nature of his orthopedic injuries are chronic and with poor motivation and poor functional status outcomes are likely to be poor with poor rehab potential noted.  His best bet is to move to long-term care which she is resistant to.  Advised him to think about it monitor his oral intake  NICKOLAS Alvarado

## 2021-06-19 NOTE — LETTER
Letter by Anuradha Barrett CNP at      Author: Anuradha Barrett CNP Service: -- Author Type: --    Filed:  Encounter Date: 10/10/2019 Status: Signed         Patient: Rashad Caputo   MR Number: [id number]   YOB: 1950   Date of Visit: 10/10/2019       [facility]      NAME:  Rashad Caputo             :  1950    MRN: [id number]    CODE STATUS:  FULL CODE    FACILITY: [facility] [[id number]]         CHIEF COMPLAIN/REASON FOR VISIT:  Chief Complaint   Patient presents with   ? Review Of Multiple Medical Conditions       HISTORY OF PRESENT ILLNESS: Rashad Caputo is a 69 y.o. male being seen for review of multiple medical conditions. Patient presented to the hospital with acute urinary retention.  He had a Valentine catheter placed and currently discharged with an indwelling Valentine catheter.  He will follow with urology for a voiding trial. He was noted to have hematuria on , improved today. He developed a UTI cultures grew strep viridans.  He was given Levaquin 7 days post discharge.He has bowel issues of fecal incontinence, ongoing. Reports some pain to left knee, managed with analgesics.   He was to go to  for Folay removal today, but once again cancelled his apt. He was educated ( again) on risk benefits of prolonged catheter use. He reports to me that he promises to have it removed by next Tuesday.   He does continue with therapies and SN on TCU for management of chronic disease status as well as assist in ADLS and bladder care.He has a f/u apt ( again) on 10/11/19, family plans oncoming so pt will not cancel.    Allergies   Allergen Reactions   ? Lisinopril Shortness Of Breath and Dizziness   ? Fosinopril Sodium Other (See Comments) and Dizziness     Mouth numbness   ? Amoxicillin-Pot Clavulanate Hives and Rash   ? Cephalosporins Hives and Rash   ? Penicillins Rash   :     Current Outpatient Medications   Medication Sig   ? tamsulosin (FLOMAX) 0.4  mg cap Take 0.4 mg by mouth.   ? acetaminophen (TYLENOL) 500 MG tablet Take 1-2 tablets (500-1,000 mg total) by mouth every 4 (four) hours as needed.   ? cyclobenzaprine (FLEXERIL) 10 MG tablet Take 1 tablet (10 mg total) by mouth 2 (two) times a day as needed for muscle spasms.   ? fluticasone propionate (FLONASE) 50 mcg/actuation nasal spray Apply 1 spray into each nostril 3 (three) times a day as needed. For overuse of nasal decongestant.   ? magnesium oxide (MAG-OX) 400 mg (241.3 mg magnesium) tablet Take 400 mg by mouth daily.   ? melatonin 3 mg Tab tablet Take 5 mg by mouth at bedtime as needed.   ? naproxen sodium (ALEVE) 220 MG tablet Take 220 mg by mouth at bedtime.   ? phenylephrine (NICOLE-SYNEPHRINE) 1 % Spry 1-2 sprays into each nostril every 4 (four) hours as needed.          ? polyethylene glycol (MIRALAX) 17 gram packet Take 1 packet (17 g total) by mouth daily.   ? sodium chloride (OCEAN) 0.65 % nasal spray 1 spray into each nostril as needed for congestion. Use with phenylephrine spray.         REVIEW OF SYSTEMS:    Currently, no fever, chills, or rigors. Does not have any visual or hearing problems. Denies any chest pain, headaches, palpitations, lightheadedness, dizziness, shortness of breath, or cough. Appetite is good. Denies any GERD symptoms. Denies any difficulty with swallowing, nausea, or vomiting.  Denies any abdominal pain, was constipated now with loose stools d/t prune juice. Denies any urinary symptoms other then retention,. No insomnia. No active bleeding other then ongoing hematuria. No rash.       PHYSICAL EXAMINATION:  Vitals:    10/10/19 1715   BP: 143/86   Pulse: 86   Temp: 97.4  F (36.3  C)   Weight: 206 lb (93.4 kg)         GENERAL: Awake, Alert, oriented x3,unkempt in appearance,  not in any form of acute distress, answers questions appropriately, follows simple commands, conversant  HEENT: Head is normocephalic with normal hair distribution. No evidence of trauma. Ears: No  acute purulent discharge. Eyes: Conjunctivae pink with no scleral jaundice. Nose: Normal mucosa and septum. NECK: Supple with no cervical or supraclavicular lymphadenopathy. Trachea is midline.   CHEST: No tenderness or deformity, no crepitus  LUNG: Clear to auscultation with good chest expansion. There are no crackles or wheezes, normal AP diameter.  BACK: No kyphosis of the thoracic spine. Symmetric, no curvature, ROM normal, no CVA tenderness, no spinal tenderness   CVS: There is good S1  S2,  rhythm is regular.  ABDOMEN: Globular and soft, nontender to palpation, non distended, no masses, no organomegaly, good bowel sounds, no rebound or guarding, no peritoneal signs.   EXTREMITIES: ROM UE WNL, he is unable to bear wt on legs. In wc. Pedal pulses present, no edema, arthritic aged joint swelling, right knee very edematous. .Trace edema bilaterally  SKIN: Warm and dry, no erythema noted, no rashes or lesions.  NEUROLOGICAL: The patient is oriented to person, place and time. Strength and sensation are grossly intact. Face is symmetric.          LABS:    Lab Results   Component Value Date    WBC 8.6 10/10/2019    HGB 8.9 (L) 10/10/2019    HCT 28.8 (L) 10/10/2019    MCV 85 10/10/2019     10/10/2019       Results for orders placed or performed in visit on 10/10/19   Basic Metabolic Panel   Result Value Ref Range    Sodium 139 136 - 145 mmol/L    Potassium 4.5 3.5 - 5.0 mmol/L    Chloride 109 (H) 98 - 107 mmol/L    CO2 25 22 - 31 mmol/L    Anion Gap, Calculation 5 5 - 18 mmol/L    Glucose 97 70 - 125 mg/dL    Calcium 8.9 8.5 - 10.5 mg/dL    BUN 29 (H) 8 - 22 mg/dL    Creatinine 0.65 (L) 0.70 - 1.30 mg/dL    GFR MDRD Af Amer >60 >60 mL/min/1.73m2    GFR MDRD Non Af Amer >60 >60 mL/min/1.73m2           No results found for: HGBA1C  No results found for: [id number]  No results found for: [id number]    ASSESSMENT/PLAN:  1. Spinal stenosis, unspecified spinal region    2. Urinary retention    3. Anxiety      1.  Spinal Stenosis: Pain controlled on current med regime. He is attending therapy on the TCU. DC planning is ongoing as he will require an AL setting unable to manage at home undependably.    2.  Retention and. Urinary obstruction: Valentine in place, no hematuria  He has been cancelling his  APTS. Now has apt on 10/11 and he has family that will accompany him to apt. Urine clear straw color.    3 Anxiety: Reviewed ot anxiety and as per Dr Cisneros suggestions would like pt to start anti anxiety. Pt has refused medicinal intervention. ALMA plans on fu with Rashad to review.    Electronically signed by:  Anuradha Barrett CNP  This progress note was completed using Dragon software and there may be grammatical errors.

## 2021-06-19 NOTE — LETTER
Letter by Anuradha Barrett CNP at      Author: Anuradha Barrett CNP Service: -- Author Type: --    Filed:  Encounter Date: 10/17/2019 Status: Signed         Patient: Rashad Caputo   MR Number: [id number]   YOB: 1950   Date of Visit: 10/17/2019       [facility]      NAME:  Rashad Caputo             :  1950    MRN: [id number]    CODE STATUS:  FULL CODE    FACILITY: [facility] [[id number]]         CHIEF COMPLAIN/REASON FOR VISIT:  Chief Complaint   Patient presents with   ? Review Of Multiple Medical Conditions       HISTORY OF PRESENT ILLNESS: Rashad Caputo is a 69 y.o. male being seen for review of multiple medical conditions. Patient presented to the hospital with acute urinary retention.  He had a Valentine catheter placed and currently discharged with an indwelling Valentine catheter.  He will follow with urology for a voiding trial. He was noted to have hematuria on , improved today. He developed a UTI cultures grew strep viridans.  He was given Levaquin 7 days post discharge.He has bowel issues of fecal incontinence, ongoing. Reports some pain to left knee, managed with analgesics.   He was to go to  for Folay removal today, but once again cancelled his apt. He was educated ( again) on risk benefits of prolonged catheter use. He reports to me that he promises to have it removed by next Tuesday.   He does continue with therapies and SN on TCU for management of chronic disease status as well as assist in ADLS and bladder care. Sw has been searching for dc planning as pt will require AL. Rashad reports overall feeling of well being with pain managed.    Allergies   Allergen Reactions   ? Lisinopril Shortness Of Breath and Dizziness   ? Fosinopril Sodium Other (See Comments) and Dizziness     Mouth numbness   ? Amoxicillin-Pot Clavulanate Hives and Rash   ? Cephalosporins Hives and Rash   ? Penicillins Rash   :     Current Outpatient Medications    Medication Sig   ? acetaminophen (TYLENOL) 500 MG tablet Take 1-2 tablets (500-1,000 mg total) by mouth every 4 (four) hours as needed.   ? cyclobenzaprine (FLEXERIL) 10 MG tablet Take 1 tablet (10 mg total) by mouth 2 (two) times a day as needed for muscle spasms.   ? fluticasone propionate (FLONASE) 50 mcg/actuation nasal spray Apply 1 spray into each nostril 3 (three) times a day as needed. For overuse of nasal decongestant.   ? furosemide (LASIX) 20 MG tablet Take 20 mg by mouth daily.   ? magnesium oxide (MAG-OX) 400 mg (241.3 mg magnesium) tablet Take 400 mg by mouth daily.   ? melatonin 3 mg Tab tablet Take 5 mg by mouth at bedtime as needed.   ? naproxen sodium (ALEVE) 220 MG tablet Take 220 mg by mouth at bedtime.   ? phenylephrine (NICOLE-SYNEPHRINE) 1 % Spry 1-2 sprays into each nostril every 4 (four) hours as needed.          ? polyethylene glycol (MIRALAX) 17 gram packet Take 1 packet (17 g total) by mouth daily.   ? sodium chloride (OCEAN) 0.65 % nasal spray 1 spray into each nostril as needed for congestion. Use with phenylephrine spray.   ? tamsulosin (FLOMAX) 0.4 mg cap Take 0.4 mg by mouth.         REVIEW OF SYSTEMS:    Currently, no fever, chills, or rigors. Does not have any visual or hearing problems. Denies any chest pain, headaches, palpitations, lightheadedness, dizziness, shortness of breath, or cough. Appetite is good. Denies any GERD symptoms. Denies any difficulty with swallowing, nausea, or vomiting.  Denies any abdominal pain, was constipated now with loose stools d/t prune juice. Denies any urinary symptoms other then retention,. No insomnia. No active bleeding other then ongoing hematuria. No rash.       PHYSICAL EXAMINATION:  Vitals:    10/17/19 2145   BP: 155/86   Pulse: 97   Temp: 97.2  F (36.2  C)   Weight: 218 lb (98.9 kg)         GENERAL: Awake, Alert, oriented x3,unkempt in appearance,  not in any form of acute distress, answers questions appropriately, follows simple commands,  conversant  HEENT: Head is normocephalic with normal hair distribution. No evidence of trauma. Ears: No acute purulent discharge. Eyes: Conjunctivae pink with no scleral jaundice. Nose: Normal mucosa and septum. NECK: Supple with no cervical or supraclavicular lymphadenopathy. Trachea is midline.   CHEST: No tenderness or deformity, no crepitus  LUNG: Clear to auscultation with good chest expansion. There are no crackles or wheezes, normal AP diameter.  BACK: No kyphosis of the thoracic spine. Symmetric, no curvature, ROM normal, no CVA tenderness, no spinal tenderness   CVS: There is good S1  S2,  rhythm is regular.  ABDOMEN: Globular and soft, nontender to palpation, non distended, no masses, no organomegaly, good bowel sounds, no rebound or guarding, no peritoneal signs.   EXTREMITIES: ROM UE WNL, he is unable to bear wt on legs. In wc. Pedal pulses present, pedal  edema, arthritic aged joint swelling, right knee very edematous. .Trace edema bilaterally  SKIN: Warm and dry, no erythema noted, no rashes or lesions.  NEUROLOGICAL: The patient is oriented to person, place and time. Strength and sensation are grossly intact. Face is symmetric.          LABS:    Lab Results   Component Value Date    WBC 8.6 10/10/2019    HGB 8.9 (L) 10/10/2019    HCT 28.8 (L) 10/10/2019    MCV 85 10/10/2019     10/10/2019       Results for orders placed or performed in visit on 10/17/19   Basic Metabolic Panel   Result Value Ref Range    Sodium 140 136 - 145 mmol/L    Potassium 4.6 3.5 - 5.0 mmol/L    Chloride 106 98 - 107 mmol/L    CO2 29 22 - 31 mmol/L    Anion Gap, Calculation 5 5 - 18 mmol/L    Glucose 97 70 - 125 mg/dL    Calcium 8.9 8.5 - 10.5 mg/dL    BUN 34 (H) 8 - 22 mg/dL    Creatinine 0.69 (L) 0.70 - 1.30 mg/dL    GFR MDRD Af Amer >60 >60 mL/min/1.73m2    GFR MDRD Non Af Amer >60 >60 mL/min/1.73m2           No results found for: HGBA1C  No results found for: [id number]  No results found for:  [id number]    ASSESSMENT/PLAN:  1. Debilitated patient    2. Urinary obstruction    3. Urinary retention      1. Debilitated pt: On TCU, , w/c for mobility. SW continues to assist with dc planning, will need AL placement on dc.    2/3.  Retention and. Urinary obstruction: Valentine in place, no hematuria  Urine clear straw color.Is followed by , last cath changed last week at  clinic.         Electronically signed by:  Anuradha Barrett CNP  This progress note was completed using Dragon software and there may be grammatical errors.

## 2021-06-19 NOTE — LETTER
Letter by Anuradha Barrett CNP at      Author: Anuradha Barrett CNP Service: -- Author Type: --    Filed:  Encounter Date: 2019 Status: Signed         Patient: Rashad Caputo   MR Number: [id number]   YOB: 1950   Date of Visit: 2019       [facility]      NAME:  Rashad Caputo             :  1950    MRN: [id number]    CODE STATUS:  FULL CODE    FACILITY: [facility] [[id number]]         CHIEF COMPLAIN/REASON FOR VISIT:  Chief Complaint   Patient presents with   ? Problem Visit       HISTORY OF PRESENT ILLNESS: Rashad aCputo is a 69 y.o. male being seen for review of multiple medical conditions, asked per staff to see pt for increased arm pain. Left arm has increased edema. He needs an MRI but has not scheduled yet per his choice. Past work up negative for DVT. .  Per PMH: Patient presented to the hospital with acute urinary retention.  He had a Shen catheter placed and currently discharged with an indwelling Shen catheter.  He will follow with urology for a voiding trial. He was noted to have hematuria on , improved today. He developed a UTI cultures grew strep viridans.  He was given Levaquin 7 days post discharge.He has bowel issues of fecal incontinence, ongoing. Reports some pain to left knee, managed with analgesics.   He was to go to  for Folay removal today, but once again cancelled his apt. He was educated ( again) on risk benefits of prolonged catheter use. He reports to me that he promises to have it removed by next Tuesday.   He does has shen, is to see  on 19/ Lwft arm with increased edema, reports pain. Refuses offer of voltaren gel. Wt has been stable sibce 19. Appears edematous today, from upper arm to hand. New open area to his left arm, probable fluid blister opened. It id dry, suggest leave OA.    Allergies   Allergen Reactions   ? Lisinopril Shortness Of Breath and Dizziness   ? Fosinopril Sodium  Other (See Comments) and Dizziness     Mouth numbness   ? Amoxicillin-Pot Clavulanate Hives and Rash   ? Cephalosporins Hives and Rash   ? Penicillins Rash   :     Current Outpatient Medications   Medication Sig   ? acetaminophen (TYLENOL) 325 MG tablet Take 650 mg by mouth 4 (four) times a day.   ? acetaminophen (TYLENOL) 500 MG tablet Take 500-1,000 mg by mouth every 4 (four) hours as needed for pain. Do not exceed 4000 mg in 24 hours   ? cyclobenzaprine (FLEXERIL) 10 MG tablet Take 1 tablet (10 mg total) by mouth 2 (two) times a day as needed for muscle spasms.   ? diclofenac sodium (VOLTAREN) 1 % Gel Apply 1 g topically 3 (three) times a day. Apply to bilateral knees/hips   ? diclofenac sodium (VOLTAREN) 1 % Gel Apply 1 g topically as needed. To hips/knees/ as needed   ? fluticasone propionate (FLONASE) 50 mcg/actuation nasal spray Apply 1 spray into each nostril every 8 (eight) hours as needed. For overuse of nasal decongestant.          ? furosemide (LASIX) 20 MG tablet Take 20 mg by mouth daily.   ? gabapentin (NEURONTIN) 100 MG capsule Take 100 mg by mouth 2 (two) times a day.   ? magnesium oxide (MAG-OX) 400 mg (241.3 mg magnesium) tablet Take 400 mg by mouth daily.   ? naproxen sodium (ALEVE) 220 MG tablet Take 220 mg by mouth 2 (two) times a day with meals.          ? phenylephrine (NICOLE-SYNEPHRINE) 1 % Spry 1-2 sprays into each nostril every 4 (four) hours as needed.          ? polyethylene glycol (MIRALAX) 17 gram packet Take 1 packet (17 g total) by mouth daily.   ? potassium chloride (K-DUR,KLOR-CON) 20 MEQ tablet Take 20 mEq by mouth daily.   ? sodium chloride (OCEAN) 0.65 % nasal spray 1 spray into each nostril as needed for congestion. Use with phenylephrine spray.   ? tamsulosin (FLOMAX) 0.4 mg cap Take 0.4 mg by mouth Daily after breakfast.          ? tiZANidine (ZANAFLEX) 4 MG tablet Take 4 mg by mouth twice a day with lunch and supper.         REVIEW OF SYSTEMS:    Currently, no fever, chills,  or rigors. Does not have any visual or hearing problems. Denies any chest pain, headaches, palpitations, lightheadedness, dizziness, shortness of breath, or cough. Appetite is good. Denies any GERD symptoms. Denies any difficulty with swallowing, nausea, or vomiting.  Denies any abdominal pain, was constipated now with loose stools d/t prune juice. Denies any urinary symptoms other then retention,. No insomnia. No active bleeding other then ongoing hematuria. No rash. Pain to left arm      PHYSICAL EXAMINATION:  Vitals:    11/19/19 0502   BP: 121/81   Pulse: 85   Temp: 98.6  F (37  C)   Weight: (!) 223 lb 6.4 oz (101.3 kg)         GENERAL: Awake, Alert, oriented x3,unkempt in appearance,  not in any form of acute distress, answers questions appropriately, follows simple commands, conversant  HEENT: Head is normocephalic with normal hair distribution. No evidence of trauma. Ears: No acute purulent discharge. Eyes: Conjunctivae pink with no scleral jaundice. Nose: Normal mucosa and septum. NECK: Supple with no cervical or supraclavicular lymphadenopathy. Trachea is midline.   CHEST: No tenderness or deformity, no crepitus  LUNG: Clear to auscultation with good chest expansion. There are no crackles or wheezes, normal AP diameter.  BACK: No kyphosis of the thoracic spine. Symmetric, no curvature, ROM normal, no CVA tenderness, no spinal tenderness   CVS: There is good S1  S2,  rhythm is regular.  ABDOMEN: Globular and soft, nontender to palpation, non distended, no masses, no organomegaly, good bowel sounds, no rebound or guarding, no peritoneal signs.   EXTREMITIES: ROM UE WNL, left arm with increased pain and Swelling  SKIN: Warm and dry, no erythema noted, no rashes or lesions.  NEUROLOGICAL: The patient is oriented to person, place and time. Strength and sensation are grossly intact. Face is symmetric.          LABS:    Lab Results   Component Value Date    WBC 8.7 11/01/2019    HGB 9.8 (L) 11/01/2019    HCT 30.9  (L) 11/01/2019    MCV 83 11/01/2019     11/01/2019       Results for orders placed or performed in visit on 11/05/19   Basic Metabolic Panel   Result Value Ref Range    Sodium 136 136 - 145 mmol/L    Potassium 4.9 3.5 - 5.0 mmol/L    Chloride 106 98 - 107 mmol/L    CO2 25 22 - 31 mmol/L    Anion Gap, Calculation 5 5 - 18 mmol/L    Glucose 90 70 - 125 mg/dL    Calcium 8.9 8.5 - 10.5 mg/dL    BUN 39 (H) 8 - 22 mg/dL    Creatinine 0.76 0.70 - 1.30 mg/dL    GFR MDRD Af Amer >60 >60 mL/min/1.73m2    GFR MDRD Non Af Amer >60 >60 mL/min/1.73m2           No results found for: HGBA1C  No results found for: TQVXHENJ14SG  No results found for: [id number]    ASSESSMENT/PLAN:  1. Left arm swelling    2. Pain      1. Left arm swelling and pain: Past  reports from ED was possible fx and subluxation of hs left shoulder, he needs f/u MRI but has not agreed to go. I feel some pain may be related to his increased edema.   Give lasix extra 40 mg po today and increase his daily dose from 30 to 40 daily. BMP f/u this week.              Electronically signed by:  Anuradha Barrett CNP  This progress note was completed using Dragon software and there may be grammatical errors.

## 2021-06-19 NOTE — LETTER
Letter by Juana Le MBBS at      Author: Juana Le MBBS Service: -- Author Type: --    Filed:  Encounter Date: 9/3/2019 Status: (Other)         Patient: Rashad Caputo   MR Number: [id number]   YOB: 1950   Date of Visit: 9/3/2019       [facility] Admission note      Patient: Rashad Caputo  MRN: [id number]  Date of Service: 9/3/2019      [facility] [[id number]]  Reason for Visit     Chief Complaint   Patient presents with   ? 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  -Vulnerable adult  -Failure to thrive with difficulty functioning in his home environment.  Multiple ER visits noted with 6 emergency room visits noted     Plan     Patient has been admitted to the TCU.  He currently has an indwelling Valentine catheter denies any hematuria denies any pain.  As per him his constipation issues have resolved and he is no longer taking his MiraLAX.  He understands that he was severely constipated but will not take any MiraLAX as per him.  Encourage compliance.  He is again requesting Afrin nasal spray and told staff that if this was not given to him at bedside because he likes to use it by the hour he will walk out of here.  His bed mobility is  profoundly limited he needed 2 people to sit him up he could not even swing his legs out of the bed and to examine his buttocks we had to make him stand requiring significant assistance.  Home care was reviewed he has been labeled as a vulnerable adult.  Apparently his home has been condemned and he will be looking for alternative placement his wife who has dementia has been moved also from their and placed into institutional care.  I had a discussion with him regarding moving to an alternative place of residence.  Pain management optimization requested by him with underlying history of spinal stenosis plan is to schedule Tylenol 650 mg 4 times daily.  Also will increase naproxen to 220 mg twice daily.  Avoid narcotics he has had a bad response to tramadol in the past  For underlying history of significant mood disorder patient inability to process reality and the fact that he is profoundly debilitated he has no longer any home to go back and is threatening to walk out of this facility and will order a psychology eval.  Total time spent is 45 minutes more than 35 minutes spent face-to-face talking to him and examining this patient including reviewing care concerns including discharge planning and pain management  His poor functional status in light of the fact that he is essentially bedbound with pressure sores and has severe back stenoses along with advanced osteoarthritis of his right knee which are profoundly limiting his mobility.  His functional status is very poor and as per him he has not been able to get any satisfactory response to getting this surgically repaired.  Pain management was also reviewed I am hesitant to give him narcotics.  In light of his underlying personality issues and resistant to care with threatening to leave TCU I am going to order a psychology eval for him    Advance care directives and goals were reviewed with him and total of 16 minutes and more were spent in with him reviewing this  CODE STATUS reviewed with him and he is requesting a full code    History     Patient is a very pleasant 69 y.o. male who is admitted to TCU  Patient presented to the hospital with acute urinary retention.  He had a Valentine catheter placed and currently discharged with an indwelling Valentine catheter.  He will follow with urology for a voiding trial.  He was noted to have hematuria.  Underwent bladder irrigation.  He developed a UTI cultures grew strep viridans.  He was given Levaquin 7 days post discharge.  He has an underlying history of Afrin abuse and medication was discontinued  He has severe osteoarthritis in his right knee.  When I asked him to stand his leg is crooked and swollen with significant effusion seen on his knee joint.  He cannot straighten his leg out and has chronic debilitating end-stage arthritis that he is aware of.  He also gives a history of severe lumbar spinal stenoses.  He has underlying severe kyphoscoliosis which limits his mobility.  He told me that he cannot stand and he has been pretty much bedbound.  Pressure sore noted on his right buttock again chronic as per patient  But mobility was extremely limited  He required 2 people assist to even stand up and that time was extremely painful for him he could barely stand for a short distance without crying out in pain.  His posture was impaired with severe kyphoscoliosis and also deformity noted in his right leg.  Patient is aware of these limitations    Past Medical History     Active Ambulatory (Non-Hospital) Problems    Diagnosis   ? Urinary retention   ? Urinary obstruction   ? Urinary tract infection   ? Benign prostatic hyperplasia with lower urinary tract symptoms   ? Post-void dribbling   ? Rhinitis medicamentosa   ? Sepsis due to urinary tract infection (H)   ? Sepsis (H)   ? CAP (community acquired pneumonia)   ? CAD (coronary artery disease)   ? Non-STEMI (non-ST elevated myocardial infarction) (H)   ? Tachycardia   ? Anxiety   ?  Spinal Stenosis   ? Hypertension   ? Benign Prostatic Hypertrophy   ? Myalgia And Myositis   ? Urinary Tract Infection   ? Feelings Of Urinary Urgency   ? Joint Pain, Localized In The Hip     Past Medical History:   Diagnosis Date   ? Arthritis    ? Benign prostatic hyperplasia with lower urinary tract symptoms 2/23/2017   ? BPH (benign prostatic hypertrophy)    ? Coronary artery disease    ? History of transfusion 1975   ? Hypertension    ? Pneumonia 1980   ? Post-void dribbling 2/23/2017   ? Retinal tear of right eye    ? Rhinitis medicamentosa 2/23/2017   ? Spinal stenosis    ? UTI (urinary tract infection)        Past Social History     Reviewed, and he  reports that he quit smoking about 34 years ago. He has never used smokeless tobacco. He reports that he does not drink alcohol or use drugs.    Family History     Reviewed, and includes cataracts and CVD in his father; his mother had dementia; grandmother had DM    Medication List     Current Outpatient Medications on File Prior to Visit   Medication Sig Dispense Refill   ? acetaminophen (TYLENOL) 500 MG tablet Take 1-2 tablets (500-1,000 mg total) by mouth every 4 (four) hours as needed.  0   ? fluticasone propionate (FLONASE) 50 mcg/actuation nasal spray Apply 1 spray into each nostril 3 (three) times a day as needed. For overuse of nasal decongestant.     ? levoFLOXacin (LEVAQUIN) 500 MG tablet Take 1 tablet (500 mg total) by mouth Daily at 6:00 am for 7 days. 7 tablet 0   ? naproxen sodium (ALEVE) 220 MG tablet Take 220 mg by mouth at bedtime.     ? phenylephrine (NICOLE-SYNEPHRINE) 1 % Spry 1-2 sprays into each nostril every 4 (four) hours as needed.            ? polyethylene glycol (MIRALAX) 17 gram packet Take 1 packet (17 g total) by mouth daily.  0   ? sodium chloride (OCEAN) 0.65 % nasal spray 1 spray into each nostril as needed for congestion. Use with phenylephrine spray.       No current facility-administered medications on file prior to visit.         Allergies     Allergies   Allergen Reactions   ? Lisinopril Shortness Of Breath and Dizziness   ? Fosinopril Sodium Other (See Comments) and Dizziness     Mouth numbness   ? Amoxicillin-Pot Clavulanate Hives and Rash   ? Cephalosporins Hives and Rash   ? Penicillins Rash       Review of Systems   A comprehensive review of 14 systems was done. Pertinent findings noted here and in history of present illness. All the rest negative.  Constitutional: Negative.  Negative for fever, chills, activity change, appetite change and fatigue.   HENT: Negative for congestion and facial swelling.    Eyes: Negative for photophobia, redness and visual disturbance.   Respiratory: Negative for cough and chest tightness.    Cardiovascular: Negative for chest pain, palpitations and leg swelling.   Gastrointestinal: Negative for nausea, diarrhea, constipation, blood in stool and abdominal distention.   Genitourinary: Negative.    Musculoskeletal: Negative.    Skin: Negative.  Hyperpigmented rescaling noted in both of his hands.  Patient told me it is from chronic handwashing  Neurological: Negative for dizziness, tremors, syncope, weakness, light-headedness and headaches.   Hematological: Does not bruise/bleed easily.   Psychiatric/Behavioral: Negative.        Physical Exam     Recent Vitals 8/31/2019   Height -   Weight -   BSA (m2) -   /78   Pulse 81   Temp 97.4   Temp src 1   SpO2 100   Some recent data might be hidden       Constitutional: Oriented to person, place, and time and appears well-developed.   Looks very disabled and deconditioned  HEENT:  Normocephalic and atraumatic.  Eyes: Conjunctivae and EOM are normal. Pupils are equal, round, and reactive to light. No discharge.  No scleral icterus. Nose normal. Mouth/Throat: Oropharynx is clear and moist. No oropharyngeal exudate.    NECK: Normal range of motion. Neck supple. No JVD present. No tracheal deviation present. No thyromegaly present.   CARDIOVASCULAR: Normal  rate, regular rhythm and intact distal pulses.  Exam reveals no gallop and no friction rub.  Systolic murmur present.  PULMONARY: Effort normal and breath sounds normal. No respiratory distress.No Wheezing or rales.  ABDOMEN: Soft. Bowel sounds are normal. No distension and no mass.  There is no tenderness. There is no rebound and no guarding. No HSM.  Open area noted on his right buttock which appears to be chronic with hyperpigmented changes noted with irritation on his buttocks  MUSCULOSKELETAL: Normal range of motion. No edema and no tenderness. Mild kyphosis, no tenderness.  Right lower extremity is swollen with profound arthritic changes noted with swelling in his right knee.  On standing leg is crooked and patient is not able to stand up straight.  Profound kyphoscoliosis of his back also noted and he could not straighten himself up he was in severe pain  LYMPH NODES: Has no cervical, supraclavicular, axillary and groin adenopathy.   NEUROLOGICAL: Alert and oriented to person, place, and time. No cranial nerve deficit.  Normal muscle tone. Coordination normal.   GENITOURINARY: Deferred exam.  Valentine present  SKIN: Skin is warm and dry. No rash noted. No erythema. No pallor.   Hypopigmentary changes noted in his both fingers and hands which patient attributes to chronic handwashing  EXTREMITIES: No cyanosis, no clubbing, no edema. No Deformity.  PSYCHIATRIC: Normal mood, affect and behavior.      Lab Results       Lab Results   Component Value Date    ALBUMIN 4.0 02/15/2019    ALT 19 02/15/2019    AST 19 02/15/2019    BUN 18 08/28/2019    CALCIUM 9.1 08/28/2019     08/28/2019    CHOL 132 10/19/2014    CO2 23 08/28/2019    CREATININE 0.58 (L) 08/29/2019    GFRAA >60 08/29/2019    GFRNONAA >60 08/29/2019    GLU 93 08/28/2019    HCT 36.7 (L) 08/28/2019    WBC 8.9 08/28/2019    HGB 10.0 (L) 08/29/2019    MG 1.6 (L) 08/29/2019     08/28/2019    K 4.0 08/29/2019    PSA 4.5 02/15/2013     08/28/2019            Imaging Results     Xr Abdomen 2 Views    Result Date: 8/27/2019  EXAM: XR ABDOMEN 2 VIEWS LOCATION: [facility] DATE/TIME: 8/27/2019 8:20 AM INDICATION: Constipation. COMPARISON: 9/5/2018 CT. FINDINGS: Relatively large amount of stool in the rectum and moderate amount of stool in the right colon. Bowel gas pattern is normal with no evidence of obstruction. No free air. Advanced spondylotic change and moderate thoracolumbar curvature.       Ct Abdomen Pelvis Without Oral With Without Iv Contrast    Result Date: 8/27/2019  EXAM: CT ABDOMEN PELVIS WO ORAL W WO IV CONTRAST LOCATION: [facility] DATE/TIME: 8/27/2019 11:09 AM INDICATION: Hematuria, unknown cause hematuria COMPARISON: 6/5/2018 TECHNIQUE: Helical thin-section CT scan of the abdomen and pelvis was performed without contrast. Images were then obtained during and after injection of IV contrast, with delayed images through the renal collecting systems. Multiplanar reformats were obtained. Dose reduction techniques were used.? CONTRAST: Iohexol (Omni) 100 mL FINDINGS: LUNG BASES: There is coronary artery calcification. ABDOMEN: The kidneys are normal in size without perinephric stranding. There is no hydronephrosis or hydroureter. There are no renal or  ureteral calculi. A small left renal parapelvic cysts is suspected. Normal spleen, pancreas, adrenal glands, liver, and gallbladder. Normal stomach. Normal caliber of the small bowel. There is atherosclerotic disease. PELVIS: There is a Valentine catheter in the urinary bladder, which is not distended. There is urinary bladder wall thickening with mucosal enhancement. The prostate gland is not well assessed. The seminal vesicles are symmetric. There is a large amount of stool in the rectum. Otherwise there is a moderate amount of stool throughout the remainder of the colon. The appendix is not discretely visualized. MUSCULOSKELETAL: There is lateral curvature of the spine. Degenerative  osseous changes are present.     CONCLUSION: 1.  Wall thickening and mucosal enhancement of the urinary bladder likely represent cystitis. A Valentine catheter is present. 2.  No renal or ureteral calculi. No hydronephrosis or hydroureter. 3.  Large amount of stool in the rectum.       Post Discharge Medication Reconciliation Status: discharge medications reconciled and changed, per note/orders (see AVS)      NICKOLAS Alvarado

## 2021-06-19 NOTE — LETTER
Letter by Anuradha Barrett CNP at      Author: Anuradha Barrett CNP Service: -- Author Type: --    Filed:  Encounter Date: 10/29/2019 Status: Signed         Patient: Rashad Caputo   MR Number: [id number]   YOB: 1950   Date of Visit: 10/29/2019       [facility]      NAME:  Rashad Caputo             :  1950    MRN: [id number]    CODE STATUS:  FULL CODE    FACILITY: [facility] [[id number]]         CHIEF COMPLAIN/REASON FOR VISIT:  Chief Complaint   Patient presents with   ? Review Of Multiple Medical Conditions       HISTORY OF PRESENT ILLNESS: Rashad Caputo is a 69 y.o. male being seen for review of multiple medical conditions. Seen today, up in  after planned therapy.  Patient presented to the hospital with acute urinary retention.  He had a Shen catheter placed and currently discharged with an indwelling Shen catheter.  He will follow with urology for a voiding trial. He was noted to have hematuria on , improved today. He developed a UTI cultures grew strep viridans.  He was given Levaquin 7 days post discharge.He has bowel issues of fecal incontinence, ongoing. Reports some pain to left knee, managed with analgesics.   He was to go to  for Folay removal today, but once again cancelled his apt. He was educated ( again) on risk benefits of prolonged catheter use. He reports to me that he promises to have it removed by next Tuesday.   He does continue with therapies and SN on TCU for management of chronic disease status as well as assist in ADLS and bladder care. Sw has been searching for dc planning as pt will require AL. Rashad reports overall feeling of well being with pain managed. OT is now following for  lymph wraps. Pt was to have shen our per  orders. He does continue to refuse.    Allergies   Allergen Reactions   ? Lisinopril Shortness Of Breath and Dizziness   ? Fosinopril Sodium Other (See Comments) and Dizziness     Mouth  numbness   ? Amoxicillin-Pot Clavulanate Hives and Rash   ? Cephalosporins Hives and Rash   ? Penicillins Rash   :     Current Outpatient Medications   Medication Sig   ? acetaminophen (TYLENOL) 500 MG tablet Take 1-2 tablets (500-1,000 mg total) by mouth every 4 (four) hours as needed.   ? cyclobenzaprine (FLEXERIL) 10 MG tablet Take 1 tablet (10 mg total) by mouth 2 (two) times a day as needed for muscle spasms.   ? fluticasone propionate (FLONASE) 50 mcg/actuation nasal spray Apply 1 spray into each nostril 3 (three) times a day as needed. For overuse of nasal decongestant.   ? furosemide (LASIX) 20 MG tablet Take 20 mg by mouth daily.   ? magnesium oxide (MAG-OX) 400 mg (241.3 mg magnesium) tablet Take 400 mg by mouth daily.   ? naproxen sodium (ALEVE) 220 MG tablet Take 220 mg by mouth at bedtime.   ? phenylephrine (NICOLE-SYNEPHRINE) 1 % Spry 1-2 sprays into each nostril every 4 (four) hours as needed.          ? polyethylene glycol (MIRALAX) 17 gram packet Take 1 packet (17 g total) by mouth daily.   ? sodium chloride (OCEAN) 0.65 % nasal spray 1 spray into each nostril as needed for congestion. Use with phenylephrine spray.   ? tamsulosin (FLOMAX) 0.4 mg cap Take 0.4 mg by mouth.         REVIEW OF SYSTEMS:    Currently, no fever, chills, or rigors. Does not have any visual or hearing problems. Denies any chest pain, headaches, palpitations, lightheadedness, dizziness, shortness of breath, or cough. Appetite is good. Denies any GERD symptoms. Denies any difficulty with swallowing, nausea, or vomiting.  Denies any abdominal pain, was constipated now with loose stools d/t prune juice. Denies any urinary symptoms other then retention,. No insomnia. No active bleeding other then ongoing hematuria. No rash.       PHYSICAL EXAMINATION:  Vitals:    10/29/19 1808   BP: 163/83   Pulse: 97   Temp: 96.6  F (35.9  C)   Weight: (!) 222 lb 12.8 oz (101.1 kg)         GENERAL: Awake, Alert, oriented x3,unkempt in appearance,   not in any form of acute distress, answers questions appropriately, follows simple commands, conversant  HEENT: Head is normocephalic with normal hair distribution. No evidence of trauma. Ears: No acute purulent discharge. Eyes: Conjunctivae pink with no scleral jaundice. Nose: Normal mucosa and septum. NECK: Supple with no cervical or supraclavicular lymphadenopathy. Trachea is midline.   CHEST: No tenderness or deformity, no crepitus  LUNG: Clear to auscultation with good chest expansion. There are no crackles or wheezes, normal AP diameter.  BACK: No kyphosis of the thoracic spine. Symmetric, no curvature, ROM normal, no CVA tenderness, no spinal tenderness   CVS: There is good S1  S2,  rhythm is regular.  ABDOMEN: Globular and soft, nontender to palpation, non distended, no masses, no organomegaly, good bowel sounds, no rebound or guarding, no peritoneal signs.   EXTREMITIES: ROM UE WNL, he is unable to bear wt on legs. In wc. Pedal pulses present, pedal  edema, arthritic aged joint swelling, right knee very edematous. .Trace edema bilaterally  SKIN: Warm and dry, no erythema noted, no rashes or lesions.  NEUROLOGICAL: The patient is oriented to person, place and time. Strength and sensation are grossly intact. Face is symmetric.          LABS:    Lab Results   Component Value Date    WBC 8.6 10/10/2019    HGB 8.9 (L) 10/10/2019    HCT 28.8 (L) 10/10/2019    MCV 85 10/10/2019     10/10/2019       Results for orders placed or performed in visit on 10/17/19   Basic Metabolic Panel   Result Value Ref Range    Sodium 140 136 - 145 mmol/L    Potassium 4.6 3.5 - 5.0 mmol/L    Chloride 106 98 - 107 mmol/L    CO2 29 22 - 31 mmol/L    Anion Gap, Calculation 5 5 - 18 mmol/L    Glucose 97 70 - 125 mg/dL    Calcium 8.9 8.5 - 10.5 mg/dL    BUN 34 (H) 8 - 22 mg/dL    Creatinine 0.69 (L) 0.70 - 1.30 mg/dL    GFR MDRD Af Amer >60 >60 mL/min/1.73m2    GFR MDRD Non Af Amer >60 >60 mL/min/1.73m2           No results found  for: HGBA1C  No results found for: FJICFJRE90NM  No results found for: PACABMSS07    ASSESSMENT/PLAN:  1. Urinary retention    2. Debilitated patient    3. Insomnia, unspecified type        1. Urinary retention: Was to have shen removed, pt refused. He reports he did set up an appointment with  for removal on Friday, fu to see if he carries through.    2.Pt was a FTH prior to admission,DEBILITATED  now receiving ADL care as well as proper nutrient, gaining wt and thriving. DC planning ongoing and he needs a more structured living environment, Finances is a major road block as well as denials to several referrals for accepting pt.    3. Insomnia: suggestion of Seroquel from Dr Cisneros, however pt declined  Offer. Education on med reviewed. He declines offer of medication for sleep, antianxiety or mood stabilizer. Followed by psych services with Dr Cisneros.    Electronically signed by:  Anuradha Barrett CNP  This progress note was completed using Dragon software and there may be grammatical errors.

## 2021-06-19 NOTE — LETTER
Letter by Juana Le [employer] at      Author: Juana Le [employer] Service: -- Author Type: --    Filed:  Encounter Date: 10/24/2019 Status: Signed         Patient: Rashad Caputo   MR Number: [id number]   YOB: 1950   Date of Visit: 10/24/2019       [facility] Admission note      Patient: Rashad Caputo  MRN: [id number]  Date of Service: 10/24/2019      [facility] [[id number]]  Reason for Visit     Chief Complaint   Patient presents with   ? Review Of Multiple Medical Conditions       Code Status     Full code    Assessment     -Acute urinary retention status post Valentine catheter placement  -Acute back pain with underlying history of severe spinal stenoses/kyphoscoliosis with postural instability noted on exam  -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  -Vulnerable adult  -Failure to thrive with difficulty functioning in his home environment.  Multiple ER visits noted with 6 emergency room visits noted     Plan     Patient has been admitted to the TCU.  He was examined at his request.  He currently has an indwelling Valentine catheter.  He attributes his retention issues to underlying history of spinal stenoses.  He was seen by urology and a Valentine catheter has been replaced.  He will have another repeat voiding trial in 4 to 6 weeks unfortunately repeating voiding trial in the TCU was not successful.  I had a cornel discussion with him regarding his progressive weight gain.  He was admitted with a weight of 180 pounds and currently stopping at 220 pounds.  I do not think this is fluid overload  this appears to be more of her progressive weight gain which he agrees due to excessive caloric intake.  He told me he has been through a very stressful time recently.  He was at home he was not taking care of himself and his wife had dementia.  He was unable to provide himself enough food and now that he is in a care center he is eating excessively.  He was counseled that this may not be a good option for him.  We also talked about his insomnia concern and mood and behaviors he has been seeing psych he seems to have some adjustment reaction and has had several behavioral outburst in the TCU.  He unfortunately does not want to try an antidepressant he feels his mood is stable he would let me know however if he needs to he does admit that he is having significant psychosocial stressors as he and his wife both are looking at alternative placement and understand that he cannot go back to his own home.  Continue to monitor his mood and behaviors   total time spent is 35 minutes greater than 25 minutes spent face-to-face talking to this patient regarding his weight gain with excessive caloric intake and mood and behavioral issues including behavioral outbursts that he has had in his TCU.  Patient will let me know if he is agreeable to starting any medication to help him with coping with his situational stressors      History     Patient is a very pleasant 69 y.o. male who is admitted to TCU  Patient presented to the hospital with acute urinary retention.  He had a Valentine catheter placed and currently discharged with an indwelling Valentine catheter.  He will follow with urology for a voiding trial.  Apparently at the request he was given a liter voiding trial in the TCU and failed and has an indwelling Valentine catheter they are recommending that he should have a repeat voiding trial in 4 to 6 weeks time.  Patient has had progressive weight gain secondary to high caloric intake.  He reports that he is eating excessively.   Apparently when he was at home he was unable to provide himself enough caloric intake as well as his wife had dementia and was not able to help him either.  He reports that he has been eating excessively which has resulted in a significant weight gain he had developed some lymphedema and was given some wraps.  His right knee continues to be quite significantly osteoarthritic.  He has developed contractures with deformity in his right leg.  Pain management reviewed and he says his pain is stable   he continues to have some mood disorder.  Psychology had recommended SSRI for him.  However he is refusing to agree to anything he told me he is going through pretty stressful time.  His wife is awaiting placement.  He himself is looking at alternative placement however he thinks he would like to wait    Past Medical History     Active Ambulatory (Non-Hospital) Problems    Diagnosis   ? Fluid overload   ? Lymphedema   ? Allergic rhinitis   ? H/O noncompliance with medical treatment, presenting hazards to health   ? Failure to thrive in adult   ? Cognitive impairment   ? Weight gain   ? Stool incontinence   ? Insomnia   ? Discharge planning issues   ? Debilitated patient   ? Urinary retention   ? Urinary obstruction   ? Urinary tract infection   ? Benign prostatic hyperplasia with lower urinary tract symptoms   ? Post-void dribbling   ? Rhinitis medicamentosa   ? Sepsis due to urinary tract infection (H)   ? Sepsis (H)   ? CAD (coronary artery disease)   ? Non-STEMI (non-ST elevated myocardial infarction) (H)   ? Tachycardia   ? Anxiety   ? Spinal stenosis   ? Hypertension   ? Benign Prostatic Hypertrophy   ? Myalgia And Myositis   ? Urinary Tract Infection   ? Feelings Of Urinary Urgency   ? Joint Pain, Localized In The Hip     Past Medical History:   Diagnosis Date   ? Arthritis    ? Benign prostatic hyperplasia with lower urinary tract symptoms 2/23/2017   ? BPH (benign prostatic hypertrophy)    ? Coronary artery disease     ? History of transfusion 1975   ? Hypertension    ? Pneumonia 1980   ? Post-void dribbling 2/23/2017   ? Retinal tear of right eye    ? Rhinitis medicamentosa 2/23/2017   ? Spinal stenosis    ? UTI (urinary tract infection)        Past Social History     Reviewed, and he  reports that he quit smoking about 34 years ago. He has never used smokeless tobacco. He reports that he does not drink alcohol or use drugs.    Family History     Reviewed, and includes cataracts and CVD in his father; his mother had dementia; grandmother had DM    Medication List     Current Outpatient Medications on File Prior to Visit   Medication Sig Dispense Refill   ? acetaminophen (TYLENOL) 500 MG tablet Take 1-2 tablets (500-1,000 mg total) by mouth every 4 (four) hours as needed.  0   ? cyclobenzaprine (FLEXERIL) 10 MG tablet Take 1 tablet (10 mg total) by mouth 2 (two) times a day as needed for muscle spasms. 10 tablet 0   ? fluticasone propionate (FLONASE) 50 mcg/actuation nasal spray Apply 1 spray into each nostril 3 (three) times a day as needed. For overuse of nasal decongestant.     ? furosemide (LASIX) 20 MG tablet Take 20 mg by mouth daily.     ? magnesium oxide (MAG-OX) 400 mg (241.3 mg magnesium) tablet Take 400 mg by mouth daily.     ? melatonin 3 mg Tab tablet Take 5 mg by mouth at bedtime as needed.     ? naproxen sodium (ALEVE) 220 MG tablet Take 220 mg by mouth at bedtime.     ? phenylephrine (NICOLE-SYNEPHRINE) 1 % Spry 1-2 sprays into each nostril every 4 (four) hours as needed.            ? polyethylene glycol (MIRALAX) 17 gram packet Take 1 packet (17 g total) by mouth daily.  0   ? sodium chloride (OCEAN) 0.65 % nasal spray 1 spray into each nostril as needed for congestion. Use with phenylephrine spray.     ? tamsulosin (FLOMAX) 0.4 mg cap Take 0.4 mg by mouth.       No current facility-administered medications on file prior to visit.        Allergies     Allergies   Allergen Reactions   ? Lisinopril Shortness Of  Breath and Dizziness   ? Fosinopril Sodium Other (See Comments) and Dizziness     Mouth numbness   ? Amoxicillin-Pot Clavulanate Hives and Rash   ? Cephalosporins Hives and Rash   ? Penicillins Rash       Review of Systems   A comprehensive review of 14 systems was done. Pertinent findings noted here and in history of present illness. All the rest negative.  Constitutional: Negative.  Negative for fever, chills, activity change, appetite change and fatigue  He is having significant weight gain of almost 40 pounds since admission.   HENT: Negative for congestion and facial swelling.    Eyes: Negative for photophobia, redness and visual disturbance.   Respiratory: Negative for cough and chest tightness.    Cardiovascular: Negative for chest pain, palpitations and leg swelling.   Gastrointestinal: Negative for nausea, diarrhea, constipation, blood in stool and abdominal distention.   Genitourinary: Negative.    Musculoskeletal: Negative.    Skin: Negative.  Hyperpigmented rescaling noted in both of his hands.  Patient told me it is from chronic handwashing  Neurological: Negative for dizziness, tremors, syncope, weakness, light-headedness and headaches.   Hematological: Does not bruise/bleed easily.   Psychiatric/Behavioral: Negative.  Patient is reporting insomnia.  Mood and behaviors have shown some dysregulation with outbursts reported by staff      Physical Exam     Recent Vitals 10/21/2019   Weight 218 lbs 10 oz   BSA (m2) 2.2 m2   /70   Pulse 94   Temp 96.8   SpO2 -   Some recent data might be hidden   Recheck weight now is 221 pounds    Constitutional: Oriented to person, place, and time and appears well-developed.   Looks very disabled and deconditioned  HEENT:  Normocephalic and atraumatic.  Eyes: Conjunctivae and EOM are normal. Pupils are equal, round, and reactive to light. No discharge.  No scleral icterus. Nose normal. Mouth/Throat: Oropharynx is clear and moist. No oropharyngeal exudate.    NECK:  Normal range of motion. Neck supple. No JVD present. No tracheal deviation present. No thyromegaly present.   CARDIOVASCULAR: Normal rate, regular rhythm and intact distal pulses.  Exam reveals no gallop and no friction rub.  Systolic murmur present.  PULMONARY: Effort normal and breath sounds normal. No respiratory distress.No Wheezing or rales.  ABDOMEN: Soft. Bowel sounds are normal. No distension and no mass.  There is no tenderness. There is no rebound and no guarding. No HSM.  Open area noted on his right buttock which appears to be chronic with hyperpigmented changes noted with irritation on his buttocks  MUSCULOSKELETAL: Normal range of motion. 1+ edema and no tenderness. Mild kyphosis, no tenderness.  Right lower extremity is swollen with profound arthritic changes noted with swelling in his right knee.  On standing leg is crooked and patient is not able to stand up straight.  Profound kyphoscoliosis of his back also noted and he could not straighten himself up he was in severe pain  LYMPH NODES: Has no cervical, supraclavicular, axillary and groin adenopathy.   NEUROLOGICAL: Alert and oriented to person, place, and time. No cranial nerve deficit.  Normal muscle tone. Coordination normal.   GENITOURINARY: Deferred exam.  Vlaentine present  SKIN: Skin is warm and dry. No rash noted. No erythema. No pallor.   Hypopigmentary changes noted in his both fingers and hands which patient attributes to chronic handwashing  EXTREMITIES: No cyanosis, no clubbing,1+ edema. No Deformity.  PSYCHIATRIC: Normal mood, affect and behavior.  Has chronic anxiety      Lab Results       Lab Results   Component Value Date    ALBUMIN 4.0 02/15/2019    ALT 19 02/15/2019    AST 19 02/15/2019    BUN 34 (H) 10/17/2019    CALCIUM 8.9 10/17/2019     10/17/2019    CHOL 132 10/19/2014    CO2 29 10/17/2019    CREATININE 0.69 (L) 10/17/2019    GFRAA >60 10/17/2019    GFRNONAA >60 10/17/2019    GLU 97 10/17/2019    HCT 28.8 (L) 10/10/2019     WBC 8.6 10/10/2019    HGB 8.9 (L) 10/10/2019    MG 1.8 09/08/2019     10/10/2019    K 4.6 10/17/2019    PSA 4.5 02/15/2013     10/17/2019         Post Discharge Medication Reconciliation Status: discharge medications reconciled and changed, per note/orders (see AVS)      NICKOLAS Alvarado

## 2021-06-19 NOTE — LETTER
Letter by Anuradha Barrett CNP at      Author: Anuradha Barrett CNP Service: -- Author Type: --    Filed:  Encounter Date: 2019 Status: (Other)         Patient: Rashad Caputo   MR Number: [id number]   YOB: 1950   Date of Visit: 2019       [facility]      NAME:  Rashad Caputo             :  1950    MRN: [id number]    CODE STATUS:  FULL CODE    FACILITY: [facility] [[id number]]         CHIEF COMPLAIN/REASON FOR VISIT:  Chief Complaint   Patient presents with   ? Review Of Multiple Medical Conditions       HISTORY OF PRESENT ILLNESS: Rashad Caputo is a 69 y.o. male being seen at the request of nursing.Pt with ongoing c/o pain and on call light frequently. He had rounding MD adjust meds on 9/3/19 and also gave pysch consult. Pt unkempt and will not allow staff to assist with bathing, He has several c/o pain today and I discussed several options with him, including going to ED if he felt his needs were not met. He declined ED visit. Patient presented to the hospital with acute urinary retention.  He had a Valentine catheter placed and currently discharged with an indwelling Valentine catheter.  He will follow with urology for a voiding trial. He was noted to have hematuria. He developed a UTI cultures grew strep viridans.  He was given Levaquin 7 days post discharge, WILL [place] REGIME ON TCU.    Allergies   Allergen Reactions   ? Lisinopril Shortness Of Breath and Dizziness   ? Fosinopril Sodium Other (See Comments) and Dizziness     Mouth numbness   ? Amoxicillin-Pot Clavulanate Hives and Rash   ? Cephalosporins Hives and Rash   ? Penicillins Rash   :     Current Outpatient Medications   Medication Sig   ? acetaminophen (TYLENOL) 500 MG tablet Take 1-2 tablets (500-1,000 mg total) by mouth every 4 (four) hours as needed.   ? fluticasone propionate (FLONASE) 50 mcg/actuation nasal spray Apply 1 spray into each nostril 3 (three) times a day as needed.  For overuse of nasal decongestant.   ? levoFLOXacin (LEVAQUIN) 500 MG tablet Take 1 tablet (500 mg total) by mouth Daily at 6:00 am for 7 days.   ? naproxen sodium (ALEVE) 220 MG tablet Take 220 mg by mouth at bedtime.   ? phenylephrine (NICOLE-SYNEPHRINE) 1 % Spry 1-2 sprays into each nostril every 4 (four) hours as needed.          ? polyethylene glycol (MIRALAX) 17 gram packet Take 1 packet (17 g total) by mouth daily.   ? sodium chloride (OCEAN) 0.65 % nasal spray 1 spray into each nostril as needed for congestion. Use with phenylephrine spray.         REVIEW OF SYSTEMS:    Currently, no fever, chills, or rigors. Does not have any visual or hearing problems. Denies any chest pain, headaches, palpitations, lightheadedness, dizziness, shortness of breath, or cough. Appetite is good. Denies any GERD symptoms. Denies any difficulty with swallowing, nausea, or vomiting.  Denies any abdominal pain, diarrhea or constipation. Denies any urinary symptoms other then retention,. No insomnia. No active bleeding. No rash.       PHYSICAL EXAMINATION:  Vitals:    09/04/19 1825   BP: 145/84   Pulse: 93   Temp: 96.8  F (36  C)   Weight: 171 lb 3.2 oz (77.7 kg)         GENERAL: Awake, Alert, oriented x3,unkempt in appearance,  not in any form of acute distress, answers questions appropriately, follows simple commands, conversant  HEENT: Head is normocephalic with normal hair distribution. No evidence of trauma. Ears: No acute purulent discharge. Eyes: Conjunctivae pink with no scleral jaundice. Nose: Normal mucosa and septum. NECK: Supple with no cervical or supraclavicular lymphadenopathy. Trachea is midline.   CHEST: No tenderness or deformity, no crepitus  LUNG: Clear to auscultation with good chest expansion. There are no crackles or wheezes, normal AP diameter.  BACK: No kyphosis of the thoracic spine. Symmetric, no curvature, ROM normal, no CVA tenderness, no spinal tenderness   CVS: There is good S1  S2,  rhythm is  regular.  ABDOMEN: Globular and soft, nontender to palpation, non distended, no masses, no organomegaly, good bowel sounds, no rebound or guarding, no peritoneal signs.   EXTREMITIES: ROM UE WNL, he is unable to bear wt on legs. In wc. Pedal pulses present, no edema.  SKIN: Warm and dry, no erythema noted, no rashes or lesions.  NEUROLOGICAL: The patient is oriented to person, place and time. Strength and sensation are grossly intact. Face is symmetric.                    LABS:    Lab Results   Component Value Date    WBC 8.9 08/28/2019    HGB 10.0 (L) 08/29/2019    HCT 36.7 (L) 08/28/2019    MCV 84 08/28/2019     08/28/2019       Results for orders placed or performed during the hospital encounter of 08/27/19   Basic Metabolic Panel   Result Value Ref Range    Sodium 140 136 - 145 mmol/L    Potassium 3.9 3.5 - 5.0 mmol/L    Chloride 107 98 - 107 mmol/L    CO2 23 22 - 31 mmol/L    Anion Gap, Calculation 10 5 - 18 mmol/L    Glucose 93 70 - 125 mg/dL    Calcium 9.1 8.5 - 10.5 mg/dL    BUN 18 8 - 22 mg/dL    Creatinine 0.61 (L) 0.70 - 1.30 mg/dL    GFR MDRD Af Amer >60 >60 mL/min/1.73m2    GFR MDRD Non Af Amer >60 >60 mL/min/1.73m2           No results found for: HGBA1C  No results found for: [id number]  No results found for: BLXBESLX22    ASSESSMENT/PLAN:  1. Pain in joint involving pelvic region and thigh, unspecified laterality    2. Urinary obstruction      1. Pain management: Pt reports ongoing hip, back and leg pain. We did order some voltaren and he was offerred to go to ER for eval, he declined. I talked with therapy and we did review diathermy, PT will see him this am and start this therapy on him.    3. Urinary obstruction: Valentine in place, hematuria present. Encouraged fluid and request nursing assure he has a secure strap in place.      Electronically signed by:  Anuradha Barrett CNP  This progress note was completed using Dragon software and there may be grammatical errors.

## 2021-06-19 NOTE — LETTER
Letter by Anuradha Barrett CNP at      Author: Anuradha Barrett CNP Service: -- Author Type: --    Filed:  Encounter Date: 2019 Status: Signed         Patient: Rashad Caputo   MR Number: [id number]   YOB: 1950   Date of Visit: 2019       [facility]      NAME:  Rashad Caputo             :  1950    MRN: [id number]    CODE STATUS:  FULL CODE    FACILITY: [facility] [[id number]]         CHIEF COMPLAIN/REASON FOR VISIT:  Chief Complaint   Patient presents with   ? Problem Visit       HISTORY OF PRESENT ILLNESS: Rashad Caputo is a 69 y.o. male being seen for review of multiple medical conditions,.Nurses report that Rashad felt he was having a stroke and he couldn't speak other than garbled words. B/P  Pulse reviewed, stable. Speaking articulatley this am. He was up until 5 am and is sleepy, I suspect this is related to his speech change and fatigue. . He has a shen in place, dark sabrina with sedemit.  Per PMH: Patient presented to the hospital with acute urinary retention.  He had a Shen catheter placed and currently discharged with an indwelling Shen catheter.  He will follow with urology for a voiding trial. He was noted to have hematuria on , improved today. He developed a UTI cultures grew strep viridans.  He is currently done with abx treatment, pain stable.    Allergies   Allergen Reactions   ? Lisinopril Shortness Of Breath and Dizziness   ? Fosinopril Sodium Other (See Comments) and Dizziness     Mouth numbness   ? Tramadol Other (See Comments)     sweating   ? Amoxicillin-Pot Clavulanate Hives and Rash   ? Cephalosporins Hives and Rash   ? Penicillins Rash   :     Current Outpatient Medications   Medication Sig   ? acetaminophen (TYLENOL) 325 MG tablet Take 650 mg by mouth every 6 (six) hours as needed.    ? cyclobenzaprine (FLEXERIL) 10 MG tablet Take 1 tablet (10 mg total) by mouth 2 (two) times a day as needed for muscle  spasms.   ? diclofenac sodium (VOLTAREN) 1 % Gel Apply 1 g topically 3 (three) times a day. Apply to bilateral knees/hips   ? fluticasone propionate (FLONASE) 50 mcg/actuation nasal spray Apply 1 spray into each nostril every 8 (eight) hours as needed. For overuse of nasal decongestant.          ? furosemide (LASIX) 20 MG tablet Take 40 mg by mouth 2 (two) times a day at 9am and 6pm.    ? furosemide (LASIX) 40 MG tablet Take 80 mg by mouth in the morning and 40 mg by mouth at night for 1 week.   ? gabapentin (NEURONTIN) 100 MG capsule Take 200 mg by mouth 3 (three) times a day.   ? magnesium oxide (MAG-OX) 400 mg (241.3 mg magnesium) tablet Take 400 mg by mouth daily.   ? naproxen sodium (ALEVE) 220 MG tablet Take 220 mg by mouth 2 (two) times a day with meals.          ? phenylephrine (NICOLE-SYNEPHRINE) 1 % Spry 1-2 sprays into each nostril every 4 (four) hours as needed.          ? polyethylene glycol (MIRALAX) 17 gram packet Take 1 packet (17 g total) by mouth daily.   ? QUEtiapine (SEROQUEL) 50 MG tablet Take 50 mg by mouth at bedtime.   ? sodium chloride (OCEAN) 0.65 % nasal spray 1 spray into each nostril as needed for congestion. Use with phenylephrine spray.   ? tamsulosin (FLOMAX) 0.4 mg cap Take 0.4 mg by mouth Daily after breakfast.                REVIEW OF SYSTEMS:    Currently, no fever, chills, or rigors. Does not have any visual or hearing problems. Denies any chest pain, headaches, palpitations, lightheadedness, dizziness, shortness of breath, or cough. Appetite is good. Denies any GERD symptoms. Denies any difficulty with swallowing, nausea, or vomiting.  Denies any abdominal pain, was constipated now with loose stools d/t prune juice. Denies any urinary symptoms other then retention,. No insomnia. No active bleeding . No rash. Pain to left arm      PHYSICAL EXAMINATION:  Vitals:    12/09/19 1327   BP: 135/84   Pulse: 80   Weight: (!) 252 lb 3.2 oz (114.4 kg)         GENERAL: Awake, Alert, oriented  x3,unkempt in appearance,  not in any form of acute distress, answers questions appropriately, follows simple commands, conversant  HEENT: Head is normocephalic with normal hair distribution. No evidence of trauma. Ears: No acute purulent discharge. Eyes: Conjunctivae pink with no scleral jaundice. Nose: Normal mucosa and septum. NECK: Supple with no cervical or supraclavicular lymphadenopathy. Trachea is midline.   CHEST: No tenderness or deformity, no crepitus  LUNG: Clear to auscultation with good chest expansion. There are no crackles or wheezes, normal AP diameter.  BACK: No kyphosis of the thoracic spine. Symmetric, no curvature, ROM normal, no CVA tenderness, no spinal tenderness   CVS: There is good S1  S2,  rhythm is regular.  ABDOMEN: Globular and soft, nontender to palpation, non distended, no masses, no organomegaly, good bowel sounds, no rebound or guarding, no peritoneal signs.   EXTREMITIES: ROM UE WNL, left arm with increasedSwelling, lymphedema to legs  SKIN: Warm and dry, no erythema noted, no rashes or lesions.  NEUROLOGICAL: The patient is oriented to person, place and time. Strength and sensation are grossly intact. Face is symmetric.          LABS:    Lab Results   Component Value Date    WBC 5.6 12/09/2019    HGB 9.4 (L) 12/09/2019    HCT 30.3 (L) 12/09/2019    MCV 83 12/09/2019     12/09/2019       Results for orders placed or performed in visit on 12/03/19   Basic Metabolic Panel   Result Value Ref Range    Sodium 140 136 - 145 mmol/L    Potassium 4.4 3.5 - 5.0 mmol/L    Chloride 104 98 - 107 mmol/L    CO2 28 22 - 31 mmol/L    Anion Gap, Calculation 8 5 - 18 mmol/L    Glucose 95 70 - 125 mg/dL    Calcium 9.3 8.5 - 10.5 mg/dL    BUN 39 (H) 8 - 22 mg/dL    Creatinine 0.78 0.70 - 1.30 mg/dL    GFR MDRD Af Amer >60 >60 mL/min/1.73m2    GFR MDRD Non Af Amer >60 >60 mL/min/1.73m2           No results found for: HGBA1C  No results found for: [id number]  No results found for:  GINVOZTP06    ASSESSMENT/PLAN:  1. Left arm swelling    2. Debilitated patient      1. Continues with fluid overload and wt gain. We recently doubled his lasix . Gradual trend up with rapid wt increase 30 pounds in one month. Did go to ED but was sent back same day with relatively same orders. I am ordering labs today as he did feel his voice is more slurred, his BP/P is stable, clear articulation at this time.  D dimer, BNP, BMP and CBC to be drawn this am.    2. Debilitated pt. He continues with therapy , PT X3 weekly. 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            Electronically signed by:  Anuradha Barrett CNP  This progress note was completed using Dragon software and there may be grammatical errors.

## 2021-06-19 NOTE — LETTER
Letter by Anuradha Barrett CNP at      Author: Anuradha Barrett CNP Service: -- Author Type: --    Filed:  Encounter Date: 2019 Status: Signed         Patient: Rashad Caputo   MR Number: [id number]   YOB: 1950   Date of Visit: 2019       [facility]      NAME:  Rashad Caputo             :  1950    MRN: [id number]    CODE STATUS:  FULL CODE    FACILITY: [facility] [[id number]]         CHIEF COMPLAIN/REASON FOR VISIT:  Chief Complaint   Patient presents with   ? Problem Visit       HISTORY OF PRESENT ILLNESS: Rashad Caputo is a 69 y.o. male being seen per request of nursing due to swollen left arm. . Seen today,in room , moaning in pain, reports his left arm is very painful. It is noted to be warm and swollen. Per PMH: Patient presented to the hospital with acute urinary retention.  He had a Shen catheter placed and currently discharged with an indwelling Shen catheter.  He will follow with urology for a voiding trial. He was noted to have hematuria on , improved today. He developed a UTI cultures grew strep viridans.  He was given Levaquin 7 days post discharge.He has bowel issues of fecal incontinence, ongoing. Reports some pain to left knee, managed with analgesics.   He was to go to  for Folay removal today, but once again cancelled his apt. He was educated ( again) on risk benefits of prolonged catheter use. He reports to me that he promises to have it removed by next Tuesday.   He does continue with therapies and SN on TCU for management of chronic disease status as well as assist in ADLS and bladder care. Sw has been searching for dc planning as pt will require AL. Rashad reports overall feeling of well being with pain managed. OT is now following for  lymph wraps. Pt was to have shen our per  orders, he did have an apointment with  this am for shen removal, but in lieu of increased arm pain and swelling he will need an  RUSTY kim for futher diagnostics.     Allergies   Allergen Reactions   ? Lisinopril Shortness Of Breath and Dizziness   ? Fosinopril Sodium Other (See Comments) and Dizziness     Mouth numbness   ? Amoxicillin-Pot Clavulanate Hives and Rash   ? Cephalosporins Hives and Rash   ? Penicillins Rash   :     Current Outpatient Medications   Medication Sig   ? acetaminophen (TYLENOL) 325 MG tablet Take 650 mg by mouth 4 (four) times a day.   ? acetaminophen (TYLENOL) 500 MG tablet Take 500-1,000 mg by mouth every 4 (four) hours as needed for pain. Do not exceed 4000 mg in 24 hours   ? cyclobenzaprine (FLEXERIL) 10 MG tablet Take 1 tablet (10 mg total) by mouth 2 (two) times a day as needed for muscle spasms.   ? diclofenac sodium (VOLTAREN) 1 % Gel Apply 1 g topically 3 (three) times a day. Apply to bilateral knees/hips   ? diclofenac sodium (VOLTAREN) 1 % Gel Apply 1 g topically as needed. To hips/knees/ as needed   ? fluticasone propionate (FLONASE) 50 mcg/actuation nasal spray Apply 1 spray into each nostril every 8 (eight) hours as needed. For overuse of nasal decongestant.          ? furosemide (LASIX) 20 MG tablet Take 20 mg by mouth daily.   ? gabapentin (NEURONTIN) 100 MG capsule Take 100 mg by mouth 2 (two) times a day.   ? magnesium oxide (MAG-OX) 400 mg (241.3 mg magnesium) tablet Take 400 mg by mouth daily.   ? naproxen sodium (ALEVE) 220 MG tablet Take 220 mg by mouth 2 (two) times a day with meals.          ? phenylephrine (NICOLE-SYNEPHRINE) 1 % Spry 1-2 sprays into each nostril every 4 (four) hours as needed.          ? polyethylene glycol (MIRALAX) 17 gram packet Take 1 packet (17 g total) by mouth daily.   ? potassium chloride (K-DUR,KLOR-CON) 20 MEQ tablet Take 20 mEq by mouth daily.   ? sodium chloride (OCEAN) 0.65 % nasal spray 1 spray into each nostril as needed for congestion. Use with phenylephrine spray.   ? tamsulosin (FLOMAX) 0.4 mg cap Take 0.4 mg by mouth Daily after breakfast.          ? tiZANidine  (ZANAFLEX) 4 MG tablet Take 4 mg by mouth twice a day with lunch and supper.         REVIEW OF SYSTEMS:    Currently, no fever, chills, or rigors. Does not have any visual or hearing problems. Denies any chest pain, headaches, palpitations, lightheadedness, dizziness, shortness of breath, or cough. Appetite is good. Denies any GERD symptoms. Denies any difficulty with swallowing, nausea, or vomiting.  Denies any abdominal pain, was constipated now with loose stools d/t prune juice. Denies any urinary symptoms other then retention,. No insomnia. No active bleeding other then ongoing hematuria. No rash.       PHYSICAL EXAMINATION:  Vitals:    11/01/19 1525   BP: 140/70   Pulse: 100   Temp: 99  F (37.2  C)         GENERAL: Awake, Alert, oriented x3,unkempt in appearance,  not in any form of acute distress, answers questions appropriately, follows simple commands, conversant  HEENT: Head is normocephalic with normal hair distribution. No evidence of trauma. Ears: No acute purulent discharge. Eyes: Conjunctivae pink with no scleral jaundice. Nose: Normal mucosa and septum. NECK: Supple with no cervical or supraclavicular lymphadenopathy. Trachea is midline.   CHEST: No tenderness or deformity, no crepitus  LUNG: Clear to auscultation with good chest expansion. There are no crackles or wheezes, normal AP diameter.  BACK: No kyphosis of the thoracic spine. Symmetric, no curvature, ROM normal, no CVA tenderness, no spinal tenderness   CVS: There is good S1  S2,  rhythm is regular.  ABDOMEN: Globular and soft, nontender to palpation, non distended, no masses, no organomegaly, good bowel sounds, no rebound or guarding, no peritoneal signs.   EXTREMITIES: ROM UE WNL, left arm with increased pain and Swelling  SKIN: Warm and dry, no erythema noted, no rashes or lesions.  NEUROLOGICAL: The patient is oriented to person, place and time. Strength and sensation are grossly intact. Face is symmetric.          LABS:    Lab Results    Component Value Date    WBC 8.7 11/01/2019    HGB 9.8 (L) 11/01/2019    HCT 30.9 (L) 11/01/2019    MCV 83 11/01/2019     11/01/2019       Results for orders placed or performed in visit on 10/17/19   Basic Metabolic Panel   Result Value Ref Range    Sodium 140 136 - 145 mmol/L    Potassium 4.6 3.5 - 5.0 mmol/L    Chloride 106 98 - 107 mmol/L    CO2 29 22 - 31 mmol/L    Anion Gap, Calculation 5 5 - 18 mmol/L    Glucose 97 70 - 125 mg/dL    Calcium 8.9 8.5 - 10.5 mg/dL    BUN 34 (H) 8 - 22 mg/dL    Creatinine 0.69 (L) 0.70 - 1.30 mg/dL    GFR MDRD Af Amer >60 >60 mL/min/1.73m2    GFR MDRD Non Af Amer >60 >60 mL/min/1.73m2           No results found for: HGBA1C  No results found for: [id number]  No results found for: ELKIYQSB30    ASSESSMENT/PLAN:  1. Left arm swelling      Painful kleft arm swelling, ED to eval and further diagnostic work up needed.  Electronically signed by:  Anuradha Barrett CNP  This progress note was completed using Dragon software and there may be grammatical errors.

## 2021-06-19 NOTE — LETTER
Letter by Anuradha Barrett CNP at      Author: nAuradha Barrett CNP Service: -- Author Type: --    Filed:  Encounter Date: 10/4/2019 Status: Signed         Patient: Rashad Caputo   MR Number: [id number]   YOB: 1950   Date of Visit: 10/4/2019       [facility]      NAME:  Rashad Caputo             :  1950    MRN: [id number]    CODE STATUS:  FULL CODE    FACILITY: [facility] [[id number]]         CHIEF COMPLAIN/REASON FOR VISIT:  Chief Complaint   Patient presents with   ? Review Of Multiple Medical Conditions       HISTORY OF PRESENT ILLNESS: Rashad Caputo is a 69 y.o. male being seen for review of multiple medical conditions. Patient presented to the hospital with acute urinary retention.  He had a Valentine catheter placed and currently discharged with an indwelling Valentine catheter.  He will follow with urology for a voiding trial. He was noted to have hematuria on , improved today. He developed a UTI cultures grew strep viridans.  He was given Levaquin 7 days post discharge.He has bowel issues of fecal incontinence, ongoing. Reports some pain to left knee, managed with analgesics.   He was to go to  for Folay removal today, but once again cancelled his apt. He was educated ( again) on risk benefits of prolonged catheter use. He reports to me that he promises to have it removed by next Tuesday.   He does continue with therapies and SN on TCU for management of chronic disease status as well as assist in ADLS and bladder care.    Allergies   Allergen Reactions   ? Lisinopril Shortness Of Breath and Dizziness   ? Fosinopril Sodium Other (See Comments) and Dizziness     Mouth numbness   ? Amoxicillin-Pot Clavulanate Hives and Rash   ? Cephalosporins Hives and Rash   ? Penicillins Rash   :     Current Outpatient Medications   Medication Sig   ? acetaminophen (TYLENOL) 500 MG tablet Take 1-2 tablets (500-1,000 mg total) by mouth every 4 (four) hours as  needed.   ? cyclobenzaprine (FLEXERIL) 10 MG tablet Take 1 tablet (10 mg total) by mouth 2 (two) times a day as needed for muscle spasms.   ? fluticasone propionate (FLONASE) 50 mcg/actuation nasal spray Apply 1 spray into each nostril 3 (three) times a day as needed. For overuse of nasal decongestant.   ? magnesium oxide (MAG-OX) 400 mg (241.3 mg magnesium) tablet Take 400 mg by mouth daily.   ? melatonin 3 mg Tab tablet Take 5 mg by mouth at bedtime as needed.   ? naproxen sodium (ALEVE) 220 MG tablet Take 220 mg by mouth at bedtime.   ? phenylephrine (NICOLE-SYNEPHRINE) 1 % Spry 1-2 sprays into each nostril every 4 (four) hours as needed.          ? polyethylene glycol (MIRALAX) 17 gram packet Take 1 packet (17 g total) by mouth daily.   ? sodium chloride (OCEAN) 0.65 % nasal spray 1 spray into each nostril as needed for congestion. Use with phenylephrine spray.         REVIEW OF SYSTEMS:    Currently, no fever, chills, or rigors. Does not have any visual or hearing problems. Denies any chest pain, headaches, palpitations, lightheadedness, dizziness, shortness of breath, or cough. Appetite is good. Denies any GERD symptoms. Denies any difficulty with swallowing, nausea, or vomiting.  Denies any abdominal pain, was constipated now with loose stools d/t prune juice. Denies any urinary symptoms other then retention,. No insomnia. No active bleeding other then ongoing hematuria. No rash.       PHYSICAL EXAMINATION:  Vitals:    10/04/19 1402   BP: 129/77   Pulse: 87   Temp: 97.6  F (36.4  C)   Weight: 183 lb 12.8 oz (83.4 kg)         GENERAL: Awake, Alert, oriented x3,unkempt in appearance,  not in any form of acute distress, answers questions appropriately, follows simple commands, conversant  HEENT: Head is normocephalic with normal hair distribution. No evidence of trauma. Ears: No acute purulent discharge. Eyes: Conjunctivae pink with no scleral jaundice. Nose: Normal mucosa and septum. NECK: Supple with no cervical  or supraclavicular lymphadenopathy. Trachea is midline.   CHEST: No tenderness or deformity, no crepitus  LUNG: Clear to auscultation with good chest expansion. There are no crackles or wheezes, normal AP diameter.  BACK: No kyphosis of the thoracic spine. Symmetric, no curvature, ROM normal, no CVA tenderness, no spinal tenderness   CVS: There is good S1  S2,  rhythm is regular.  ABDOMEN: Globular and soft, nontender to palpation, non distended, no masses, no organomegaly, good bowel sounds, no rebound or guarding, no peritoneal signs.   EXTREMITIES: ROM UE WNL, he is unable to bear wt on legs. In wc. Pedal pulses present, no edema, arthritic aged joint swelling, right knee very edematous. .Trace edema bilaterally  SKIN: Warm and dry, no erythema noted, no rashes or lesions.  NEUROLOGICAL: The patient is oriented to person, place and time. Strength and sensation are grossly intact. Face is symmetric.          LABS:    Lab Results   Component Value Date    WBC 7.3 09/08/2019    HGB 11.4 (L) 09/08/2019    HCT 35.2 (L) 09/08/2019    MCV 82 09/08/2019     09/08/2019       Results for orders placed or performed in visit on 10/01/19   Basic Metabolic Panel   Result Value Ref Range    Sodium 139 136 - 145 mmol/L    Potassium 4.4 3.5 - 5.0 mmol/L    Chloride 105 98 - 107 mmol/L    CO2 26 22 - 31 mmol/L    Anion Gap, Calculation 8 5 - 18 mmol/L    Glucose 87 70 - 125 mg/dL    Calcium 9.2 8.5 - 10.5 mg/dL    BUN 31 (H) 8 - 22 mg/dL    Creatinine 0.71 0.70 - 1.30 mg/dL    GFR MDRD Af Amer >60 >60 mL/min/1.73m2    GFR MDRD Non Af Amer >60 >60 mL/min/1.73m2           No results found for: HGBA1C  No results found for: VAPUUARO44ZL  No results found for: YHGTABQG54    ASSESSMENT/PLAN:  1. Urinary retention    2. Debilitated patient      1.  Retention and. Urinary obstruction: Valentine in place, hematuria at random periods, ua was negative. He has been to  on 9/30/19 and we are going to try void trial at end of week, no  refusing trial.   Encouraged fluid     2.Debilitated pt: He continues working with therapy, LT goal of AL upon dc.    Electronically signed by:  Anuradha Barrett CNP  This progress note was completed using Dragon software and there may be grammatical errors.

## 2021-06-19 NOTE — LETTER
Letter by Juana Le MBBS at      Author: Juana Le MBBS Service: -- Author Type: --    Filed:  Encounter Date: 9/10/2019 Status: (Other)         Patient: Rashad Caputo   MR Number: [id number]   YOB: 1950   Date of Visit: 9/10/2019       [facility] Admission note      Patient: Rashad Caputo  MRN: [id number]  Date of Service: 9/10/2019      [facility] [[id number]]  Reason for Visit     Chief Complaint   Patient presents with   ? 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  -Vulnerable adult  -Failure to thrive with difficulty functioning in his home environment.  Multiple ER visits noted with 6 emergency room visits noted     Plan     Patient has been admitted to the TCU.  Catheter issues reviewed with him he does not want a catheter exchange but he does want a UA UC he does not have any dysuria but he wants to ensure that things are good I did tell him that we like to do a catheter exchange he refused after that.  Pain management reviewed he is on Tylenol with naproxen not much  improvement noted he went to the emergency room no improvement there either.  He does not want to use narcotics.  Plan is to order topical Voltaren gel and Lidoderm patches.  Counseled to follow-up with orthopedic surgeon for consideration of either knee replacement arthroplasty versus steroid injection in his knee which are both options which are not acceptable to him at present.  He has had a bad outcome with tramadol also in the past  He is also given Flexeril 10 mg twice daily which she has not used much.  Plan is to give him a trial of gabapentin will add 100 mg twice daily.  Also and Zanaflex 4 mg at noon and 5 PM this is a times when he has maximum muscle pain.  He remains sedentary and poorly ambulatory.   We will also check CK level   labs done in the ER reviewed and they were stable   He unfortunately is not sitting up and lays flat in bed all day long.  He has a sacral pressure sore and he says sitting up is not conducive to him as it causes severe pain in his buttocks  Total time spent is 35 minutes more than 23 minutes spent face-to-face talking to this patient reviewing his pain management with him.  This is outlined in my note above and the changes as made above.  He does not want to try narcotics as yet.  He has tried tramadol with a bad response in the past.  He is somewhat not anxious to follow-up with orthopedics.  He does not want to pursue a steroid injection as he feels this will not be beneficial to him.  History     Patient is a very pleasant 69 y.o. male who is admitted to TCU  Patient presented to the hospital with acute urinary retention.  He had a Valentine catheter placed and currently discharged with an indwelling Valentine catheter.  He will follow with urology for a voiding trial.  He was noted to have hematuria.  Underwent bladder irrigation.  He developed a UTI cultures grew strep viridans.  He was given Levaquin 7 days post discharge.  Today again he is requesting that his UA UC be done  again.  I did review this with him and he has a follow-up appointment with urology this Friday.  I have offered to have a UA UC done at his request but informed him that he would need a catheter exchange he refuses to have that done  He is not reporting any symptoms though.  He has complaints of anterior thigh pain.  He went to the emergency room on 9/8/2019 with that.  He had Doppler ultrasound of bilateral lower extremities which was negative.  Additional work-up included hemoglobin of 11.4 with no leukocytosis BMP was normal magnesium was normal at that point he was disc charged back with Flexeril he is not quite happy   We did have a discussion regarding pain management he is not taking narcotics in the past and does not want to use them as he has a risk of dependency.  Additional support with Tylenol and naproxen has been provided to him.  He does not want to pursue steroids or any intralesional injection.  He feels that will be of no benefit to him.  He is planning to have a knee replacement arthroplasty done soon but so far no decision has been made.  Constipation concerns improving he is refusing to take MiraLAX he said laying in bed and reports that he has severe muscle spasms in the afternoon    Past Medical History     Active Ambulatory (Non-Hospital) Problems    Diagnosis   ? Urinary retention   ? Urinary obstruction   ? Urinary tract infection   ? Benign prostatic hyperplasia with lower urinary tract symptoms   ? Post-void dribbling   ? Rhinitis medicamentosa   ? Sepsis due to urinary tract infection (H)   ? Sepsis (H)   ? CAP (community acquired pneumonia)   ? CAD (coronary artery disease)   ? Non-STEMI (non-ST elevated myocardial infarction) (H)   ? Tachycardia   ? Anxiety   ? Spinal stenosis   ? Hypertension   ? Benign Prostatic Hypertrophy   ? Myalgia And Myositis   ? Urinary Tract Infection   ? Feelings Of Urinary Urgency   ? Joint Pain, Localized In The Hip     Past Medical History:   Diagnosis  Date   ? Arthritis    ? Benign prostatic hyperplasia with lower urinary tract symptoms 2/23/2017   ? BPH (benign prostatic hypertrophy)    ? Coronary artery disease    ? History of transfusion 1975   ? Hypertension    ? Pneumonia 1980   ? Post-void dribbling 2/23/2017   ? Retinal tear of right eye    ? Rhinitis medicamentosa 2/23/2017   ? Spinal stenosis    ? UTI (urinary tract infection)        Past Social History     Reviewed, and he  reports that he quit smoking about 34 years ago. He has never used smokeless tobacco. He reports that he does not drink alcohol or use drugs.    Family History     Reviewed, and includes cataracts and CVD in his father; his mother had dementia; grandmother had DM    Medication List     Current Outpatient Medications on File Prior to Visit   Medication Sig Dispense Refill   ? acetaminophen (TYLENOL) 500 MG tablet Take 1-2 tablets (500-1,000 mg total) by mouth every 4 (four) hours as needed.  0   ? cyclobenzaprine (FLEXERIL) 10 MG tablet Take 1 tablet (10 mg total) by mouth 2 (two) times a day as needed for muscle spasms. 10 tablet 0   ? fluticasone propionate (FLONASE) 50 mcg/actuation nasal spray Apply 1 spray into each nostril 3 (three) times a day as needed. For overuse of nasal decongestant.     ? magnesium oxide (MAG-OX) 400 mg (241.3 mg magnesium) tablet Take 400 mg by mouth daily.     ? naproxen sodium (ALEVE) 220 MG tablet Take 220 mg by mouth at bedtime.     ? phenylephrine (NICOLE-SYNEPHRINE) 1 % Spry 1-2 sprays into each nostril every 4 (four) hours as needed.            ? polyethylene glycol (MIRALAX) 17 gram packet Take 1 packet (17 g total) by mouth daily.  0   ? sodium chloride (OCEAN) 0.65 % nasal spray 1 spray into each nostril as needed for congestion. Use with phenylephrine spray.       No current facility-administered medications on file prior to visit.        Allergies     Allergies   Allergen Reactions   ? Lisinopril Shortness Of Breath and Dizziness   ? Fosinopril  Sodium Other (See Comments) and Dizziness     Mouth numbness   ? Amoxicillin-Pot Clavulanate Hives and Rash   ? Cephalosporins Hives and Rash   ? Penicillins Rash       Review of Systems   A comprehensive review of 14 systems was done. Pertinent findings noted here and in history of present illness. All the rest negative.  Constitutional: Negative.  Negative for fever, chills, activity change, appetite change and fatigue.   HENT: Negative for congestion and facial swelling.    Eyes: Negative for photophobia, redness and visual disturbance.   Respiratory: Negative for cough and chest tightness.    Cardiovascular: Negative for chest pain, palpitations and leg swelling.   Gastrointestinal: Negative for nausea, diarrhea, constipation, blood in stool and abdominal distention.   Genitourinary: Negative.    Musculoskeletal: Negative.    Skin: Negative.  Hyperpigmented rescaling noted in both of his hands.  Patient told me it is from chronic handwashing  Neurological: Negative for dizziness, tremors, syncope, weakness, light-headedness and headaches.   Hematological: Does not bruise/bleed easily.   Psychiatric/Behavioral: Negative.        Physical Exam     Recent Vitals 9/8/2019   Height -   Weight -   BSA (m2) -   BP -   Pulse -   Temp 98.6   Temp src -   SpO2 -   Some recent data might be hidden       Constitutional: Oriented to person, place, and time and appears well-developed.   Looks very disabled and deconditioned  HEENT:  Normocephalic and atraumatic.  Eyes: Conjunctivae and EOM are normal. Pupils are equal, round, and reactive to light. No discharge.  No scleral icterus. Nose normal. Mouth/Throat: Oropharynx is clear and moist. No oropharyngeal exudate.    NECK: Normal range of motion. Neck supple. No JVD present. No tracheal deviation present. No thyromegaly present.   CARDIOVASCULAR: Normal rate, regular rhythm and intact distal pulses.  Exam reveals no gallop and no friction rub.  Systolic murmur  present.  PULMONARY: Effort normal and breath sounds normal. No respiratory distress.No Wheezing or rales.  ABDOMEN: Soft. Bowel sounds are normal. No distension and no mass.  There is no tenderness. There is no rebound and no guarding. No HSM.  Open area noted on his right buttock which appears to be chronic with hyperpigmented changes noted with irritation on his buttocks  MUSCULOSKELETAL: Normal range of motion. No edema and no tenderness. Mild kyphosis, no tenderness.  Right lower extremity is swollen with profound arthritic changes noted with swelling in his right knee.  On standing leg is crooked and patient is not able to stand up straight.  Profound kyphoscoliosis of his back also noted and he could not straighten himself up he was in severe pain  LYMPH NODES: Has no cervical, supraclavicular, axillary and groin adenopathy.   NEUROLOGICAL: Alert and oriented to person, place, and time. No cranial nerve deficit.  Normal muscle tone. Coordination normal.   GENITOURINARY: Deferred exam.  Valentine present  SKIN: Skin is warm and dry. No rash noted. No erythema. No pallor.   Hypopigmentary changes noted in his both fingers and hands which patient attributes to chronic handwashing  EXTREMITIES: No cyanosis, no clubbing, no edema. No Deformity.  PSYCHIATRIC: Normal mood, affect and behavior.      Lab Results       Lab Results   Component Value Date    ALBUMIN 4.0 02/15/2019    ALT 19 02/15/2019    AST 19 02/15/2019    BUN 22 09/08/2019    CALCIUM 9.3 09/08/2019     09/08/2019    CHOL 132 10/19/2014    CO2 25 09/08/2019    CREATININE 0.65 (L) 09/08/2019    GFRAA >60 09/08/2019    GFRNONAA >60 09/08/2019     09/08/2019    HCT 35.2 (L) 09/08/2019    WBC 7.3 09/08/2019    HGB 11.4 (L) 09/08/2019    MG 1.8 09/08/2019     09/08/2019    K 4.6 09/08/2019    PSA 4.5 02/15/2013     09/08/2019             Post Discharge Medication Reconciliation Status: discharge medications reconciled and changed, per  note/orders (see AVS)      NICKOLAS Alvarado

## 2021-06-19 NOTE — LETTER
Letter by Juana Le MBBS at      Author: Juana Le [employer] Service: -- Author Type: --    Filed:  Encounter Date: 11/7/2019 Status: Signed         Patient: Rashad Caputo   MR Number: [id number]   YOB: 1950   Date of Visit: 11/7/2019       [facility] Admission note      Patient: Rashad Caputo  MRN: [id number]  Date of Service: 11/7/2019      [facility] [[id number]]  Reason for Visit     Chief Complaint   Patient presents with   ? Review Of Multiple Medical Conditions       Code Status     Full code    Assessment     -Acute hemorrhagic cystitis with cultures growing more than 100,000 colonies of staph aureus  -Acute onset of left shoulder dislocation/anterior subluxation with no fractures been identified appears to be a long-standing condition  -Acute onset of left upper extremity swelling  -Acute back pain with underlying history of severe spinal stenoses/kyphoscoliosis with postural instability noted on exam  -Acute psychosocial stressors causing significant anxiety patient  -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  -Vulnerable adult  -Failure to thrive with difficulty functioning in his home environment.  Multiple ER visits noted with 6 emergency room visits noted     Plan     Patient has been admitted to the TCU.  He was examined at his request.  He currently has an indwelling Valentine catheter.  He attributes his retention issues to underlying history of spinal stenoses.  Recently seen and diagnosed with staph aureus hemorrhagic cystitis.  He was initially given Levaquin but  switch to Bactrim DS for 7 days based on cultures and sensitivities.  He is not reporting any acute hematuria today.  Unfortunately he did cancel his follow-up appointment with urology he is not sure if he wants to pursue any further surgeries regarding this issue.  We did talk about his left upper extremity swelling.  I suspect this is positional because of dislocation of his shoulder.  He has a follow-up appointment with orthopedics.  Therapy assessment for positioning and possible splinting requested for him.  Advised to use a compression sleeve and elevate his extremity to see if the swelling will come down Doppler ultrasound has been negative.  Suspect this is because of compression with dislocation.  He has multiple severe osteoarthritis with deformities including bilateral shoulder subluxations noted on imaging along with severe osteoarthritis of the right knee with deformity noted he also has chronic back pain issues wiTH severe pain at present he is declining quite rapidly and can barely participate in therapy because of this issue.  We did talk about seeing his surgeon to discuss surgical option however in advanced osteoarthritis I do not know if this would be effective for him.  He is also complaining of multiple psychosocial stressors which are aggravating his mood and causing more anxiety.  He has been seeing psychology and ongoing basis however he does not want to try any medications was encouraged to think about it again.  Total time spent is 35 minutes more than 30 minutes spent face-to-face talking to this patient reviewing his care plan including his recent emergency room visit regarding hemorrhagic cystitis as well as his shoulder issue and because of swelling of his left upper extremity.  Follow-up with orthopedics was encouraged.  He can discuss with them need for injection splinting versus surgical treatment for his shoulder and knee issues he is not very keen but again encouraged to make an  appointment and keep it just to get a second opinion if possible.  Discharge planning also reviewed with him he is moving to another facility soon to be closer to his wife who has been declared a vulnerable adult along with him        History     Patient is a very pleasant 69 y.o. male who is admitted to TCU  Patient had acute onset of left upper extremity pain along with swelling ongoing for a couple of days.  Unfortunately work-up in the TCU was nondiagnostic.  He did not have any DVT edema was pitting and nonresponsive to diuretics he was  Sent to the emergency room for further evaluation and a repeat upper extremity ultrasound did not show any DVT but a questionable left axillary mass.  Subsequently a CT showed an anterior subluxation of the left shoulder.  Orthopedics was consulted.  They have recommended outpatient follow-up with MRI of his left shoulder  He also has an chronic indwelling Valentine catheter.  He was recently seen in the emergency room because of blood in his catheter he was diagnosed with hemorrhagic cystitis and given Levaquin.  He had a follow-up with urology which he chose not to attend.  He has had progressive weight gain of more than 40 pounds with lymphedema he admits he has significant caloric restriction prior to coming to the TCU and has been overeating with excessive caloric intake.  Mood and behaviors were reviewed and he has significant situational stressors with current issues relating to both his home being repossessed and significant alone issues and other psychosocial stressors currently his wife is also in the home he labeled a vulnerable adult and he is having difficulty  dealing with those aspects of his life   He has been seeing psychology has refused any anxiety medications.  He told me that his home has been foreclosed and condemned [facility]    Past Medical History     Active Ambulatory (Non-Hospital) Problems    Diagnosis   ? Left arm swelling   ? Fluid overload   ?  Lymphedema   ? Allergic rhinitis   ? H/O noncompliance with medical treatment, presenting hazards to health   ? Failure to thrive in adult   ? Cognitive impairment   ? Weight gain   ? Stool incontinence   ? Insomnia   ? Discharge planning issues   ? Debilitated patient   ? Urinary retention   ? Urinary obstruction   ? Urinary tract infection   ? Benign prostatic hyperplasia with lower urinary tract symptoms   ? Post-void dribbling   ? Rhinitis medicamentosa   ? Sepsis due to urinary tract infection (H)   ? Sepsis (H)   ? CAD (coronary artery disease)   ? Non-STEMI (non-ST elevated myocardial infarction) (H)   ? Tachycardia   ? Anxiety   ? Spinal stenosis   ? Hypertension   ? Benign Prostatic Hypertrophy   ? Myalgia And Myositis   ? Urinary Tract Infection   ? Feelings Of Urinary Urgency   ? Joint Pain, Localized In The Hip     Past Medical History:   Diagnosis Date   ? Arthritis    ? Benign prostatic hyperplasia with lower urinary tract symptoms 2/23/2017   ? BPH (benign prostatic hypertrophy)    ? Coronary artery disease    ? History of transfusion 1975   ? Hypertension    ? Pneumonia 1980   ? Post-void dribbling 2/23/2017   ? Retinal tear of right eye    ? Rhinitis medicamentosa 2/23/2017   ? Spinal stenosis    ? Spinal stenosis    ? UTI (urinary tract infection)        Past Social History     Reviewed, and he  reports that he quit smoking about 34 years ago. He smoked 0.00 packs per day. He has never used smokeless tobacco. He reports that he does not drink alcohol or use drugs.    Family History     Reviewed, and includes cataracts and CVD in his father; his mother had dementia; grandmother had DM    Medication List     Current Outpatient Medications on File Prior to Visit   Medication Sig Dispense Refill   ? acetaminophen (TYLENOL) 325 MG tablet Take 650 mg by mouth 4 (four) times a day.     ? acetaminophen (TYLENOL) 500 MG tablet Take 500-1,000 mg by mouth every 4 (four) hours as needed for pain. Do not exceed  4000 mg in 24 hours     ? cyclobenzaprine (FLEXERIL) 10 MG tablet Take 1 tablet (10 mg total) by mouth 2 (two) times a day as needed for muscle spasms. 10 tablet 0   ? diclofenac sodium (VOLTAREN) 1 % Gel Apply 1 g topically 3 (three) times a day. Apply to bilateral knees/hips     ? diclofenac sodium (VOLTAREN) 1 % Gel Apply 1 g topically as needed. To hips/knees/ as needed     ? doxycycline (VIBRAMYCIN) 100 MG capsule Take 1 capsule (100 mg total) by mouth 2 (two) times a day for 7 days. 14 capsule 0   ? fluticasone propionate (FLONASE) 50 mcg/actuation nasal spray Apply 1 spray into each nostril every 8 (eight) hours as needed. For overuse of nasal decongestant.            ? furosemide (LASIX) 20 MG tablet Take 20 mg by mouth daily.     ? gabapentin (NEURONTIN) 100 MG capsule Take 100 mg by mouth 2 (two) times a day.     ? magnesium oxide (MAG-OX) 400 mg (241.3 mg magnesium) tablet Take 400 mg by mouth daily.     ? naproxen sodium (ALEVE) 220 MG tablet Take 220 mg by mouth 2 (two) times a day with meals.            ? phenylephrine (NICOLE-SYNEPHRINE) 1 % Spry 1-2 sprays into each nostril every 4 (four) hours as needed.            ? polyethylene glycol (MIRALAX) 17 gram packet Take 1 packet (17 g total) by mouth daily.  0   ? potassium chloride (K-DUR,KLOR-CON) 20 MEQ tablet Take 20 mEq by mouth daily.     ? sodium chloride (OCEAN) 0.65 % nasal spray 1 spray into each nostril as needed for congestion. Use with phenylephrine spray.     ? tamsulosin (FLOMAX) 0.4 mg cap Take 0.4 mg by mouth Daily after breakfast.            ? tiZANidine (ZANAFLEX) 4 MG tablet Take 4 mg by mouth twice a day with lunch and supper.       No current facility-administered medications on file prior to visit.        Allergies     Allergies   Allergen Reactions   ? Lisinopril Shortness Of Breath and Dizziness   ? Fosinopril Sodium Other (See Comments) and Dizziness     Mouth numbness   ? Amoxicillin-Pot Clavulanate Hives and Rash   ?  Cephalosporins Hives and Rash   ? Penicillins Rash       Review of Systems   A comprehensive review of 14 systems was done. Pertinent findings noted here and in history of present illness. All the rest negative.  Constitutional: Negative.  Negative for fever, chills, activity change, appetite change and fatigue  He is having significant weight gain of almost 40 pounds since admission.   HENT: Negative for congestion and facial swelling.    Eyes: Negative for photophobia, redness and visual disturbance.   Respiratory: Negative for cough and chest tightness.    Cardiovascular: Negative for chest pain, palpitations and leg swelling.   Gastrointestinal: Negative for nausea, diarrhea, constipation, blood in stool and abdominal distention.   Genitourinary: Negative.    Musculoskeletal: Negative.    Skin: Negative.  Hyperpigmented rescaling noted in both of his hands.  Patient told me it is from chronic handwashing  Neurological: Negative for dizziness, tremors, syncope, weakness, light-headedness and headaches.   Hematological: Does not bruise/bleed easily.   Psychiatric/Behavioral: Negative.  Patient is reporting insomnia.  Mood and behaviors have shown some dysregulation with outbursts reported by staff      Physical Exam     Recent Vitals 11/4/2019   Weight 219 lbs 3 oz   BSA (m2) 2.2 m2   /81   Pulse 94   Temp 98.1   Temp src -   SpO2 -   Some recent data might be hidden   Recheck weight now is 221 pounds    Constitutional: Oriented to person, place, and time and appears well-developed.   Looks very disabled and deconditioned  HEENT:  Normocephalic and atraumatic.  Eyes: Conjunctivae and EOM are normal. Pupils are equal, round, and reactive to light. No discharge.  No scleral icterus. Nose normal. Mouth/Throat: Oropharynx is clear and moist. No oropharyngeal exudate.    NECK: Normal range of motion. Neck supple. No JVD present. No tracheal deviation present. No thyromegaly present.   CARDIOVASCULAR: Normal rate,  regular rhythm and intact distal pulses.  Exam reveals no gallop and no friction rub.  Systolic murmur present.  PULMONARY: Effort normal and breath sounds normal. No respiratory distress.No Wheezing or rales.  ABDOMEN: Soft. Bowel sounds are normal. No distension and no mass.  There is no tenderness. There is no rebound and no guarding. No HSM.  Open area noted on his right buttock which appears to be chronic with hyperpigmented changes noted with irritation on his buttocks  MUSCULOSKELETAL: Normal range of motion. 1+ edema and no tenderness. Mild kyphosis, no tenderness.  Right lower extremity is swollen with profound arthritic changes noted with swelling in his right knee.  On standing leg is crooked and patient is not able to stand up straight.  Profound kyphoscoliosis of his back also noted and he could not straighten himself up he was in severe pain  Left upper extremity is painful with limited range of movement and has 2+ pitting edema  LYMPH NODES: Has no cervical, supraclavicular, axillary and groin adenopathy.   NEUROLOGICAL: Alert and oriented to person, place, and time. No cranial nerve deficit.  Normal muscle tone. Coordination normal.   GENITOURINARY: Deferred exam.  Valentine present  SKIN: Skin is warm and dry. No rash noted. No erythema. No pallor.   Hypopigmentary changes noted in his both fingers and hands which patient attributes to chronic handwashing  EXTREMITIES: No cyanosis, no clubbing,1+ edema. No Deformity.  PSYCHIATRIC: Normal mood, affect and behavior.  Has chronic anxiety      Lab Results       Lab Results   Component Value Date    ALBUMIN 4.0 02/15/2019    ALT 19 02/15/2019    AST 19 02/15/2019    BUN 39 (H) 11/05/2019    CALCIUM 8.9 11/05/2019     11/05/2019    CHOL 132 10/19/2014    CO2 25 11/05/2019    CREATININE 0.76 11/05/2019    GFRAA >60 11/05/2019    GFRNONAA >60 11/05/2019    GLU 90 11/05/2019    HCT 30.9 (L) 11/01/2019    WBC 8.7 11/01/2019    HGB 9.8 (L) 11/01/2019    MG  1.8 09/08/2019     11/01/2019    K 4.9 11/05/2019    PSA 4.5 02/15/2013     11/05/2019         Post Discharge Medication Reconciliation Status: discharge medications reconciled and changed, per note/orders (see AVS)      NICKOLAS Alvarado

## 2021-06-19 NOTE — LETTER
Letter by Juana Le MBBS at      Author: Juana Le MBBS Service: -- Author Type: --    Filed:  Encounter Date: 9/12/2019 Status: (Other)         Patient: Rashad Caputo   MR Number: [id number]   YOB: 1950   Date of Visit: 9/12/2019       [facility] Admission note      Patient: Rashad Caputo  MRN: [id number]  Date of Service: 9/12/2019      [facility] [[id number]]  Reason for Visit     Chief Complaint   Patient presents with   ? 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  -Vulnerable adult  -Failure to thrive with difficulty functioning in his home environment.  Multiple ER visits noted with 6 emergency room visits noted     Plan     Patient has been admitted to the TCU.  Pain management reviewed with him.  He is doing a little better with improvement in his pain and muscle aches reported with scheduled Tylenol and NSAIDs along with gabapentin and the addition of Zanaflex for muscle relaxers.  He is reporting some sedation concerns with these medications.  At present my plan is  to continue with his meds for the weekend and reassess him next week if he has improvement will increase the dosage or continue the same regimen but otherwise discontinued if he has persistent sedation or confusion concerns.  Again encouraged him to see orthopedics for consideration of evaluation and possible surgical intervention for his knee as well as back.    He remains resistant to the idea.  UA appears to be dirty he probably has a UTI and will need treatment but again does not want a catheter exchange done will await urology's recommendation for him.  Advised frequent repositioning due to underlying history of sacral pressure sore.  Continue with his rehab   he is looking for placement    History     Patient is a very pleasant 69 y.o. male who is admitted to TCU  Patient presented to the hospital with acute urinary retention.  He had a Valentine catheter placed and currently discharged with an indwelling Valentine catheter.  He will follow with urology for a voiding trial.  Due to hematuria concerns with patient reporting some dysuria UA UC has been drawn.  It appears to be contaminated but with moderate bacteria cultures pending.  He is concerned that he has a UTI.  Unfortunately he does not want a catheter exchange done.  He was reporting significant myalgias and a CK was done which is normal.  At present he was given gabapentin as well as scheduled Zanaflex for pain management he reports that he is also on Tylenol and NSAIDs and has had some improvement he was able to handle his pain better last night.  Again he is high risk for opioid dependence and we are trying to avoid narcotics.  He is in agreement with that plan again had a discussion with him about following up with orthopedics to discuss options for his advanced osteoarthritis of his knee with profound deformity and severe kyphoscoliosis of his back again associated profound deformity and limited mobility.  Constipation issues remain at baseline he is  refusing stool softeners    Past Medical History     Active Ambulatory (Non-Hospital) Problems    Diagnosis   ? Urinary retention   ? Urinary obstruction   ? Urinary tract infection   ? Benign prostatic hyperplasia with lower urinary tract symptoms   ? Post-void dribbling   ? Rhinitis medicamentosa   ? Sepsis due to urinary tract infection (H)   ? Sepsis (H)   ? CAP (community acquired pneumonia)   ? CAD (coronary artery disease)   ? Non-STEMI (non-ST elevated myocardial infarction) (H)   ? Tachycardia   ? Anxiety   ? Spinal stenosis   ? Hypertension   ? Benign Prostatic Hypertrophy   ? Myalgia And Myositis   ? Urinary Tract Infection   ? Feelings Of Urinary Urgency   ? Joint Pain, Localized In The Hip     Past Medical History:   Diagnosis Date   ? Arthritis    ? Benign prostatic hyperplasia with lower urinary tract symptoms 2/23/2017   ? BPH (benign prostatic hypertrophy)    ? Coronary artery disease    ? History of transfusion 1975   ? Hypertension    ? Pneumonia 1980   ? Post-void dribbling 2/23/2017   ? Retinal tear of right eye    ? Rhinitis medicamentosa 2/23/2017   ? Spinal stenosis    ? UTI (urinary tract infection)        Past Social History     Reviewed, and he  reports that he quit smoking about 34 years ago. He has never used smokeless tobacco. He reports that he does not drink alcohol or use drugs.    Family History     Reviewed, and includes cataracts and CVD in his father; his mother had dementia; grandmother had DM    Medication List     Current Outpatient Medications on File Prior to Visit   Medication Sig Dispense Refill   ? acetaminophen (TYLENOL) 500 MG tablet Take 1-2 tablets (500-1,000 mg total) by mouth every 4 (four) hours as needed.  0   ? cyclobenzaprine (FLEXERIL) 10 MG tablet Take 1 tablet (10 mg total) by mouth 2 (two) times a day as needed for muscle spasms. 10 tablet 0   ? fluticasone propionate (FLONASE) 50 mcg/actuation nasal spray Apply 1 spray into each nostril 3 (three) times a  day as needed. For overuse of nasal decongestant.     ? magnesium oxide (MAG-OX) 400 mg (241.3 mg magnesium) tablet Take 400 mg by mouth daily.     ? naproxen sodium (ALEVE) 220 MG tablet Take 220 mg by mouth at bedtime.     ? phenylephrine (NICOLE-SYNEPHRINE) 1 % Spry 1-2 sprays into each nostril every 4 (four) hours as needed.            ? polyethylene glycol (MIRALAX) 17 gram packet Take 1 packet (17 g total) by mouth daily.  0   ? sodium chloride (OCEAN) 0.65 % nasal spray 1 spray into each nostril as needed for congestion. Use with phenylephrine spray.       No current facility-administered medications on file prior to visit.        Allergies     Allergies   Allergen Reactions   ? Lisinopril Shortness Of Breath and Dizziness   ? Fosinopril Sodium Other (See Comments) and Dizziness     Mouth numbness   ? Amoxicillin-Pot Clavulanate Hives and Rash   ? Cephalosporins Hives and Rash   ? Penicillins Rash       Review of Systems   A comprehensive review of 14 systems was done. Pertinent findings noted here and in history of present illness. All the rest negative.  Constitutional: Negative.  Negative for fever, chills, activity change, appetite change and fatigue.   HENT: Negative for congestion and facial swelling.    Eyes: Negative for photophobia, redness and visual disturbance.   Respiratory: Negative for cough and chest tightness.    Cardiovascular: Negative for chest pain, palpitations and leg swelling.   Gastrointestinal: Negative for nausea, diarrhea, constipation, blood in stool and abdominal distention.   Genitourinary: Negative.    Musculoskeletal: Negative.    Skin: Negative.  Hyperpigmented rescaling noted in both of his hands.  Patient told me it is from chronic handwashing  Neurological: Negative for dizziness, tremors, syncope, weakness, light-headedness and headaches.   Hematological: Does not bruise/bleed easily.   Psychiatric/Behavioral: Negative.        Physical Exam     Recent Vitals 9/8/2019    Height -   Weight -   BSA (m2) -   BP -   Pulse -   Temp 98.6   Temp src -   SpO2 -   Some recent data might be hidden     Weight is 171 pounds.  Blood pressure 131/88 temp 98 pulse 82  Constitutional: Oriented to person, place, and time and appears well-developed.   Looks very disabled and deconditioned  HEENT:  Normocephalic and atraumatic.  Eyes: Conjunctivae and EOM are normal. Pupils are equal, round, and reactive to light. No discharge.  No scleral icterus. Nose normal. Mouth/Throat: Oropharynx is clear and moist. No oropharyngeal exudate.    NECK: Normal range of motion. Neck supple. No JVD present. No tracheal deviation present. No thyromegaly present.   CARDIOVASCULAR: Normal rate, regular rhythm and intact distal pulses.  Exam reveals no gallop and no friction rub.  Systolic murmur present.  PULMONARY: Effort normal and breath sounds normal. No respiratory distress.No Wheezing or rales.  ABDOMEN: Soft. Bowel sounds are normal. No distension and no mass.  There is no tenderness. There is no rebound and no guarding. No HSM.  Open area noted on his right buttock which appears to be chronic with hyperpigmented changes noted with irritation on his buttocks  MUSCULOSKELETAL: Normal range of motion. No edema and no tenderness. Mild kyphosis, no tenderness.  Right lower extremity is swollen with profound arthritic changes noted with swelling in his right knee.  On standing leg is crooked and patient is not able to stand up straight.  Profound kyphoscoliosis of his back also noted and he could not straighten himself up he was in severe pain  LYMPH NODES: Has no cervical, supraclavicular, axillary and groin adenopathy.   NEUROLOGICAL: Alert and oriented to person, place, and time. No cranial nerve deficit.  Normal muscle tone. Coordination normal.   GENITOURINARY: Deferred exam.  Valentine present  SKIN: Skin is warm and dry. No rash noted. No erythema. No pallor.   Hypopigmentary changes noted in his both fingers and  hands which patient attributes to chronic handwashing  EXTREMITIES: No cyanosis, no clubbing, no edema. No Deformity.  PSYCHIATRIC: Normal mood, affect and behavior.  Has chronic anxiety      Lab Results       Lab Results   Component Value Date    ALBUMIN 4.0 02/15/2019    ALT 19 02/15/2019    AST 19 02/15/2019    BUN 22 09/08/2019    CALCIUM 9.3 09/08/2019     09/08/2019    CHOL 132 10/19/2014    CO2 25 09/08/2019    CREATININE 0.70 09/11/2019    GFRAA >60 09/11/2019    GFRNONAA >60 09/11/2019     09/08/2019    HCT 35.2 (L) 09/08/2019    WBC 7.3 09/08/2019    HGB 11.4 (L) 09/08/2019    MG 1.8 09/08/2019     09/08/2019    K 4.6 09/08/2019    PSA 4.5 02/15/2013     09/08/2019             Post Discharge Medication Reconciliation Status: discharge medications reconciled and changed, per note/orders (see AVS)      NICKOLAS Alvarado

## 2021-06-19 NOTE — LETTER
Letter by Anuradha Barrett CNP at      Author: Anuradha Barrett CNP Service: -- Author Type: --    Filed:  Encounter Date: 2019 Status: Signed         Patient: Rashad Caputo   MR Number: [id number]   YOB: 1950   Date of Visit: 2019       [facility]      NAME:  Rashad Caputo             :  1950    MRN: [id number]    CODE STATUS:  FULL CODE    FACILITY: [facility] [[id number]]         CHIEF COMPLAIN/REASON FOR VISIT:  Chief Complaint   Patient presents with   ? Review Of Multiple Medical Conditions       HISTORY OF PRESENT ILLNESS: Rashad Caputo is a 69 y.o. male being seen for review of multiple medical conditions, asked per staff to see pt for increased arm pain. Left arm has increased edema. He needs an MRI but has not scheduled yet per his choice. Past work up negative for DVT. .  Per PMH: Patient presented to the hospital with acute urinary retention.  He had a Shen catheter placed and currently discharged with an indwelling Shen catheter.  He will follow with urology for a voiding trial. He was noted to have hematuria on , improved today. He developed a UTI cultures grew strep viridans.  He was given Levaquin 7 days post discharge.He has bowel issues of fecal incontinence, ongoing. Reports some pain to left knee, managed with analgesics.   He was to go to  for Folay removal today, but once again cancelled his apt. He was educated ( again) on risk benefits of prolonged catheter use. He reports to me that he promises to have it removed by next Tuesday.   He does has shen, is to see  on 19/ Lwft arm with increased edema, reports pain. Refuses offer of voltaren gel. Wt has been stable sibce 19. Appears edematous today, from upper arm to hand. New open area to his left arm, probable fluid blister opened. It id dry, suggest leave OA.    Allergies   Allergen Reactions   ? Lisinopril Shortness Of Breath and Dizziness    ? Fosinopril Sodium Other (See Comments) and Dizziness     Mouth numbness   ? Tramadol Other (See Comments)     sweating   ? Amoxicillin-Pot Clavulanate Hives and Rash   ? Cephalosporins Hives and Rash   ? Penicillins Rash   :     Current Outpatient Medications   Medication Sig   ? acetaminophen (TYLENOL) 325 MG tablet Take 650 mg by mouth 4 (four) times a day.   ? acetaminophen (TYLENOL) 500 MG tablet Take 500-1,000 mg by mouth every 4 (four) hours as needed for pain. Do not exceed 4000 mg in 24 hours   ? cyclobenzaprine (FLEXERIL) 10 MG tablet Take 1 tablet (10 mg total) by mouth 2 (two) times a day as needed for muscle spasms.   ? diclofenac sodium (VOLTAREN) 1 % Gel Apply 1 g topically 3 (three) times a day. Apply to bilateral knees/hips   ? diclofenac sodium (VOLTAREN) 1 % Gel Apply 1 g topically as needed. To hips/knees/ as needed   ? fluticasone propionate (FLONASE) 50 mcg/actuation nasal spray Apply 1 spray into each nostril every 8 (eight) hours as needed. For overuse of nasal decongestant.          ? furosemide (LASIX) 20 MG tablet Take 40 mg by mouth daily.          ? gabapentin (NEURONTIN) 100 MG capsule Take 200 mg by mouth 3 (three) times a day.   ? magnesium oxide (MAG-OX) 400 mg (241.3 mg magnesium) tablet Take 400 mg by mouth daily.   ? naproxen sodium (ALEVE) 220 MG tablet Take 220 mg by mouth 2 (two) times a day with meals.          ? phenylephrine (NICOLE-SYNEPHRINE) 1 % Spry 1-2 sprays into each nostril every 4 (four) hours as needed.          ? polyethylene glycol (MIRALAX) 17 gram packet Take 1 packet (17 g total) by mouth daily.   ? potassium chloride (K-DUR,KLOR-CON) 10 MEQ tablet Take 10 mEq by mouth daily.   ? QUEtiapine (SEROQUEL) 50 MG tablet Take 50 mg by mouth at bedtime.   ? sodium chloride (OCEAN) 0.65 % nasal spray 1 spray into each nostril as needed for congestion. Use with phenylephrine spray.   ? tamsulosin (FLOMAX) 0.4 mg cap Take 0.4 mg by mouth Daily after breakfast.                 REVIEW OF SYSTEMS:    Currently, no fever, chills, or rigors. Does not have any visual or hearing problems. Denies any chest pain, headaches, palpitations, lightheadedness, dizziness, shortness of breath, or cough. Appetite is good. Denies any GERD symptoms. Denies any difficulty with swallowing, nausea, or vomiting.  Denies any abdominal pain, was constipated now with loose stools d/t prune juice. Denies any urinary symptoms other then retention,. No insomnia. No active bleeding other then ongoing hematuria. No rash. Pain to left arm      PHYSICAL EXAMINATION:  Vitals:    11/27/19 0527   BP: 122/78   Pulse: 88   Temp: 97.8  F (36.6  C)   Weight: (!) 233 lb (105.7 kg)         GENERAL: Awake, Alert, oriented x3,unkempt in appearance,  not in any form of acute distress, answers questions appropriately, follows simple commands, conversant  HEENT: Head is normocephalic with normal hair distribution. No evidence of trauma. Ears: No acute purulent discharge. Eyes: Conjunctivae pink with no scleral jaundice. Nose: Normal mucosa and septum. NECK: Supple with no cervical or supraclavicular lymphadenopathy. Trachea is midline.   CHEST: No tenderness or deformity, no crepitus  LUNG: Clear to auscultation with good chest expansion. There are no crackles or wheezes, normal AP diameter.  BACK: No kyphosis of the thoracic spine. Symmetric, no curvature, ROM normal, no CVA tenderness, no spinal tenderness   CVS: There is good S1  S2,  rhythm is regular.  ABDOMEN: Globular and soft, nontender to palpation, non distended, no masses, no organomegaly, good bowel sounds, no rebound or guarding, no peritoneal signs.   EXTREMITIES: ROM UE WNL, left arm with increased pain and Swelling  SKIN: Warm and dry, no erythema noted, no rashes or lesions.  NEUROLOGICAL: The patient is oriented to person, place and time. Strength and sensation are grossly intact. Face is symmetric.          LABS:    Lab Results   Component Value Date    WBC  8.7 11/01/2019    HGB 9.8 (L) 11/01/2019    HCT 30.9 (L) 11/01/2019    MCV 83 11/01/2019     11/01/2019       Results for orders placed or performed in visit on 11/21/19   Basic Metabolic Panel   Result Value Ref Range    Sodium 138 136 - 145 mmol/L    Potassium 5.3 (H) 3.5 - 5.0 mmol/L    Chloride 105 98 - 107 mmol/L    CO2 24 22 - 31 mmol/L    Anion Gap, Calculation 9 5 - 18 mmol/L    Glucose 91 70 - 125 mg/dL    Calcium 9.3 8.5 - 10.5 mg/dL    BUN 43 (H) 8 - 22 mg/dL    Creatinine 0.95 0.70 - 1.30 mg/dL    GFR MDRD Af Amer >60 >60 mL/min/1.73m2    GFR MDRD Non Af Amer >60 >60 mL/min/1.73m2           No results found for: HGBA1C  No results found for: FEGDLTYM92TE  No results found for: VPBHRJLY51    ASSESSMENT/PLAN:  1. Urinary retention    2. Debilitated patient      1. Urinary retention: Rashad has had catheter in place since admission and often cancels his apt with . Will not allow nursing to try voiding trial. He reports to me he has f/u  apt 11/29/19    2. Debilitated pt: PT will be seeing pt x 3 weekly. He has had chronic medical conditions with ortho issues including chronic pain, left shoulder fx , back and knee abnormality.He is followed by ortho with no concrete plans for surgery.  He has ongoing servicse with physiatric, Dr Cisneros here weekly.  Spent time with Dr Cisneros andmyself this am and Rashad has agreed to try seroquel, but recently refused. Again agreed with Dr Cisneros to try for at least one week.        Electronically signed by:  Anuradha Barrett CNP  This progress note was completed using Dragon software and there may be grammatical errors.

## 2021-06-19 NOTE — LETTER
Letter by Anuradha Barrett CNP at      Author: Anuradha Barrett CNP Service: -- Author Type: --    Filed:  Encounter Date: 2019 Status: Signed         Patient: Rashad Caputo   MR Number: [id number]   YOB: 1950   Date of Visit: 2019       [facility]      NAME:  Rashad Caputo             :  1950    MRN: [id number]    CODE STATUS:  FULL CODE    FACILITY: [facility] [[id number]]         CHIEF COMPLAIN/REASON FOR VISIT:  Chief Complaint   Patient presents with   ? Review Of Multiple Medical Conditions       HISTORY OF PRESENT ILLNESS: Rashad Caputo is a 69 y.o. male being seen for review of multiple medical conditions.. Patient presented to the hospital with acute urinary retention.  He had a Valentine catheter placed and currently discharged with an indwelling Valentine catheter.  He will follow with urology for a voiding trial. He was noted to have hematuria on , improved today. He developed a UTI cultures grew strep viridans.  He was given Levaquin 7 days post discharge.He has bowel issues of fecal incontinence D/T this, he cancelled his  apt. He remains in a weakened deconditioned state, working towards dc soon, disposition still undetermined.    Allergies   Allergen Reactions   ? Lisinopril Shortness Of Breath and Dizziness   ? Fosinopril Sodium Other (See Comments) and Dizziness     Mouth numbness   ? Amoxicillin-Pot Clavulanate Hives and Rash   ? Cephalosporins Hives and Rash   ? Penicillins Rash   :     Current Outpatient Medications   Medication Sig   ? acetaminophen (TYLENOL) 500 MG tablet Take 1-2 tablets (500-1,000 mg total) by mouth every 4 (four) hours as needed.   ? cyclobenzaprine (FLEXERIL) 10 MG tablet Take 1 tablet (10 mg total) by mouth 2 (two) times a day as needed for muscle spasms.   ? fluticasone propionate (FLONASE) 50 mcg/actuation nasal spray Apply 1 spray into each nostril 3 (three) times a day as needed. For  overuse of nasal decongestant.   ? magnesium oxide (MAG-OX) 400 mg (241.3 mg magnesium) tablet Take 400 mg by mouth daily.   ? melatonin 3 mg Tab tablet Take 5 mg by mouth at bedtime as needed.   ? naproxen sodium (ALEVE) 220 MG tablet Take 220 mg by mouth at bedtime.   ? phenylephrine (NICOLE-SYNEPHRINE) 1 % Spry 1-2 sprays into each nostril every 4 (four) hours as needed.          ? polyethylene glycol (MIRALAX) 17 gram packet Take 1 packet (17 g total) by mouth daily.   ? sodium chloride (OCEAN) 0.65 % nasal spray 1 spray into each nostril as needed for congestion. Use with phenylephrine spray.         REVIEW OF SYSTEMS:    Currently, no fever, chills, or rigors. Does not have any visual or hearing problems. Denies any chest pain, headaches, palpitations, lightheadedness, dizziness, shortness of breath, or cough. Appetite is good. Denies any GERD symptoms. Denies any difficulty with swallowing, nausea, or vomiting.  Denies any abdominal pain, was constipated now with loose stools d/t prune juice. Denies any urinary symptoms other then retention,. No insomnia. No active bleeding. No rash.       PHYSICAL EXAMINATION:  Vitals:    09/25/19 1007   BP: 131/80   Pulse: 82   Temp: 98.2  F (36.8  C)   Weight: 176 lb (79.8 kg)         GENERAL: Awake, Alert, oriented x3,unkempt in appearance,  not in any form of acute distress, answers questions appropriately, follows simple commands, conversant  HEENT: Head is normocephalic with normal hair distribution. No evidence of trauma. Ears: No acute purulent discharge. Eyes: Conjunctivae pink with no scleral jaundice. Nose: Normal mucosa and septum. NECK: Supple with no cervical or supraclavicular lymphadenopathy. Trachea is midline.   CHEST: No tenderness or deformity, no crepitus  LUNG: Clear to auscultation with good chest expansion. There are no crackles or wheezes, normal AP diameter.  BACK: No kyphosis of the thoracic spine. Symmetric, no curvature, ROM normal, no CVA  tenderness, no spinal tenderness   CVS: There is good S1  S2,  rhythm is regular.  ABDOMEN: Globular and soft, nontender to palpation, non distended, no masses, no organomegaly, good bowel sounds, no rebound or guarding, no peritoneal signs.   EXTREMITIES: ROM UE WNL, he is unable to bear wt on legs. In wc. Pedal pulses present, no edema, arthritic aged joint swelling, right knee very edematous. .  SKIN: Warm and dry, no erythema noted, no rashes or lesions.  NEUROLOGICAL: The patient is oriented to person, place and time. Strength and sensation are grossly intact. Face is symmetric.                  LABS:    Lab Results   Component Value Date    WBC 7.3 09/08/2019    HGB 11.4 (L) 09/08/2019    HCT 35.2 (L) 09/08/2019    MCV 82 09/08/2019     09/08/2019       Results for orders placed or performed during the hospital encounter of 09/08/19   Basic Metabolic Panel   Result Value Ref Range    Sodium 136 136 - 145 mmol/L    Potassium 4.6 3.5 - 5.0 mmol/L    Chloride 104 98 - 107 mmol/L    CO2 25 22 - 31 mmol/L    Anion Gap, Calculation 7 5 - 18 mmol/L    Glucose 101 70 - 125 mg/dL    Calcium 9.3 8.5 - 10.5 mg/dL    BUN 22 8 - 22 mg/dL    Creatinine 0.65 (L) 0.70 - 1.30 mg/dL    GFR MDRD Af Amer >60 >60 mL/min/1.73m2    GFR MDRD Non Af Amer >60 >60 mL/min/1.73m2           No results found for: HGBA1C  No results found for: VQVFAPZH20VO  No results found for: JKBLZYPZ89    ASSESSMENT/PLAN:  1. Anxiety    2. Urinary retention    3. Fecal smearing      1. Anxiety: Rashad needs much encouragement and reassurance due to anxiety. He has Gabapentin scheduled for pain and this helps with anxiety as well. ACP services ordered.    2. Retention and. Urinary obstruction/ sepsis : Valentine in place, hematuria has disapated Encouraged fluid .H e has  F/U was cancelled. He is now completed ABX regime. I am encouraging him to keep his  apt that has been rescheduled, reluctant due to fecal incontinence.Appointment on 9/27/19 at  8:15am, [facility]     3. Fecal smearing/Incontience: Spoke to Rashad r/khurram bowels. He is on scheduled program.  I requested to perform rectal check to eval for a bowel vault blockage , he refused. We will dc his miralax as he is refusing.    Electronically signed by:  Anuradha Barrett CNP  This progress note was completed using Dragon software and there may be grammatical errors.

## 2021-06-19 NOTE — LETTER
Letter by Juana Le MBBS at      Author: Juana Le MBBS Service: -- Author Type: --    Filed:  Encounter Date: 11/21/2019 Status: Signed         Patient: Rashda Caputo   MR Number: [id number]   YOB: 1950   Date of Visit: 11/21/2019       [facility] Admission note      Patient: Rashad Caputo  MRN: [id number]  Date of Service: 11/21/2019      [facility] [[id number]]  Reason for Visit     Chief Complaint   Patient presents with   ? Review Of Multiple Medical Conditions       Code Status     Full code    Assessment     -Lymphedema left upper extremity now with dusky pigmentation and open wound close to his elbow secondary to pulling of fluids  -Acute onset of left shoulder dislocation/anterior subluxation with no fractures been identified appears to be a long-standing condition  -Acute pain secondary to osteoarthritis not improving in spite of multiple pain pills  -Acute back pain with underlying history of severe spinal stenoses/kyphoscoliosis with postural instability noted on exam  -Acute psychosocial stressors causing significant anxiety patient  -Chronic stage II pressure sores on buttocks  -Severe osteoarthritis bone-on-bone end-stage of the right knee associated with deformity and contractures  -Profound limitation in mobility with patient essentially bedbound requiring 2 person assist for even postural changes having extreme difficulty standing  -Suspected underlying personality and cognitive changes.  -Vulnerable adult  -Failure to thrive with difficulty functioning /progressive decline noted since patient has been admitted to TCU requiring more assistance with cares  -Hyperkalemia noted on recheck BMP    Plan     Patient has been admitted to the TCU.  Patient was examined for left upper extremity swelling.  Care plan was reviewed with him  Subsequently he was reexamined the presence and request of the nursing manager and  to discuss goals of  care.  He understands that he has significant lymphedema left upper extremity due to vascular compromise of his left upper extremity he has lymphedema sleeve but now he has severe lymphedema of his left upper arm   this is not resolving with just elevation and using a lymphedema sleeve  He is now getting blisters which are bursting open now he has open wounds in his left arm which puts him at risk of cellulitis and vascular compromise  He was seen by orthopedics and recommended to go for MRI in preparation for surgery  Appointment was made and he refused to go  He had another appointment with urology that he refused to go to  He has severe osteoarthritis with deformity and arthritis and inability to ambulate noted in his right knee for which she is refusing to follow-up with orthopedics 2.  He has severe dislocation of his right shoulder joint which is also chronic pain management has been ineffective  He does not want to try narcotics; tramadol has caused him reaction so he is refusing to use it  He reports ineffective pain relief with naproxen and Tylenol and nursing reports that he frequently will refuse to use it yet he reports his pain is quite high as 8 out of 10 at best even with all pain pills on board.  Various other options including getting intralesional steroid injection and considering surgical option have been refused but the patient I did discuss with him and discuss his options  My myself as well as the nursing manager and the  reviewed his care plan and give him the option of giving up on his cares and excepting bad outcomes and going on palliative approach.  He refuses that he thinks he is too young and needs to be full code  He has been given the option of either going to the hospital and getting emergent care for his bilateral shoulder dislocations with left upper extremity swelling versus urgently having an MRI done and following up with Ortho without any cancellation  appointments  He has chosen the second option.  He will however not lower MRI even if I offer him to give him an Ativan prior to the procedure.  Open MRI with CDI has been ordered for him for both his shoulders  Patient is aware of adverse outcomes if he leaves his shoulder dislocation untreated in his left upper extremity  Discharge planning also reviewed with him he still debating where he wants to go  Hyperkalemia concerns were reviewed with him and we will DC his potassium supplement and recheck potassium again  Overall I have explained to him that instead of improving in the TCU he is declining  We are not able to help him and we are strongly urging him either to follow as an outpatient or consider going back to the hospital to get the help that he needs he does not want to go back to the hospital either.  Total time spent is 35 minutes more than 30 minutes spent face-to-face talking to this patient reviewing care plan including goals of care which patient chooses to be full code and also need for urgent evaluation and starting a treatment plan which include surgical intervention for his severe osteoarthritis and dislocation of his shoulders especially his left upper extremity to minimize risk of vascular compromise/bad outcomes  Patient is aware that if he cancels another appointment and decides not to pursue MRI or follow-up with orthopedics he will have no option but to go back to the hospital  Also cautioned him about progressive weight loss due to excessive caloric intake    History     Patient is a very pleasant 69 y.o. male who is admitted to TCU  Patient had acute onset of left upper extremity pain along with swelling ongoing for a couple of days.  Unfortunately work-up in the TCU was nondiagnostic.  He did not have any DVT edema was pitting and nonresponsive to diuretics he was  Sent to the emergency room for further evaluation and a repeat upper extremity ultrasound did not show any DVT but a  questionable left axillary mass.  Subsequently a CT showed an anterior subluxation of the left shoulder.  Orthopedics was consulted.  They have recommended outpatient follow-up with MRI of his left shoulder  He has bilateral anterior subluxation of both shoulders noted today left upper extremity remains painful  Unfortunately no resolution he has refused to go for MRI appointment  He has refused to follow-up with orthopedics his arm swelling is getting worse with no wounds been noted in spite of the lymphedema sleeve he is in constant pain because of this reason    He also has an chronic indwelling Valentine catheter.  He was recently seen in the emergency room because of blood in his catheter he was diagnosed with hemorrhagic cystitis and given Levaquin.  He had a follow-up with urology which he chose not to attend.  He told me he did not go today because of pain issues urologist repeatedly requested a voiding trial but he is refusing    He has had progressive weight gain of more than 40 pounds with lymphedema he admits he has significant caloric restriction prior to coming to the TCU and has been overeating with excessive caloric intake.  Staff is reporting the family has been bringing him extra food    He has been started on Lasix due to progressive weight gain recheck labs do show hyperkalemia potassium of 5.3    Mood and behaviors were reviewed and he has significant issues with that he has been using abusive language towards staff he is getting more paranoid in the evening getting somewhat in verbal altercations with staff members.  He feels his care is being ignored because of this reason he is seeing psychology in the past he is reperfused to take any psychotropic medications to manage his mood behaviors and anxiety.    Past Medical History     Active Ambulatory (Non-Hospital) Problems    Diagnosis   ? Pain   ? Left arm swelling   ? Fluid overload   ? Lymphedema   ? Allergic rhinitis   ? H/O noncompliance with  medical treatment, presenting hazards to health   ? Failure to thrive in adult   ? Cognitive impairment   ? Weight gain   ? Stool incontinence   ? Insomnia   ? Discharge planning issues   ? Debilitated patient   ? Urinary retention   ? Urinary obstruction   ? Urinary tract infection   ? Benign prostatic hyperplasia with lower urinary tract symptoms   ? Post-void dribbling   ? Rhinitis medicamentosa   ? Sepsis due to urinary tract infection (H)   ? Sepsis (H)   ? CAD (coronary artery disease)   ? Non-STEMI (non-ST elevated myocardial infarction) (H)   ? Tachycardia   ? Anxiety   ? Spinal stenosis   ? Hypertension   ? Benign Prostatic Hypertrophy   ? Myalgia And Myositis   ? Urinary Tract Infection   ? Feelings Of Urinary Urgency   ? Joint Pain, Localized In The Hip     Past Medical History:   Diagnosis Date   ? Arthritis    ? Benign prostatic hyperplasia with lower urinary tract symptoms 2/23/2017   ? BPH (benign prostatic hypertrophy)    ? Coronary artery disease    ? History of transfusion 1975   ? Hypertension    ? Pneumonia 1980   ? Post-void dribbling 2/23/2017   ? Retinal tear of right eye    ? Rhinitis medicamentosa 2/23/2017   ? Spinal stenosis    ? Spinal stenosis    ? UTI (urinary tract infection)        Past Social History     Reviewed, and he  reports that he quit smoking about 34 years ago. He smoked 0.00 packs per day. He has never used smokeless tobacco. He reports that he does not drink alcohol or use drugs.    Family History     Reviewed, and includes cataracts and CVD in his father; his mother had dementia; grandmother had DM    Medication List     Current Outpatient Medications on File Prior to Visit   Medication Sig Dispense Refill   ? acetaminophen (TYLENOL) 325 MG tablet Take 650 mg by mouth 4 (four) times a day.     ? acetaminophen (TYLENOL) 500 MG tablet Take 500-1,000 mg by mouth every 4 (four) hours as needed for pain. Do not exceed 4000 mg in 24 hours     ? cyclobenzaprine (FLEXERIL) 10 MG  tablet Take 1 tablet (10 mg total) by mouth 2 (two) times a day as needed for muscle spasms. 10 tablet 0   ? diclofenac sodium (VOLTAREN) 1 % Gel Apply 1 g topically 3 (three) times a day. Apply to bilateral knees/hips     ? diclofenac sodium (VOLTAREN) 1 % Gel Apply 1 g topically as needed. To hips/knees/ as needed     ? fluticasone propionate (FLONASE) 50 mcg/actuation nasal spray Apply 1 spray into each nostril every 8 (eight) hours as needed. For overuse of nasal decongestant.            ? furosemide (LASIX) 20 MG tablet Take 40 mg by mouth daily.            ? gabapentin (NEURONTIN) 100 MG capsule Take 100 mg by mouth 2 (two) times a day.     ? magnesium oxide (MAG-OX) 400 mg (241.3 mg magnesium) tablet Take 400 mg by mouth daily.     ? naproxen sodium (ALEVE) 220 MG tablet Take 220 mg by mouth 2 (two) times a day with meals.            ? phenylephrine (NICOLE-SYNEPHRINE) 1 % Spry 1-2 sprays into each nostril every 4 (four) hours as needed.            ? polyethylene glycol (MIRALAX) 17 gram packet Take 1 packet (17 g total) by mouth daily.  0   ? potassium chloride (K-DUR,KLOR-CON) 20 MEQ tablet Take 20 mEq by mouth daily.     ? sodium chloride (OCEAN) 0.65 % nasal spray 1 spray into each nostril as needed for congestion. Use with phenylephrine spray.     ? tamsulosin (FLOMAX) 0.4 mg cap Take 0.4 mg by mouth Daily after breakfast.            ? tiZANidine (ZANAFLEX) 4 MG tablet Take 4 mg by mouth twice a day with lunch and supper.       No current facility-administered medications on file prior to visit.        Allergies     Allergies   Allergen Reactions   ? Lisinopril Shortness Of Breath and Dizziness   ? Fosinopril Sodium Other (See Comments) and Dizziness     Mouth numbness   ? Amoxicillin-Pot Clavulanate Hives and Rash   ? Cephalosporins Hives and Rash   ? Penicillins Rash       Review of Systems   A comprehensive review of 14 systems was done. Pertinent findings noted here and in history of present illness.  All the rest negative.  Constitutional: Negative.  Negative for fever, chills, activity change, appetite change and fatigue  He is having significant weight gain of almost 40 pounds since admission.   HENT: Negative for congestion and facial swelling.    Eyes: Negative for photophobia, redness and visual disturbance.   Respiratory: Negative for cough and chest tightness.    Cardiovascular: Negative for chest pain, palpitations and leg swelling.   Gastrointestinal: Negative for nausea, diarrhea, constipation, blood in stool and abdominal distention.   Genitourinary: Negative.    Musculoskeletal: Negative.  Left arm is very swollen reporting chronic pain and he is significantly noted to be in distress because of this reason  Skin: Negative.    Neurological: Negative for dizziness, tremors, syncope, weakness, light-headedness and headaches.   Hematological: Does not bruise/bleed easily.   Psychiatric/Behavioral: Negative.  Patient is reporting insomnia.  Mood and behaviors have shown some dysregulation with outbursts reported by staff      Physical Exam     Recent Vitals 11/19/2019   Weight 223 lbs 6 oz   BSA (m2) 2.22 m2   /81   Pulse 85   Temp 98.6   Temp src -   SpO2 -   Some recent data might be hidden       Constitutional: Oriented to person, place, and time and appears well-developed.   Looks very disabled and deconditioned; and some degree of moderate distress  HEENT:  Normocephalic and atraumatic.  Eyes: Conjunctivae and EOM are normal. Pupils are equal, round, and reactive to light. No discharge.  No scleral icterus. Nose normal. Mouth/Throat: Oropharynx is clear and moist. No oropharyngeal exudate.    NECK: Normal range of motion. Neck supple. No JVD present. No tracheal deviation present. No thyromegaly present.   CARDIOVASCULAR: Normal rate, regular rhythm and intact distal pulses.  Exam reveals no gallop and no friction rub.  Systolic murmur present.  PULMONARY: Effort normal and breath sounds  normal. No respiratory distress.No Wheezing or rales.  ABDOMEN: Soft. Bowel sounds are normal. No distension and no mass.  There is no tenderness. There is no rebound and no guarding. No HSM.  Open area noted on his right buttock which appears to be chronic with hyperpigmented changes noted with irritation on his buttocks  MUSCULOSKELETAL: Normal range of motion. 1+ edema and no tenderness. Mild kyphosis, no tenderness.  Right lower extremity is swollen with profound arthritic changes noted with now noted to have dusky pigmentation as well as open wound around his elbow from a popped blister  Right shoulder also shows anterior dislocation  swelling in his right knee.  On standing leg is crooked and patient is not able to stand up straight.  Profound kyphoscoliosis of his back also noted and he could not straighten himself up he was in severe pain  Left upper extremity is painful with limited range of movement and has 2+ pitting edema  Anterior subluxation of bilateral shoulder joints noted on exam  LYMPH NODES: Has no cervical, supraclavicular, axillary and groin adenopathy.   NEUROLOGICAL: Alert and oriented to person, place, and time. No cranial nerve deficit.  Normal muscle tone. Coordination normal.   GENITOURINARY: Deferred exam.  Valentine present  SKIN: Skin is warm and dry. No rash noted. No erythema. No pallor.   Hypopigmentary changes noted in his both fingers and hands which patient attributes to chronic handwashing  EXTREMITIES: No cyanosis, no clubbing,1+ edema. No Deformity.  PSYCHIATRIC: Normal mood, affect and behavior.  Has chronic anxiety      Lab Results       Lab Results   Component Value Date    ALBUMIN 4.0 02/15/2019    ALT 19 02/15/2019    AST 19 02/15/2019    BUN 43 (H) 11/21/2019    CALCIUM 9.3 11/21/2019     11/21/2019    CHOL 132 10/19/2014    CO2 24 11/21/2019    CREATININE 0.95 11/21/2019    GFRAA >60 11/21/2019    GFRNONAA >60 11/21/2019    GLU 91 11/21/2019    HCT 30.9 (L) 11/01/2019     WBC 8.7 11/01/2019    HGB 9.8 (L) 11/01/2019    MG 1.8 09/08/2019     11/01/2019    K 5.3 (H) 11/21/2019    PSA 4.5 02/15/2013     11/21/2019         Post Discharge Medication Reconciliation Status: discharge medications reconciled and changed, per note/orders (see AVS)      MICHAEL AlvaradoBS

## 2021-06-19 NOTE — LETTER
Letter by Anuradha Barrett CNP at      Author: Anuradha Barrett CNP Service: -- Author Type: --    Filed:  Encounter Date: 10/8/2019 Status: Signed         Patient: Rashad Caputo   MR Number: [id number]   YOB: 1950   Date of Visit: 10/8/2019       [facility]      NAME:  Rashad Caputo             :  1950    MRN: [id number]    CODE STATUS:  FULL CODE    FACILITY: [facility] [[id number]]         CHIEF COMPLAIN/REASON FOR VISIT:  Chief Complaint   Patient presents with   ? Review Of Multiple Medical Conditions       HISTORY OF PRESENT ILLNESS: Rashad Caputo is a 69 y.o. male being seen for review of multiple medical conditions. Patient presented to the hospital with acute urinary retention.  He had a Shen catheter placed and currently discharged with an indwelling Shen catheter.  He will follow with urology for a voiding trial. He was noted to have hematuria on , improved today. He developed a UTI cultures grew strep viridans.  He was given Levaquin 7 days post discharge.He has bowel issues of fecal incontinence, ongoing. Reports some pain to left knee, managed with analgesics.   He was to go to  for Folay removal today, but once again cancelled his apt. He was educated ( again) on risk benefits of prolonged catheter use. He reports to me that he promises to have it removed by next Tuesday.   He does continue with therapies and SN on TCU for management of chronic disease status as well as assist in ADLS and bladder care.We did have a lengthy discussion to discuss Jarek ongoing cancelling of  apts and need for shen removal.    Allergies   Allergen Reactions   ? Lisinopril Shortness Of Breath and Dizziness   ? Fosinopril Sodium Other (See Comments) and Dizziness     Mouth numbness   ? Amoxicillin-Pot Clavulanate Hives and Rash   ? Cephalosporins Hives and Rash   ? Penicillins Rash   :     Current Outpatient Medications   Medication Sig   ?  acetaminophen (TYLENOL) 500 MG tablet Take 1-2 tablets (500-1,000 mg total) by mouth every 4 (four) hours as needed.   ? cyclobenzaprine (FLEXERIL) 10 MG tablet Take 1 tablet (10 mg total) by mouth 2 (two) times a day as needed for muscle spasms.   ? fluticasone propionate (FLONASE) 50 mcg/actuation nasal spray Apply 1 spray into each nostril 3 (three) times a day as needed. For overuse of nasal decongestant.   ? magnesium oxide (MAG-OX) 400 mg (241.3 mg magnesium) tablet Take 400 mg by mouth daily.   ? melatonin 3 mg Tab tablet Take 5 mg by mouth at bedtime as needed.   ? naproxen sodium (ALEVE) 220 MG tablet Take 220 mg by mouth at bedtime.   ? phenylephrine (NICOLE-SYNEPHRINE) 1 % Spry 1-2 sprays into each nostril every 4 (four) hours as needed.          ? polyethylene glycol (MIRALAX) 17 gram packet Take 1 packet (17 g total) by mouth daily.   ? sodium chloride (OCEAN) 0.65 % nasal spray 1 spray into each nostril as needed for congestion. Use with phenylephrine spray.         REVIEW OF SYSTEMS:    Currently, no fever, chills, or rigors. Does not have any visual or hearing problems. Denies any chest pain, headaches, palpitations, lightheadedness, dizziness, shortness of breath, or cough. Appetite is good. Denies any GERD symptoms. Denies any difficulty with swallowing, nausea, or vomiting.  Denies any abdominal pain, was constipated now with loose stools d/t prune juice. Denies any urinary symptoms other then retention,. No insomnia. No active bleeding other then ongoing hematuria. No rash.       PHYSICAL EXAMINATION:  Vitals:    10/08/19 1956   BP: 146/83   Pulse: 92   Temp: 98.4  F (36.9  C)   Weight: 198 lb 6.4 oz (90 kg)         GENERAL: Awake, Alert, oriented x3,unkempt in appearance,  not in any form of acute distress, answers questions appropriately, follows simple commands, conversant  HEENT: Head is normocephalic with normal hair distribution. No evidence of trauma. Ears: No acute purulent discharge. Eyes:  Conjunctivae pink with no scleral jaundice. Nose: Normal mucosa and septum. NECK: Supple with no cervical or supraclavicular lymphadenopathy. Trachea is midline.   CHEST: No tenderness or deformity, no crepitus  LUNG: Clear to auscultation with good chest expansion. There are no crackles or wheezes, normal AP diameter.  BACK: No kyphosis of the thoracic spine. Symmetric, no curvature, ROM normal, no CVA tenderness, no spinal tenderness   CVS: There is good S1  S2,  rhythm is regular.  ABDOMEN: Globular and soft, nontender to palpation, non distended, no masses, no organomegaly, good bowel sounds, no rebound or guarding, no peritoneal signs.   EXTREMITIES: ROM UE WNL, he is unable to bear wt on legs. In wc. Pedal pulses present, no edema, arthritic aged joint swelling, right knee very edematous. .Trace edema bilaterally  SKIN: Warm and dry, no erythema noted, no rashes or lesions.  NEUROLOGICAL: The patient is oriented to person, place and time. Strength and sensation are grossly intact. Face is symmetric.          LABS:    Lab Results   Component Value Date    WBC 7.3 09/08/2019    HGB 11.4 (L) 09/08/2019    HCT 35.2 (L) 09/08/2019    MCV 82 09/08/2019     09/08/2019       Results for orders placed or performed in visit on 10/01/19   Basic Metabolic Panel   Result Value Ref Range    Sodium 139 136 - 145 mmol/L    Potassium 4.4 3.5 - 5.0 mmol/L    Chloride 105 98 - 107 mmol/L    CO2 26 22 - 31 mmol/L    Anion Gap, Calculation 8 5 - 18 mmol/L    Glucose 87 70 - 125 mg/dL    Calcium 9.2 8.5 - 10.5 mg/dL    BUN 31 (H) 8 - 22 mg/dL    Creatinine 0.71 0.70 - 1.30 mg/dL    GFR MDRD Af Amer >60 >60 mL/min/1.73m2    GFR MDRD Non Af Amer >60 >60 mL/min/1.73m2           No results found for: HGBA1C  No results found for: [id number]  No results found for: [id number]    ASSESSMENT/PLAN:  1. Urinary retention    2. Anxiety      1.  Retention and. Urinary obstruction: Valentine in place, no hematuria  He has been cancelling  his  APTS.We had a lengthy discussion R/T need to have shen removed, risk benefits of infections are so much greater with indwelling cath. He continues to be non compliant with keeping  apt and refuses nurses to allow a trial    2 Anxiety: Reviewed ot anxiety and as per Dr Cisneros suggestions would like pt to start anti anxiety. Pt agin reuses any intervention. SW is going to re address this with pt as I would recommend we start some paxil but pt not willing to try..    Electronically signed by:  Anuradha Barrett CNP  This progress note was completed using Dragon software and there may be grammatical errors.

## 2021-06-20 NOTE — LETTER
"Letter by Anuradha Barrett CNP at      Author: Anuradha Barrett CNP Service: -- Author Type: --    Filed:  Encounter Date: 1/3/2020 Status: Signed         Patient: Rashad Caputo   MR Number: [id number]   YOB: 1950   Date of Visit: 1/3/2020       [facility]      NAME:  Rashad Caputo             :  1950    MRN: [id number]    CODE STATUS:  FULL CODE    FACILITY: [facility] [[id number]]         CHIEF COMPLAIN/REASON FOR VISIT:  Chief Complaint   Patient presents with   ? Review Of Multiple Medical Conditions       HISTORY OF PRESENT ILLNESS: Rashad Caputo is a 69 y.o. male being seen for review of multiple medical condition. He was on the TCU and sent to acute care due to gradual wt increase of 60 pounds or so. We had sent him in several times for lymphedema, but he was always sent back. But on day of dc from TCU, .Nurses reporedt that Rashad felt he was having a stroke and he couldn't speak other than garbled words. B/P  Pulse reviewed, stable. Speaking articulatley this am. He was up until 5 am and is sleepy, I suspect this is related to his speech change and fatigue. .I had ordered d dimer and it was elevated and we sent him in. He was finally admitted.He started out at [facility] then sent to [place], in from  to 2019. Per his dc summary from hospital \" presenting with hypothermia, sepsis secondary to probable catheter associated urinary tract infection, dysarthria, acute diastolic heart failure     Septic shock: Etiology presumed secondary to catheter associated urinary tract infection with urine culture MSSA.  Blood cultures x3 no growth to date.  Required ICU admission and initial vasopressor and broad-spectrum IV antibiotic support.  Current septic shock resolved.     Catheter associated UTI with sepsis: Urine culture MSSA.  Patient refused to have Valentine catheter exchanged for new one as he would like to follow-up as outpatient with his " "urologist.  I have placed an order for referral for [place] [facility] for him to be seen in the clinic within 7 days time to have catheter exchanged out for new one.  Counseled patient on the importance of exchanging catheter and he acknowledged understanding was able to recite potential complications of not following recommendations of exchanging catheter out for a clean 1.  He still refused.  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 WT creeping up, but did see cardio yesterday and had a bumex bump. Continues on daily wts and we are going to review a BMP next week.    Allergies   Allergen Reactions   ? Lisinopril Shortness Of Breath and Dizziness   ? Amoxicillin      Allergy is per [facility] records.   ? Fosinopril Sodium Other (See Comments) and Dizziness     Mouth numbness   ? Tramadol Other (See Comments)     sweating   ? Amoxicillin-Pot Clavulanate Hives and Rash   ? Cephalosporins Hives and Rash   ? Penicillins Rash   :     Current Outpatient Medications   Medication Sig   ? acetaminophen (TYLENOL) 500 MG tablet Take 1 tablet (500 mg total) by mouth every 4 (four) hours as needed for pain. Do not exceed 4000 mg in 24 hours.   ? aspirin 81 MG EC tablet Take 1 tablet (81 mg total) by mouth daily.   ? bumetanide (BUMEX) 1 MG tablet Take 1 tablet (1 mg total) by mouth daily.   ? carvedilol (COREG) 6.25 MG tablet Take 1 tablet (6.25 mg total) by mouth 2 (two) times a day.   ? cyclobenzaprine (FLEXERIL) 10 MG tablet Take 1 tablet (10 mg total) by mouth every 12 (twelve) hours as needed for muscle spasms.   ? diclofenac sodium (VOLTAREN) 1 % Gel Apply topically every 8 (eight) hours as needed (pain). Apply to arm/knee.   ? fluticasone propionate (FLONASE) 50 mcg/actuation nasal spray Apply 1 spray into each nostril 2 (two) times a " day.   ? gabapentin (NEURONTIN) 100 MG capsule Take 200 mg by mouth 3 (three) times a day.   ? hydrocortisone (CORTEF) 5 MG tablet Take 1 tablet (5 mg total) by mouth 2 (two) times a day. Do not stop this medicine unless MD discontinues due to possible Adrenal Insufficiency Diagnosis   ? magnesium oxide (MAG-OX) 400 mg (241.3 mg magnesium) tablet Take 400 mg by mouth 2 (two) times a day.    ? polyethylene glycol (MIRALAX) 17 gram packet Take 1 packet (17 g total) by mouth daily.   ? QUEtiapine (SEROQUEL) 50 MG tablet Take 50 mg by mouth at bedtime.   ? senna-docusate (PERICOLACE) 8.6-50 mg tablet Take 1 tablet by mouth 2 (two) times a day.   ? sodium chloride (OCEAN) 0.65 % nasal spray Apply 1 spray into each nostril every 4 (four) hours as needed for congestion. Use with phenylephrine spray.    ? tamsulosin (FLOMAX) 0.4 mg cap Take 0.4 mg by mouth Daily after breakfast.          ? thiamine 100 MG tablet Take 1 tablet (100 mg total) by mouth daily.         REVIEW OF SYSTEMS:    Currently, no fever, chills, or rigors. Does not have any visual or hearing problems. Denies any chest pain, headaches, palpitations, lightheadedness, dizziness, shortness of breath, or cough. Appetite is good. Denies any GERD symptoms. Denies any difficulty with swallowing, nausea, or vomiting.  Denies any abdominal pain,  with loose stools d/t prune juice. Denies any urinary symptoms other then retention,. No insomnia. No active bleeding . No rash. Pain to left arm has been stable      PHYSICAL EXAMINATION:  Vitals:    01/05/20 1442   BP: 153/89   Pulse: 76   Temp: (!) 96.5  F (35.8  C)   Weight: 213 lb 9.6 oz (96.9 kg)         GENERAL: Awake, Alert, oriented x3,unkempt in appearance,  not in any form of acute distress, answers questions appropriately, follows simple commands, conversant  HEENT: Head is normocephalic with normal hair distribution. No evidence of trauma. Ears: No acute purulent discharge. Eyes: Conjunctivae pink with no  scleral jaundice. Nose: Normal mucosa and septum. NECK: Supple with no cervical or supraclavicular lymphadenopathy. Trachea is midline.   CHEST: No tenderness or deformity, no crepitus  LUNG: Clear to auscultation with good chest expansion. There are no crackles or wheezes, normal AP diameter.  BACK: No kyphosis of the thoracic spine. Symmetric, no curvature, ROM normal, no CVA tenderness, no spinal tenderness   CVS: There is good S1  S2,  rhythm is regular.  ABDOMEN: Globular and soft, nontender to palpation, non distended, no masses, no organomegaly, good bowel sounds, no rebound or guarding, no peritoneal signs.   EXTREMITIES: ROM UE WNL, left arm with increasedSwelling, lymphedema to legs bilaterally, refer to therapy for lymph wraps.  SKIN: Warm and dry, no erythema noted, no rashes or lesions.  NEUROLOGICAL: The patient is oriented to person, place and time. Strength and sensation are grossly intact. Face is symmetric.          LABS:    Lab Results   Component Value Date    WBC 7.8 12/30/2019    HGB 9.7 (L) 12/30/2019    HCT 31.2 (L) 12/30/2019    MCV 83 12/30/2019     12/30/2019       Results for orders placed or performed in visit on 12/30/19   Basic Metabolic Panel   Result Value Ref Range    Sodium 137 136 - 145 mmol/L    Potassium 4.0 3.5 - 5.0 mmol/L    Chloride 100 98 - 107 mmol/L    CO2 30 22 - 31 mmol/L    Anion Gap, Calculation 7 5 - 18 mmol/L    Glucose 78 70 - 125 mg/dL    Calcium 9.3 8.5 - 10.5 mg/dL    BUN 21 8 - 22 mg/dL    Creatinine 0.60 (L) 0.70 - 1.30 mg/dL    GFR MDRD Af Amer >60 >60 mL/min/1.73m2    GFR MDRD Non Af Amer >60 >60 mL/min/1.73m2           Lab Results   Component Value Date    HGBA1C 5.3 12/11/2019     No results found for: [id number]  Lab Results   Component Value Date    [id number] 321 12/11/2019       ASSESSMENT/PLAN:  1. Acute diastolic CHF (congestive heart failure), NYHA class 3 (H)    2. Urinary retention      1.chf: FU apt with cardio, increased his Bumex to 2  mg po daily X3 days. I did add a BMP to labs next week. Wts daily per nursing.    2.Urinary Retention: Shen un place, he was to see  in 5 days due to retention.but once again cancelled appointment. Encouraged to follow clinical advise for best health outcomes. Nursing staff provides shen care.    Electronically signed by:  Anuradha Barrett CNP  This progress note was completed using Dragon software and there may be grammatical errors.

## 2021-06-20 NOTE — LETTER
"Letter by Anuradha Barrett CNP at      Author: Anuradha Barrett CNP Service: -- Author Type: --    Filed:  Encounter Date: 2020 Status: Signed         Patient: Rashad Caputo   MR Number: [id number]   YOB: 1950   Date of Visit: 2020       [facility]      NAME:  Rashad Caputo             :  1950    MRN: [id number]    CODE STATUS:  FULL CODE    FACILITY: [facility] [[id number]]         CHIEF COMPLAIN/REASON FOR VISIT:  Chief Complaint   Patient presents with   ? Review Of Multiple Medical Conditions       HISTORY OF PRESENT ILLNESS: Rashad Caputo is a 69 y.o. male being seen per his request. He has concern of cerumen build up. Ears assessed and they do have impaction bilaterally. . He was on the TCU and sent to acute care due to gradual wt increase of 60 pounds or so. We had sent him in several times for lymphedema, but he was always sent back. But on day of dc from TCU, .Nurses reporedt that Rashad felt he was having a stroke and he couldn't speak other than garbled words. B/P  Pulse reviewed, stable. Speaking articulatley this am. He was up until 5 am and is sleepy, I suspect this is related to his speech change and fatigue. .I had ordered d dimer and it was elevated and we sent him in. He was finally admitted.He started out at [facility] then sent to [place], in from  to 2019. Per his dc summary from hospital \" presenting with hypothermia, sepsis secondary to probable catheter associated urinary tract infection, dysarthria, acute diastolic heart failure     Septic shock: Etiology presumed secondary to catheter associated urinary tract infection with urine culture MSSA.  Blood cultures x3 no growth to date.  Required ICU admission and initial vasopressor and broad-spectrum IV antibiotic support.  Current septic shock resolved.     Catheter associated UTI with sepsis: Urine culture MSSA.  Patient refused to have Valentine catheter " "exchanged for new one as he would like to follow-up as outpatient with his urologist.  I have placed an order for referral for [place] [facility] for him to be seen in the clinic within 7 days time to have catheter exchanged out for new one.  Counseled patient on the importance of exchanging catheter and he acknowledged understanding was able to recite potential complications of not following recommendations of exchanging catheter out for a clean 1.  He still refused.  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 Ears assessed today.    Allergies   Allergen Reactions   ? Lisinopril Shortness Of Breath and Dizziness   ? Amoxicillin      Allergy is per [facility] records.   ? Fosinopril Sodium Other (See Comments) and Dizziness     Mouth numbness   ? Tramadol Other (See Comments)     sweating   ? Amoxicillin-Pot Clavulanate Hives and Rash   ? Cephalosporins Hives and Rash   ? Penicillins Rash   :     Current Outpatient Medications   Medication Sig   ? acetaminophen (TYLENOL) 500 MG tablet Take 1 tablet (500 mg total) by mouth every 4 (four) hours as needed for pain. Do not exceed 4000 mg in 24 hours.   ? aspirin 81 MG EC tablet Take 1 tablet (81 mg total) by mouth daily.   ? bumetanide (BUMEX) 1 MG tablet Take 1 tablet (1 mg total) by mouth daily.   ? carvedilol (COREG) 6.25 MG tablet Take 1 tablet (6.25 mg total) by mouth 2 (two) times a day.   ? cyclobenzaprine (FLEXERIL) 10 MG tablet Take 1 tablet (10 mg total) by mouth every 12 (twelve) hours as needed for muscle spasms.   ? diclofenac sodium (VOLTAREN) 1 % Gel Apply topically every 8 (eight) hours as needed (pain). Apply to arm/knee.   ? fluticasone propionate (FLONASE) 50 mcg/actuation nasal spray Apply 1 spray into each nostril 2 (two) times a day.   ? gabapentin (NEURONTIN) 100 MG " capsule Take 200 mg by mouth 3 (three) times a day.   ? hydrocortisone (CORTEF) 5 MG tablet Take 1 tablet (5 mg total) by mouth 2 (two) times a day. Do not stop this medicine unless MD discontinues due to possible Adrenal Insufficiency Diagnosis   ? magnesium oxide (MAG-OX) 400 mg (241.3 mg magnesium) tablet Take 400 mg by mouth 2 (two) times a day.    ? polyethylene glycol (MIRALAX) 17 gram packet Take 1 packet (17 g total) by mouth daily.   ? QUEtiapine (SEROQUEL) 50 MG tablet Take 50 mg by mouth at bedtime.   ? senna-docusate (PERICOLACE) 8.6-50 mg tablet Take 1 tablet by mouth 2 (two) times a day.   ? sodium chloride (OCEAN) 0.65 % nasal spray Apply 1 spray into each nostril every 4 (four) hours as needed for congestion. Use with phenylephrine spray.    ? tamsulosin (FLOMAX) 0.4 mg cap Take 0.4 mg by mouth Daily after breakfast.          ? thiamine 100 MG tablet Take 1 tablet (100 mg total) by mouth daily.         REVIEW OF SYSTEMS:    Currently, no fever, chills, or rigors. Does not have any visual or hearing problems. Denies any chest pain, headaches, palpitations, lightheadedness, dizziness, shortness of breath, or cough. Appetite is good. Denies any GERD symptoms. Denies any difficulty with swallowing, nausea, or vomiting.  Denies any abdominal pain,  with loose stools d/t prune juice. Denies any urinary symptoms other then retention,. No insomnia. No active bleeding . No rash. Pain to left arm has been stable      PHYSICAL EXAMINATION:  Vitals:    01/06/20 2108   BP: 137/80   Pulse: 82   Temp: 96.8  F (36  C)   Weight: 211 lb 3.2 oz (95.8 kg)         GENERAL: Awake, Alert, oriented x3,unkempt in appearance,  not in any form of acute distress, answers questions appropriately, follows simple commands, conversant  HEENT: Head is normocephalic with normal hair distribution. No evidence of trauma. Ears: No acute purulent discharge, bilateral wax observed.. Eyes: Conjunctivae pink with no scleral jaundice. Nose:  Normal mucosa and septum. NECK: Supple with no cervical or supraclavicular lymphadenopathy. Trachea is midline.   CHEST: No tenderness or deformity, no crepitus  LUNG: Clear to auscultation with good chest expansion. There are no crackles or wheezes, normal AP diameter.  BACK: No kyphosis of the thoracic spine. Symmetric, no curvature, ROM normal, no CVA tenderness, no spinal tenderness   CVS: There is good S1  S2,  rhythm is regular.  ABDOMEN: Globular and soft, nontender to palpation, non distended, no masses, no organomegaly, good bowel sounds, no rebound or guarding, no peritoneal signs.   EXTREMITIES: ROM UE WNL, left arm with increasedSwelling, lymphedema to legs bilaterally, refer to therapy for lymph wraps.  SKIN: Warm and dry, no erythema noted, no rashes or lesions.  NEUROLOGICAL: The patient is oriented to person, place and time. Strength and sensation are grossly intact. Face is symmetric.          LABS:    Lab Results   Component Value Date    WBC 7.1 01/06/2020    HGB 9.8 (L) 01/06/2020    HCT 31.9 (L) 01/06/2020    MCV 82 01/06/2020     01/06/2020       Results for orders placed or performed in visit on 01/06/20   Basic Metabolic Panel   Result Value Ref Range    Sodium 139 136 - 145 mmol/L    Potassium 4.0 3.5 - 5.0 mmol/L    Chloride 101 98 - 107 mmol/L    CO2 31 22 - 31 mmol/L    Anion Gap, Calculation 7 5 - 18 mmol/L    Glucose 104 70 - 125 mg/dL    Calcium 9.2 8.5 - 10.5 mg/dL    BUN 24 (H) 8 - 22 mg/dL    Creatinine 0.65 (L) 0.70 - 1.30 mg/dL    GFR MDRD Af Amer >60 >60 mL/min/1.73m2    GFR MDRD Non Af Amer >60 >60 mL/min/1.73m2           Lab Results   Component Value Date    HGBA1C 5.3 12/11/2019     No results found for: [id number]  Lab Results   Component Value Date    [id number] 321 12/11/2019       ASSESSMENT/PLAN:  1. Lymphedema    2. Bilateral impacted cerumen      1. Debilitated pt/has Lymphedema.: We will review therapy needs, debilitated and wreaked but basically debilitated  at baseline. Discharge planning omgoing, will need LTC. Pt continues with therapy. Continues to refuse to wear tubi's or other compression.    2. Bilateral cerumen of ears: Right ear impacted, left ear also with soft tan cerumen, offered to use debrox. Pt refused. He requested ENT referral as he does nott want nurses to flush, referral made.        Electronically signed by:  Anuradha Barrett CNP  This progress note was completed using Dragon software and there may be grammatical errors.

## 2021-06-20 NOTE — LETTER
"Letter by Anuradha Barrett CNP at      Author: Anuradha Barrett CNP Service: -- Author Type: --    Filed:  Encounter Date: 2019 Status: Signed         Patient: Rashad Caputo   MR Number: [id number]   YOB: 1950   Date of Visit: 2019       [facility]      NAME:  Rashad Caputo             :  1950    MRN: [id number]    CODE STATUS:  FULL CODE    FACILITY: [facility] [[id number]]         CHIEF COMPLAIN/REASON FOR VISIT:  Chief Complaint   Patient presents with   ? Review Of Multiple Medical Conditions       HISTORY OF PRESENT ILLNESS: Rashad Caputo is a 69 y.o. male being seen for review of multiple medical condition. He was on the TCU and sent to acute care due to gradual wt increase of 60 pounds or so. We had sent him in several times for lymphedema, but he was always sent back. But on day of dc from TCU, .Nurses reporedt that Rashad felt he was having a stroke and he couldn't speak other than garbled words. B/P  Pulse reviewed, stable. Speaking articulatley this am. He was up until 5 am and is sleepy, I suspect this is related to his speech change and fatigue. .I had ordered d dimer and it was elevated and we sent him in. He was finally admitted.He started out at [facility] then sent to [place], in from  to 2019. Per his dc summary from hospital \" presenting with hypothermia, sepsis secondary to probable catheter associated urinary tract infection, dysarthria, acute diastolic heart failure     Septic shock: Etiology presumed secondary to catheter associated urinary tract infection with urine culture MSSA.  Blood cultures x3 no growth to date.  Required ICU admission and initial vasopressor and broad-spectrum IV antibiotic support.  Current septic shock resolved.     Catheter associated UTI with sepsis: Urine culture MSSA.  Patient refused to have Valentine catheter exchanged for new one as he would like to follow-up as outpatient with " "his urologist.  I have placed an order for referral for [place] [facility] for him to be seen in the clinic within 7 days time to have catheter exchanged out for new one.  Counseled patient on the importance of exchanging catheter and he acknowledged understanding was able to recite potential complications of not following recommendations of exchanging catheter out for a clean 1.  He still refused.  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 We reviewed meds, diet recommendations and ongoing health concerns with ac. He is reported to have cancelled last  apt. We discussed healthy eating habits wt at 199 today.    Allergies   Allergen Reactions   ? Lisinopril Shortness Of Breath and Dizziness   ? Amoxicillin      Allergy is per [facility] records.   ? Fosinopril Sodium Other (See Comments) and Dizziness     Mouth numbness   ? Tramadol Other (See Comments)     sweating   ? Amoxicillin-Pot Clavulanate Hives and Rash   ? Cephalosporins Hives and Rash   ? Penicillins Rash   :     Current Outpatient Medications   Medication Sig   ? acetaminophen (TYLENOL) 500 MG tablet Take 1 tablet (500 mg total) by mouth every 4 (four) hours as needed for pain. Do not exceed 4000 mg in 24 hours.   ? aspirin 81 MG EC tablet Take 1 tablet (81 mg total) by mouth daily.   ? bumetanide (BUMEX) 1 MG tablet Take 1 tablet (1 mg total) by mouth daily.   ? carvedilol (COREG) 6.25 MG tablet Take 1 tablet (6.25 mg total) by mouth 2 (two) times a day.   ? cyclobenzaprine (FLEXERIL) 10 MG tablet Take 1 tablet (10 mg total) by mouth every 12 (twelve) hours as needed for muscle spasms.   ? diclofenac sodium (VOLTAREN) 1 % Gel Apply topically every 8 (eight) hours as needed (pain). Apply to arm/knee.   ? fluticasone propionate (FLONASE) 50 mcg/actuation nasal spray " Apply 1 spray into each nostril 2 (two) times a day.   ? gabapentin (NEURONTIN) 100 MG capsule Take 200 mg by mouth 3 (three) times a day.   ? hydrocortisone (CORTEF) 5 MG tablet Take 1 tablet (5 mg total) by mouth 2 (two) times a day. Do not stop this medicine unless MD discontinues due to possible Adrenal Insufficiency Diagnosis   ? magnesium oxide (MAG-OX) 400 mg (241.3 mg magnesium) tablet Take 400 mg by mouth 2 (two) times a day.    ? polyethylene glycol (MIRALAX) 17 gram packet Take 1 packet (17 g total) by mouth daily.   ? QUEtiapine (SEROQUEL) 50 MG tablet Take 50 mg by mouth at bedtime.   ? senna-docusate (PERICOLACE) 8.6-50 mg tablet Take 1 tablet by mouth 2 (two) times a day.   ? sodium chloride (OCEAN) 0.65 % nasal spray Apply 1 spray into each nostril every 4 (four) hours as needed for congestion. Use with phenylephrine spray.    ? tamsulosin (FLOMAX) 0.4 mg cap Take 0.4 mg by mouth Daily after breakfast.          ? thiamine 100 MG tablet Take 1 tablet (100 mg total) by mouth daily.         REVIEW OF SYSTEMS:    Currently, no fever, chills, or rigors. Does not have any visual or hearing problems. Denies any chest pain, headaches, palpitations, lightheadedness, dizziness, shortness of breath, or cough. Appetite is good. Denies any GERD symptoms. Denies any difficulty with swallowing, nausea, or vomiting.  Denies any abdominal pain,  with loose stools d/t prune juice. Denies any urinary symptoms other then retention,. No insomnia. No active bleeding . No rash. Pain to left arm has been stable      PHYSICAL EXAMINATION:  Vitals:    12/30/19 1922   BP: 130/79   Pulse: 75   Temp: 97  F (36.1  C)   Weight: 199 lb 12.8 oz (90.6 kg)         GENERAL: Awake, Alert, oriented x3,unkempt in appearance,  not in any form of acute distress, answers questions appropriately, follows simple commands, conversant  HEENT: Head is normocephalic with normal hair distribution. No evidence of trauma. Ears: No acute purulent  discharge. Eyes: Conjunctivae pink with no scleral jaundice. Nose: Normal mucosa and septum. NECK: Supple with no cervical or supraclavicular lymphadenopathy. Trachea is midline.   CHEST: No tenderness or deformity, no crepitus  LUNG: Clear to auscultation with good chest expansion. There are no crackles or wheezes, normal AP diameter.  BACK: No kyphosis of the thoracic spine. Symmetric, no curvature, ROM normal, no CVA tenderness, no spinal tenderness   CVS: There is good S1  S2,  rhythm is regular.  ABDOMEN: Globular and soft, nontender to palpation, non distended, no masses, no organomegaly, good bowel sounds, no rebound or guarding, no peritoneal signs.   EXTREMITIES: ROM UE WNL, left arm with increasedSwelling, lymphedema to legs bilaterally, refer to therapy for lymph wraps.  SKIN: Warm and dry, no erythema noted, no rashes or lesions.  NEUROLOGICAL: The patient is oriented to person, place and time. Strength and sensation are grossly intact. Face is symmetric.          LABS:    Lab Results   Component Value Date    WBC 7.8 12/30/2019    HGB 9.7 (L) 12/30/2019    HCT 31.2 (L) 12/30/2019    MCV 83 12/30/2019     12/30/2019       Results for orders placed or performed in visit on 12/30/19   Basic Metabolic Panel   Result Value Ref Range    Sodium 137 136 - 145 mmol/L    Potassium 4.0 3.5 - 5.0 mmol/L    Chloride 100 98 - 107 mmol/L    CO2 30 22 - 31 mmol/L    Anion Gap, Calculation 7 5 - 18 mmol/L    Glucose 78 70 - 125 mg/dL    Calcium 9.3 8.5 - 10.5 mg/dL    BUN 21 8 - 22 mg/dL    Creatinine 0.60 (L) 0.70 - 1.30 mg/dL    GFR MDRD Af Amer >60 >60 mL/min/1.73m2    GFR MDRD Non Af Amer >60 >60 mL/min/1.73m2           Lab Results   Component Value Date    HGBA1C 5.3 12/11/2019     No results found for: [id number]  Lab Results   Component Value Date    [id number] 321 12/11/2019       ASSESSMENT/PLAN:  1. Debilitated patient    2. Benign prostatic hyperplasia with urinary retention      1. Debilitated pt.:  We will review therapy needs, debilitated and wreaked but basically debilitated at baseline. Discharge planning omgoing, will need LTC. Pt continues with therapy. Has no new concerns.    3.Urinary Retention: Valentine un place, he was to see  in 5 days due to retention.but once again cancelled appointment. Encouraged to follow clinical advise for best health outcomes.    Electronically signed by:  Anuradha Barrett CNP  This progress note was completed using Dragon software and there may be grammatical errors.

## 2021-06-20 NOTE — LETTER
"Letter by Anuradha Barrett CNP at      Author: Anuradha Barrett CNP Service: -- Author Type: --    Filed:  Encounter Date: 2019 Status: Signed         Patient: Rashad Caputo   MR Number: [id number]   YOB: 1950   Date of Visit: 2019       [facility]      NAME:  Rashad Caputo             :  1950    MRN: [id number]    CODE STATUS:  FULL CODE    FACILITY: [facility] [[id number]]         CHIEF COMPLAIN/REASON FOR VISIT:  Chief Complaint   Patient presents with   ? Review Of Multiple Medical Conditions       HISTORY OF PRESENT ILLNESS: Rashad Caputo is a 69 y.o. male being seen for review of multiple medical condition. He was on the TCU and sent to acute care due to gradual wt increase of 60 pounds or so. We had sent him in several times for lymphedema, but he was always sent back. But on day of dc from TCU, .Nurses reporedt that Rashad felt he was having a stroke and he couldn't speak other than garbled words. B/P  Pulse reviewed, stable. Speaking articulatley this am. He was up until 5 am and is sleepy, I suspect this is related to his speech change and fatigue. .I had ordered d dimer and it was elevated and we sent him in. He was finally admitted.He started out at [facility] then sent to [place], in from  to 2019. Per his dc summary from hospital \" presenting with hypothermia, sepsis secondary to probable catheter associated urinary tract infection, dysarthria, acute diastolic heart failure     Septic shock: Etiology presumed secondary to catheter associated urinary tract infection with urine culture MSSA.  Blood cultures x3 no growth to date.  Required ICU admission and initial vasopressor and broad-spectrum IV antibiotic support.  Current septic shock resolved.     Catheter associated UTI with sepsis: Urine culture MSSA.  Patient refused to have Valentine catheter exchanged for new one as he would like to follow-up as outpatient with " "his urologist.  I have placed an order for referral for [place] [facility] for him to be seen in the clinic within 7 days time to have catheter exchanged out for new one.  Counseled patient on the importance of exchanging catheter and he acknowledged understanding was able to recite potential complications of not following recommendations of exchanging catheter out for a clean 1.  He still refused.  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 We reviewed meds, TCU routime and ongoing POLST and advance care planning.       Allergies   Allergen Reactions   ? Lisinopril Shortness Of Breath and Dizziness   ? Amoxicillin      Allergy is per [facility] records.   ? Fosinopril Sodium Other (See Comments) and Dizziness     Mouth numbness   ? Tramadol Other (See Comments)     sweating   ? Amoxicillin-Pot Clavulanate Hives and Rash   ? Cephalosporins Hives and Rash   ? Penicillins Rash   :     Current Outpatient Medications   Medication Sig   ? acetaminophen (TYLENOL) 500 MG tablet Take 1 tablet (500 mg total) by mouth every 4 (four) hours as needed for pain. Do not exceed 4000 mg in 24 hours.   ? aspirin 81 MG EC tablet Take 1 tablet (81 mg total) by mouth daily.   ? bumetanide (BUMEX) 1 MG tablet Take 1 tablet (1 mg total) by mouth daily.   ? carvedilol (COREG) 6.25 MG tablet Take 1 tablet (6.25 mg total) by mouth 2 (two) times a day.   ? cyclobenzaprine (FLEXERIL) 10 MG tablet Take 1 tablet (10 mg total) by mouth every 12 (twelve) hours as needed for muscle spasms.   ? diclofenac sodium (VOLTAREN) 1 % Gel Apply topically every 8 (eight) hours as needed (pain). Apply to arm/knee.   ? fluticasone propionate (FLONASE) 50 mcg/actuation nasal spray Apply 1 spray into each nostril 2 (two) times a day.   ? gabapentin (NEURONTIN) 100 MG capsule Take " 200 mg by mouth 3 (three) times a day.   ? hydrocortisone (CORTEF) 5 MG tablet Take 1 tablet (5 mg total) by mouth 2 (two) times a day. Do not stop this medicine unless MD discontinues due to possible Adrenal Insufficiency Diagnosis   ? magnesium oxide (MAG-OX) 400 mg (241.3 mg magnesium) tablet Take 400 mg by mouth daily.   ? polyethylene glycol (MIRALAX) 17 gram packet Take 1 packet (17 g total) by mouth daily.   ? QUEtiapine (SEROQUEL) 50 MG tablet Take 50 mg by mouth at bedtime.   ? senna-docusate (PERICOLACE) 8.6-50 mg tablet Take 1 tablet by mouth 2 (two) times a day.   ? sodium chloride (OCEAN) 0.65 % nasal spray Apply 1 spray into each nostril every 4 (four) hours as needed for congestion. Use with phenylephrine spray.    ? tamsulosin (FLOMAX) 0.4 mg cap Take 0.4 mg by mouth Daily after breakfast.          ? thiamine 100 MG tablet Take 1 tablet (100 mg total) by mouth daily.         REVIEW OF SYSTEMS:    Currently, no fever, chills, or rigors. Does not have any visual or hearing problems. Denies any chest pain, headaches, palpitations, lightheadedness, dizziness, shortness of breath, or cough. Appetite is good. Denies any GERD symptoms. Denies any difficulty with swallowing, nausea, or vomiting.  Denies any abdominal pain,  with loose stools d/t prune juice. Denies any urinary symptoms other then retention,. No insomnia. No active bleeding . No rash. Pain to left arm      PHYSICAL EXAMINATION:  Vitals:    12/23/19 1840   BP: 95/63   Pulse: 69   Temp: 96.9  F (36.1  C)   Weight: 197 lb (89.4 kg)         GENERAL: Awake, Alert, oriented x3,unkempt in appearance,  not in any form of acute distress, answers questions appropriately, follows simple commands, conversant  HEENT: Head is normocephalic with normal hair distribution. No evidence of trauma. Ears: No acute purulent discharge. Eyes: Conjunctivae pink with no scleral jaundice. Nose: Normal mucosa and septum. NECK: Supple with no cervical or supraclavicular  lymphadenopathy. Trachea is midline.   CHEST: No tenderness or deformity, no crepitus  LUNG: Clear to auscultation with good chest expansion. There are no crackles or wheezes, normal AP diameter.  BACK: No kyphosis of the thoracic spine. Symmetric, no curvature, ROM normal, no CVA tenderness, no spinal tenderness   CVS: There is good S1  S2,  rhythm is regular.  ABDOMEN: Globular and soft, nontender to palpation, non distended, no masses, no organomegaly, good bowel sounds, no rebound or guarding, no peritoneal signs.   EXTREMITIES: ROM UE WNL, left arm with increasedSwelling, lymphedema to legs bilaterally, refer to therapy for lymph wraps.  SKIN: Warm and dry, no erythema noted, no rashes or lesions.  NEUROLOGICAL: The patient is oriented to person, place and time. Strength and sensation are grossly intact. Face is symmetric.          LABS:    Lab Results   Component Value Date    WBC 10.5 12/18/2019    HGB 9.6 (L) 12/18/2019    HCT 30.6 (L) 12/18/2019    MCV 83 12/18/2019     12/18/2019       Results for orders placed or performed during the hospital encounter of 12/09/19   Basic Metabolic Panel   Result Value Ref Range    Sodium 136 136 - 145 mmol/L    Potassium 4.4 3.5 - 5.0 mmol/L    Chloride 102 98 - 107 mmol/L    CO2 29 22 - 31 mmol/L    Anion Gap, Calculation 5 5 - 18 mmol/L    Glucose 98 70 - 125 mg/dL    Calcium 8.6 8.5 - 10.5 mg/dL    BUN 40 (H) 8 - 22 mg/dL    Creatinine 0.73 0.70 - 1.30 mg/dL    GFR MDRD Af Amer >60 >60 mL/min/1.73m2    GFR MDRD Non Af Amer >60 >60 mL/min/1.73m2           Lab Results   Component Value Date    HGBA1C 5.3 12/11/2019     No results found for: [id number]  Lab Results   Component Value Date    [id number] 321 12/11/2019       ASSESSMENT/PLAN:  1. Other hypervolemia    2. Debilitated patient    3. Urinary retention      1. Fluid overload : Return from acute care with 60 pound wt loss diuressing. We reviewed diet and fluid intake today.        2. Debilitated pt.: We  will review therapy needs, debilitated and wreaked but basically debilitated at baseline. Discharge planning omgoing, will need LTC.    3.Urinary Retention: Valentine un place, he is to see  in 5 days due to retention. HO ongoing UTI'S.    4, ACP: Greater then 16 minutes spent face to face with Rashad reviewing POC including advance care planning and review of POLST which is signed and is full code.                  Electronically signed by:  Anuradha Barrett CNP  This progress note was completed using Dragon software and there may be grammatical errors.

## 2021-06-20 NOTE — LETTER
"Letter by Anuradha Barrett CNP at      Author: Anuradha Barrett CNP Service: -- Author Type: --    Filed:  Encounter Date: 2020 Status: Signed         Patient: Rashad Caputo   MR Number: [id number]   YOB: 1950   Date of Visit: 2020       [facility]      NAME:  Rashad Caputo             :  1950    MRN: [id number]    CODE STATUS:  FULL CODE    FACILITY: [facility] [[id number]]         CHIEF COMPLAIN/REASON FOR VISIT:  Chief Complaint   Patient presents with   ? Review Of Multiple Medical Conditions       HISTORY OF PRESENT ILLNESS: Rashad Caputo is a 69 y.o. male being seen per his request. He has concern of cerumen build up. Ears assessed and they do have impaction bilaterally. . He was on the TCU and sent to acute care due to gradual wt increase of 60 pounds or so. We had sent him in several times for lymphedema, but he was always sent back. But on day of dc from TCU, .Nurses reporedt that Rashad felt he was having a stroke and he couldn't speak other than garbled words. B/P  Pulse reviewed, stable. Speaking articulatley this am. He was up until 5 am and is sleepy, I suspect this is related to his speech change and fatigue. .I had ordered d dimer and it was elevated and we sent him in. He was finally admitted.He started out at [facility] then sent to [place], in from  to 2019. Per his dc summary from hospital \" presenting with hypothermia, sepsis secondary to probable catheter associated urinary tract infection, dysarthria, acute diastolic heart failure     Septic shock: Etiology presumed secondary to catheter associated urinary tract infection with urine culture MSSA.  Blood cultures x3 no growth to date.  Required ICU admission and initial vasopressor and broad-spectrum IV antibiotic support.  Current septic shock resolved.     Catheter associated UTI with sepsis: Urine culture MSSA.  Patient refused to have Valentine catheter " "exchanged for new one as he would like to follow-up as outpatient with his urologist.  I have placed an order for referral for [place] [facility] for him to be seen in the clinic within 7 days time to have catheter exchanged out for new one.  Counseled patient on the importance of exchanging catheter and he acknowledged understanding was able to recite potential complications of not following recommendations of exchanging catheter out for a clean 1.  He still refused.  Patient will complete doxycycline as advised by infectious disease who have now signed off with last dose to be completed on 12/20/2019.\"  Wt at 213 today, continues with dietary non compliance but has CHF and on bumex.    Allergies   Allergen Reactions   ? Lisinopril Shortness Of Breath and Dizziness   ? Amoxicillin      Allergy is per [facility] records.   ? Fosinopril Sodium Other (See Comments) and Dizziness     Mouth numbness   ? Tramadol Other (See Comments)     sweating   ? Amoxicillin-Pot Clavulanate Hives and Rash   ? Cephalosporins Hives and Rash   ? Penicillins Rash   :     Current Outpatient Medications   Medication Sig   ? acetaminophen (TYLENOL) 500 MG tablet Take 1 tablet (500 mg total) by mouth every 4 (four) hours as needed for pain. Do not exceed 4000 mg in 24 hours.   ? aspirin 81 MG EC tablet Take 1 tablet (81 mg total) by mouth daily.   ? bumetanide (BUMEX) 1 MG tablet Take 1 tablet (1 mg total) by mouth daily.   ? carvedilol (COREG) 6.25 MG tablet Take 1 tablet (6.25 mg total) by mouth 2 (two) times a day.   ? cyclobenzaprine (FLEXERIL) 10 MG tablet Take 1 tablet (10 mg total) by mouth every 12 (twelve) hours as needed for muscle spasms.   ? diclofenac sodium (VOLTAREN) 1 % Gel Apply topically every 8 (eight) hours as needed (pain). Apply to arm/knee.   ? fluticasone propionate (FLONASE) 50 mcg/actuation nasal spray Apply 1 spray into each nostril 2 (two) times a day.   ? gabapentin (NEURONTIN) 100 MG capsule " Take 200 mg by mouth 3 (three) times a day.   ? hydrocortisone (CORTEF) 5 MG tablet Take 1 tablet (5 mg total) by mouth 2 (two) times a day. Do not stop this medicine unless MD discontinues due to possible Adrenal Insufficiency Diagnosis   ? magnesium oxide (MAG-OX) 400 mg (241.3 mg magnesium) tablet Take 400 mg by mouth 2 (two) times a day.    ? polyethylene glycol (MIRALAX) 17 gram packet Take 1 packet (17 g total) by mouth daily.   ? QUEtiapine (SEROQUEL) 50 MG tablet Take 50 mg by mouth at bedtime.   ? senna-docusate (PERICOLACE) 8.6-50 mg tablet Take 1 tablet by mouth 2 (two) times a day.   ? sodium chloride (OCEAN) 0.65 % nasal spray Apply 1 spray into each nostril every 4 (four) hours as needed for congestion. Use with phenylephrine spray.    ? tamsulosin (FLOMAX) 0.4 mg cap Take 0.4 mg by mouth Daily after breakfast.          ? thiamine 100 MG tablet Take 1 tablet (100 mg total) by mouth daily.         REVIEW OF SYSTEMS:    Currently, no fever, chills, or rigors. Does not have any visual or hearing problems. Denies any chest pain, headaches, palpitations, lightheadedness, dizziness, shortness of breath, or cough. Appetite is good. Denies any GERD symptoms. Denies any difficulty with swallowing, nausea, or vomiting.  Denies any abdominal pain,  with loose stools d/t prune juice. Denies any urinary symptoms other then retention,. No insomnia. No active bleeding . No rash. Pain to left arm has been stable      PHYSICAL EXAMINATION:  Vitals:    01/09/20 0554   BP: 112/71   Pulse: 90   Temp: 97.1  F (36.2  C)   Weight: 213 lb (96.6 kg)         GENERAL: Awake, Alert, oriented x3,unkempt in appearance,  not in any form of acute distress, answers questions appropriately, follows simple commands, conversant  HEENT: Head is normocephalic with normal hair distribution. No evidence of trauma. Ears: No acute purulent discharge, bilateral wax observed.. Eyes: Conjunctivae pink with no scleral jaundice. Nose: Normal mucosa  and septum. NECK: Supple with no cervical or supraclavicular lymphadenopathy. Trachea is midline.   CHEST: No tenderness or deformity, no crepitus  LUNG: Clear to auscultation with good chest expansion. There are no crackles or wheezes, normal AP diameter.  BACK: No kyphosis of the thoracic spine. Symmetric, no curvature, ROM normal, no CVA tenderness, no spinal tenderness   CVS: There is good S1  S2,  rhythm is regular.  ABDOMEN: Globular and soft, nontender to palpation, non distended, no masses, no organomegaly, good bowel sounds, no rebound or guarding, no peritoneal signs.   EXTREMITIES: ROM UE WNL, left arm with increasedSwelling, lymphedema to legs bilaterally, refer to therapy for lymph wraps.  SKIN: Warm and dry, no erythema noted, no rashes or lesions.  NEUROLOGICAL: The patient is oriented to person, place and time. Strength and sensation are grossly intact. Face is symmetric.          LABS:    Lab Results   Component Value Date    WBC 7.1 01/06/2020    HGB 9.8 (L) 01/06/2020    HCT 31.9 (L) 01/06/2020    MCV 82 01/06/2020     01/06/2020       Results for orders placed or performed in visit on 01/06/20   Basic Metabolic Panel   Result Value Ref Range    Sodium 139 136 - 145 mmol/L    Potassium 4.0 3.5 - 5.0 mmol/L    Chloride 101 98 - 107 mmol/L    CO2 31 22 - 31 mmol/L    Anion Gap, Calculation 7 5 - 18 mmol/L    Glucose 104 70 - 125 mg/dL    Calcium 9.2 8.5 - 10.5 mg/dL    BUN 24 (H) 8 - 22 mg/dL    Creatinine 0.65 (L) 0.70 - 1.30 mg/dL    GFR MDRD Af Amer >60 >60 mL/min/1.73m2    GFR MDRD Non Af Amer >60 >60 mL/min/1.73m2           Lab Results   Component Value Date    HGBA1C 5.3 12/11/2019     No results found for: [id number]  Lab Results   Component Value Date    NQYNFMVQ65 321 12/11/2019       ASSESSMENT/PLAN:  1. Acute diastolic CHF (congestive heart failure), NYHA class 3 (H)    2. Weight gain    3. Debilitated patient      1. Debilitated pt/has Lymphedema.: We will review therapy needs,  debilitated and wreaked but basically debilitated at baseline. Discharge planning omgoing, will need LTC. Pt continues with therapy. Continues to refuse to wear tubi's or other compression.    2. Wt up again, back from hospital at 199, today at 213. See's cardio how adjusted Bumex last week, we will increase to x1 extra dose, 1 mg po at boon today. He had a BMP done recently which was reviewed.    3. Debilitated pt, continues with therapy 3 x weekly. SW looking or LTC placement.    Electronically signed by:  Anuradha Barrett CNP  This progress note was completed using Dragon software and there may be grammatical errors.

## 2021-06-20 NOTE — LETTER
Letter by Anuradha Barrett CNP at      Author: Anuradha Barrett CNP Service: -- Author Type: --    Filed:  Encounter Date: 2020 Status: Signed         Patient: Rashad Caputo   MR Number: [id number]   YOB: 1950   Date of Visit: 2020     [facility]      NAME:  Rashad Caputo             :  1950  MRN: [id number]  CODE STATUS:  FULL CODE    VISIT TYPE: DISCHARGE SUMMARY  FACILYTY: [facility] [[id number]]                    PRIMARY CARE PROVIDER: Angella Louis MD    DISCHARGE DIAGNOSIS:      1. Acute diastolic CHF (congestive heart failure), NYHA class 3 (H)    2. Urinary retention    3. Debilitated patient         DISCHARGE MEDICATIONS:         Medication List          Accurate as of 2020  4:37 PM. If you have any questions, ask your nurse or doctor.            CONTINUE taking these medications    acetaminophen 500 MG tablet  Commonly known as:  TYLENOL  Take 1 tablet (500 mg total) by mouth every 4 (four) hours as needed for pain. Do not exceed 4000 mg in 24 hours.     aspirin 81 MG EC tablet  Take 1 tablet (81 mg total) by mouth daily.     bumetanide 1 MG tablet  Commonly known as:  BUMEX  Take 1 tablet (1 mg total) by mouth daily.     carvedilol 6.25 MG tablet  Commonly known as:  COREG  Take 1 tablet (6.25 mg total) by mouth 2 (two) times a day.     cyclobenzaprine 10 MG tablet  Commonly known as:  FLEXERIL  Take 1 tablet (10 mg total) by mouth every 12 (twelve) hours as needed for muscle spasms.     diclofenac sodium 1 % Gel  Commonly known as:  VOLTAREN     fluticasone propionate 50 mcg/actuation nasal spray  Commonly known as:  FLONASE  Apply 1 spray into each nostril 2 (two) times a day.     gabapentin 100 MG capsule  Commonly known as:  NEURONTIN     hydrocortisone 5 MG tablet  Commonly known as:  CORTEF  Take 1 tablet (5 mg total) by mouth 2 (two) times a day. Do not stop this medicine unless MD discontinues due to  "possible Adrenal Insufficiency Diagnosis     magnesium oxide 400 mg (241.3 mg magnesium) tablet  Commonly known as:  MAG-OX     polyethylene glycol 17 gram packet  Commonly known as:  MIRALAX  Take 1 packet (17 g total) by mouth daily.     sodium chloride 0.65 % nasal spray  Commonly known as:  OCEAN     tamsulosin 0.4 mg Cap  Commonly known as:  FLOMAX     thiamine 100 MG tablet  Take 1 tablet (100 mg total) by mouth daily.        STOP taking these medications    QUEtiapine 50 MG tablet  Commonly known as:  SEROquel     senna-docusate 8.6-50 mg tablet  Commonly known as:  PERICOLACE            HISTORY OF PRESENT ILLNESS: Rashad Caputo is a 69 y.o. male is being seen today for a face to face visit for dc to a LTC facility on 1/15/20. [facility] and had been to acute care a few times during this stay for UTI as well as CHF. Last acute care stay was at [facility] from 12/9 to 12/21 2019 . Per his EMR record he was \" presenting with hypothermia, sepsis secondary to probable catheter associated urinary tract infection, dysarthria, acute diastolic heart failure     Septic shock: Etiology presumed secondary to catheter associated urinary tract infection with urine culture MSSA.  Blood cultures x3 no growth to date.  Required ICU admission and initial vasopressor and broad-spectrum IV antibiotic support.  Current septic shock resolved.     Catheter associated UTI with sepsis: Urine culture MSSA.  Patient refused to have Valentine catheter exchanged for new one as he would like to follow-up as outpatient with his urologist.  I have placed an order for referral for [place] [facility] for him to be seen in the clinic within 7 days time to have catheter exchanged out for new one.  Counseled patient on the importance of exchanging catheter and he acknowledged understanding was able to recite potential complications of not following recommendations of exchanging catheter out for a clean 1.  He still refused. "  Patient will complete doxycycline as advised by infectious disease who have now signed off with last dose to be completed on 12/20/2019.     Hyperkalemia: Resolved.  Per nephrology likely multifactorial from elevated potassium intake, heparin, beta-blocker versus possible type IV RTA, versus possible adrenal insufficiency.  Recommend continued monitoring of potassium outside outpatient TCU.  Low potassium diet.  Nephrology has signed off.     Hypertension: Stable with blood pressure goal on carvedilol and Bumex.      Acute/chronic diastolic heart failure: Transthoracic echocardiogram 12/10/2019 EF 54%, normal right ventricular systolic function.  No significant valvular heart disease.  Patient treated with IV diuresis with a significant improvement and approximately 29 L of weight loss per last cardiology note on 12/16.  Patient refused stress testing.  Cardiology has signed off as no further recommendations.  He is to continue oral Bumex as prescribed at discharge.  Cardiac diet.  Recommend strict intake and output and daily weights at TCU.  If patient develops hypervolemia or weight gain such as more than 2 pounds in 24.  On the same scale would recommend TCU physician adjust dose of oral Bumex therapy.  Will place referral for CHF clinic follow-up as outpatient in discharge orders.       Possible adrenal insufficiency: ACTH level less than 10.  Cosyntropin stimulation test with predose cortisol level of 1.7, 30-minute post cosyntropin cortisol level 12.7 and 60-minute post cortisol level 15.5.  This would not suggest primary adrenal insufficiency and therefore I think would be unlikely to be a cause of his hyperkalemia.  However the low ACTH and cortisol levels could be possibly suggestive of some tertiary adrenal insufficiency.  He is on hydrocortisone 5 mg twice daily for now and an endocrinology referral has been placed for patient to be seen in the outpatient clinic.  His response to cosyntropin going from  1.7 up to 15.5 seems to be a reasonable response although did not get to above 20.  His albumin level was 3.2.     Anxiety/mood disorder: On Seroquel, gabapentin.  Evaluated by psychiatry.  Deemed stable from psychiatric standpoint.  Patient refused to be started on any medications for anxiety.  Also declined mental health resources as outpatient.  Recommend close PCP follow-up and consideration of outpatient psychotherapy/psychiatry involvement if patient consents.     Dysarthria: Developed several days prior to arrival to hospital.  Was not a TPA candidate.  MRI of the brain was negative for recent infarct, intracranial mass, abnormal enhancement or evidence of intracranial hemorrhage.  Mild cerebral volume loss and presumed chronic small vessel ischemic changes noted.  Evaluated by neurology.  On aspirin.  Statin.  Suspected fluctuations in blood pressure in the setting of chronic Afrin therapy causing transient perfusion abnormalities contributing to symptoms.  On regular diet per speech therapy/pathology evaluations and patient meeting goals.     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  This is discontinued.  Flonase implemented.  Would avoid use of Afrin in future and patient counseled on the importance of this.  I am going to discontinue this at this time completely.     Left arm swelling: Chronic.  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  Similar.  Surgery consultation obtained who did not feel there was any evidence of septic left shoulder arthritis and no acute findings with chronic left anterior shoulder dislocation.  No activity restrictions per the recommendations.  Bilateral upper and lower extremity edema compressions advised.   "Orthopedic surgery signed off.     Bilateral lower extremity edema: Venous duplex ultrasound negative for DVT.  Improved with diuresis.  Suspect secondary to diastolic heart failure.      Deconditioning: Patient currently Anshu lift.  Significant rehabilitation needed at this time.  TCU facility has accepted patient and given all consultants have signed off and patient otherwise stable no longer requiring active acute inpatient medical treatment stable for discharge today to transitional care unit.\"    He is now stable enough to dc to LTC setting.         SKILLED NURSING FACILITY COURSE:  During this TCU stay, patient completed all anticipated goals of therapy.      PHYSICAL EXAMINATION:    Vitals:    01/14/20 1617   BP: 124/73   Pulse: 94   Temp: 97  F (36.1  C)   Weight: 217 lb 6.4 oz (98.6 kg)         GENERAL: Awake, Alert, oriented x3, not in any form of acute distress, answers questions appropriately, follows simple commands, conversant  HEENT: Head is normocephalic with normal hair distribution. No evidence of trauma. Ears: No acute purulent discharge.Oral: Poor dentition, Eyes: Conjunctivae pink with no scleral jaundice. Nose: Normal mucosa and septum. NECK: Supple with no cervical or supraclavicular lymphadenopathy. Trachea is midline.   CHEST: No tenderness or deformity, no crepitus  LUNG: Clear to auscultation with good chest expansion. There are no crackles or wheezes, normal AP diameter.  BACK: No kyphosis of the thoracic spine. Symmetric, no curvature, ROM normal, no CVA tenderness, no spinal tenderness   CVS: There is good S1  S2, rhythm is regular.  ABDOMEN: Globular and soft, nontender to palpation, non distended, no masses, no organomegaly, good bowel sounds, no rebound or guarding, no peritoneal signs.   EXTREMITIES:   Bilateral Lymphedema to LE,  arthritic  joint swelling. HO shoulder pain with limited ROM  SKIN: Warm and dry, no erythema noted, no rashes or lesions.  NEUROLOGICAL: The patient is " oriented to person, place and time. Strength and sensation are grossly intact. Face is symmetric.      LABS:  All labs reviewed in the nursing home record.        DISCHARGE PLAN: I certify that this patient is under Dr. Le's care, seen by the NP, and had a face-to-face encounter that meets the physician face-to-face encounter requirements on 1/14/20 The encounter was in whole, or part related to the primary reason for home health.  The Patient is homebound due to: CHF and deconditioned state, and   it is  taxing and it will take a considerable amount of effort for patient to leave the home. He is dependent on others for transportation.  The patient is confined to his home and needs intermittent skilled nursing, PT,OT, RN, SW, and HHA.  The patient has been under the care of Dr. Le/NP and Dr. Le  initiated the establishment of the plan of care.        Patient to be followed by home care for physical therapy to eval and treat for strengthening, balance, endurance, and safety with mobility, and ambulation.  Patient to be followed by home care for occupational therapy to eval and treat for strengthening, ADL needs, adaptive equipment, and safety.  Patient to be followed by home care for nursing services for medication set up and teaching, symptom and disease processes monitoring and education.    Patient to be followed by home care for home health aid services for bathing and ADL needs.    Patient will follow up with PCP within 7- days after discharge for medication mangagment and appropriate lab studies.      Post Discharge Medication Reconciliation Status: discharge medications reconciled and changed, per note/orders (see AVS)        Electronically signed by:  Anuradha Barrett CNP  This progress note was completed using Dragon software and there may be grammatical errors.      For documentation purposes, chart review, medication management, and discharge coordination of care was greater than 35 minutes

## 2021-06-20 NOTE — LETTER
Letter by Manolo Lazo MD at      Author: Manolo Lazo MD Service: -- Author Type: --    Filed:  Encounter Date: 5/19/2020 Status: (Other)         Rashad Caputo  [id number] [facility] [address]      May 19, 2020      Dear Rashad,    This letter is to remind you that you will be due for your follow up appointment with Dr. Manolo Lazo in June, 2020 . To help ensure you are in the best health possible, a regular follow-up with your cardiologist is essential.     Please call our Patient Scheduling Line at [phone] to schedule your appointment at your earliest convenience.  If you have recently scheduled an appointment, please disregard this letter.    We look forward to seeing you again. As always, we are available at the number  above for any questions or concerns you may have.      Sincerely,     The Physicians and Staff of [facility]

## 2021-06-20 NOTE — LETTER
Letter by Juana Le MBBS at      Author: Juana Le [employer] Service: -- Author Type: --    Filed:  Encounter Date: 12/24/2019 Status: Signed         Patient: Rashad Caputo   MR Number: [id number]   YOB: 1950   Date of Visit: 12/24/2019       [facility] Admission note      Patient: Rashad Caputo  MRN: [id number]  Date of Service: 12/24/2019      [facility] [[id number]]  Reason for Visit     Chief Complaint   Patient presents with   ? H & P       Code Status     FULL CODE    Assessment     -Acute diastolic congestive heart failure with acute exacerbation  -Sepsis secondary to catheter associated UTI  -Hypothermia on admission  -MSSA UTI  -Anxiety with depression  -Bilateral lower extremity edema work-up negative for DVT  -Chronic upper extremity swelling with bilateral anterior dislocation of bilateral shoulders  -Severe osteoarthritis of the right knee  -Chronic low back pain with patient reporting he needs surgical repair  -Acute onset of dysarthria; MRI of the brain was negative  -Rhinitis medicamentosa taken off Afrin  -Underlying concern for severe anxiety with mood disorder patient refusing medication  - HTN  -Hyperkalemia  -Suspected adrenal insufficiency discharged on steroids  -Profound decline with debilitation now downgraded to a Anshu prior to discharge    Plan     Patient had a very prolonged and complex stay in the hospital.  He was evaluated by multiple specialists including cardiology for his CHF concerns, infectious disease because of ongoing issues with sepsis; psychiatry as well as orthopedics.  He unfortunately remained resistant to care he refused to start multiple medication.  He also refused further work-ups as recommended by the specialist   he refused catheter exchange.  This was recommended by both the hospitalist and urology for him.  He has refused cardiac work-up including stress test to be done in the hospital.  He also was evaluated for  anxiety but refused psychotropic medications.  Psychiatry has recommended that he be evaluated as an outpatient unfortunately this is an ongoing issue and he has refused any medication to assist him with anxiety  He was admitted with progressive weight gain and CHF exacerbation.  He was given IV diuretics with good response and has ended up losing close to 29 kg of weight  He has been discharged to the TCU on oral Bumex and monitoring of weights.  He has been referred for CHF outpatient follow-up but refused further work-up in the hospital including a stress test.  Echocardiogram did show an EF of 15 4% with no valvular disease.  In addition he also was recommended to go for a catheter exchange the setting of UTI with cultures growing MSSA.  He has refused compliance with that and understands that he will need to do this as an outpatient.  Due to hyperkalemia and renal insufficiency has been given steroids.  Outpatient work-up with endocrinology recommended for him not sure if he will keep that appointment.  He was noted to have chronic anterior dislocation of bilateral shoulders.  This is resulted in significant edema bilateral extremities  left greater than right.  He was evaluated by orthopedics.  Imaging did reveal full-thickness tearing of the supraspinatus infraspinatus and subscapularis tendons with rotator cuff muscle atrophy.  Septic work-up was negative with no evidence of septic shoulders.  Orthopedics has recommended continuation of activities with no restrictions.  No surgical intervention is planned.  He has bilateral lower extremity my work-up was negative for any DVT.  Unfortunately he has progressively gotten worse and now requires a Anshu lift for transfers.  I did discuss him getting knee surgeries versus having his shoulders fixed and he told me he had talked to his friends and has elected not to pursue any further work-up because they told him it would not be worth it  He had another episode of  Evelin Arnett(Resident) extreme anxiety last night when he got into a verbal altercation with staff.  He is reporting that it was not because of anxiety but mainly because of staff not doing what they were expected to do.  This is been an ongoing issue with him when he gets anxious and generally ends up in discord with multiple staff member especially worse in the evening.  The clinical psychologist seeing him had recommended psychotropic meds which she refuses.  Encouraged him to think about it again.  Discharge planning reviewed with him his wife is already in resident at another nursing home.  He has made up his mind he will not be discharging to that facility.  He is not sure what he wants to do and is hoping his daughter will find him appropriate placement at present he has no discharge planning  Total time spent is 45 minutes with more than 35 minutes spent face-to-face talking to the patient reviewing his care concerns including management of his multiple joints with osteoarthritis and severe disability.  Also reviewed was discharge planning and anxiety management and his behavioral dysregulation especially in the evening    History     Patient is a very pleasant 69 y.o. male who is REadmitted to TCU  He has been readmitted to the nursing home after a prolonged and complex stay in the hospital with multiple issues.  He was admitted in the hospital with septic shock associated with hypothermia.  This was felt to be secondary to catheter associated UTI.  Urine cultures did grow MSSA however blood cultures were negative.  He initially was admitted to the ICU and required vasopressor and broad-spectrum IV support.  Currently septic shock issues have resolved nicely.  Patient has an underlying history of chronic urinary retention.  While in the hospital he refused to have a catheter exchange done.  It has been recommended that he do an outpatient follow-up with his urologist to have catheter exchange done.  He has been discharged on oral  doxycycline as prescribed by infectious disease last dose was finished on 12/20/2019.  He had acute on chronic diastolic congestive heart failure his EF on echocardiogram was 50%.  He was treated with diuretics.  Eventually patient had a significant response he has been discharged on oral Bumex and a cardiac diet with recommendation to monitor ins and outs closely.  Unfortunately he has been resistant to care in the hospital refusing stress testing and further work-up.  So had work-up done for possible adrenal insufficiency.  He is currently given hydrocortisone 5 mg twice daily and recommended to follow-up with endocrinology.  Patient has significant anxiety with mood disorder initially refused any medications eventually psychiatry did see him he declined mental health resources they have recommended outpatient work-up        Past Medical History     Active Ambulatory (Non-Hospital) Problems    Diagnosis   ? Adjustment disorder with mixed anxiety and depressed mood   ? Acute diastolic CHF (congestive heart failure), NYHA class 3 (H)   ? Dysarthria   ? Hypothermia, initial encounter   ? Acute congestive heart failure, unspecified heart failure type (H)   ? Pain   ? Left arm swelling   ? Fluid overload   ? Lymphedema   ? Allergic rhinitis   ? H/O noncompliance with medical treatment, presenting hazards to health   ? Failure to thrive in adult   ? Cognitive impairment   ? Weight gain   ? Stool incontinence   ? Insomnia   ? Discharge planning issues   ? Debilitated patient   ? Urinary retention   ? Urinary obstruction   ? Urinary tract infection   ? Benign prostatic hyperplasia with lower urinary tract symptoms   ? Post-void dribbling   ? Rhinitis medicamentosa   ? Sepsis due to urinary tract infection (H)   ? Sepsis (H)   ? CAD (coronary artery disease)   ? Non-STEMI (non-ST elevated myocardial infarction) (H)   ? Tachycardia   ? Anxiety   ? Spinal stenosis   ? Essential hypertension, benign   ? Benign Prostatic  Hypertrophy   ? Myalgia And Myositis   ? Urinary Tract Infection   ? Feelings Of Urinary Urgency   ? Joint Pain, Localized In The Hip     Past Medical History:   Diagnosis Date   ? Acute combined systolic and diastolic CHF, NYHA class 1 (H) 12/10/2019   ? Arthritis    ? Benign prostatic hyperplasia with lower urinary tract symptoms 2/23/2017   ? BPH (benign prostatic hypertrophy)    ? Coronary artery disease    ? History of transfusion 1975   ? Hypertension    ? Pneumonia 1980   ? Post-void dribbling 2/23/2017   ? Retinal tear of right eye    ? Rhinitis medicamentosa 2/23/2017   ? Spinal stenosis    ? Spinal stenosis    ? UTI (urinary tract infection)        Past Social History     Reviewed, and he  reports that he quit smoking about 35 years ago. He smoked 0.00 packs per day. He has never used smokeless tobacco. He reports that he does not drink alcohol or use drugs.    Family History     Reviewed, and history of cataracts and coronary vascular disease in his father.  His mother had dementia grandmother had diabetes    Medication List   Post Discharge Medication Reconciliation Status: discharge medications reconciled, continue medications without change   Current Outpatient Medications on File Prior to Visit   Medication Sig Dispense Refill   ? acetaminophen (TYLENOL) 500 MG tablet Take 1 tablet (500 mg total) by mouth every 4 (four) hours as needed for pain. Do not exceed 4000 mg in 24 hours.  0   ? aspirin 81 MG EC tablet Take 1 tablet (81 mg total) by mouth daily.  0   ? bumetanide (BUMEX) 1 MG tablet Take 1 tablet (1 mg total) by mouth daily. 30 tablet 0   ? carvedilol (COREG) 6.25 MG tablet Take 1 tablet (6.25 mg total) by mouth 2 (two) times a day. 10 tablet 0   ? cyclobenzaprine (FLEXERIL) 10 MG tablet Take 1 tablet (10 mg total) by mouth every 12 (twelve) hours as needed for muscle spasms. 10 tablet 0   ? diclofenac sodium (VOLTAREN) 1 % Gel Apply topically every 8 (eight) hours as needed (pain). Apply to  arm/knee.     ? fluticasone propionate (FLONASE) 50 mcg/actuation nasal spray Apply 1 spray into each nostril 2 (two) times a day. 16 g 12   ? gabapentin (NEURONTIN) 100 MG capsule Take 200 mg by mouth 3 (three) times a day.     ? hydrocortisone (CORTEF) 5 MG tablet Take 1 tablet (5 mg total) by mouth 2 (two) times a day. Do not stop this medicine unless MD discontinues due to possible Adrenal Insufficiency Diagnosis 10 tablet 0   ? magnesium oxide (MAG-OX) 400 mg (241.3 mg magnesium) tablet Take 400 mg by mouth daily.     ? polyethylene glycol (MIRALAX) 17 gram packet Take 1 packet (17 g total) by mouth daily.  0   ? QUEtiapine (SEROQUEL) 50 MG tablet Take 50 mg by mouth at bedtime.     ? senna-docusate (PERICOLACE) 8.6-50 mg tablet Take 1 tablet by mouth 2 (two) times a day.  0   ? sodium chloride (OCEAN) 0.65 % nasal spray Apply 1 spray into each nostril every 4 (four) hours as needed for congestion. Use with phenylephrine spray.      ? tamsulosin (FLOMAX) 0.4 mg cap Take 0.4 mg by mouth Daily after breakfast.            ? thiamine 100 MG tablet Take 1 tablet (100 mg total) by mouth daily.  0     No current facility-administered medications on file prior to visit.        Allergies     Allergies   Allergen Reactions   ? Lisinopril Shortness Of Breath and Dizziness   ? Amoxicillin      Allergy is per [facility] records.   ? Fosinopril Sodium Other (See Comments) and Dizziness     Mouth numbness   ? Tramadol Other (See Comments)     sweating   ? Amoxicillin-Pot Clavulanate Hives and Rash   ? Cephalosporins Hives and Rash   ? Penicillins Rash       Review of Systems   A comprehensive review of 14 systems was done. Pertinent findings noted here and in history of present illness. All the rest negative.  Constitutional: Negative.  Negative for fever, chills, he has activity change, appetite change and fatigue.   HENT: Negative for congestion and facial swelling.    Eyes: Negative for photophobia,  redness and visual disturbance.   Respiratory: Negative for cough and chest tightness.    Cardiovascular: Negative for chest pain, palpitations and leg swelling.   Gastrointestinal: Negative for nausea, diarrhea, he has chronic constipation, no blood in stool and abdominal distention.   Genitourinary: Negative.  Has an indwelling Valentine catheter  Musculoskeletal: Negative.  Cannot ambulate any longer and requires a Anshu for transfer  Skin: Negative.    Neurological: Negative for dizziness, tremors, syncope, weakness, light-headedness and headaches.   Hematological: Does not bruise/bleed easily.   Psychiatric/Behavioral: Negative.  He is quite anxious      Physical Exam     Recent Vitals 12/23/2019   Height -   Weight 197 lbs   BSA (m2) 2.09 m2   BP 95/63   Pulse 69   Temp 96.9   Temp src -   SpO2 -   Some recent data might be hidden       Constitutional: Oriented to person, place, and time and appears well-developed.   HEENT:  Normocephalic and atraumatic.  Eyes: Conjunctivae and EOM are normal. Pupils are equal, round, and reactive to light. No discharge.  No scleral icterus. Nose normal. Mouth/Throat: Oropharynx is clear and moist. No oropharyngeal exudate.    NECK: Normal range of motion. Neck supple. No JVD present. No tracheal deviation present. No thyromegaly present.   CARDIOVASCULAR: Normal rate, regular rhythm and intact distal pulses.  Exam reveals no gallop and no friction rub.  Systolic murmur present.  PULMONARY: Effort normal and breath sounds normal. No respiratory distress.No Wheezing or rales.  ABDOMEN: Soft. Bowel sounds are normal. No distension and no mass.  There is no tenderness. There is no rebound and no guarding. No HSM.  MUSCULOSKELETAL: Normal range of motion.  2+ lower extremity edema and no tenderness. Mild kyphosis, no tenderness.  Severe osteoarthritis noted in his right knee with deformity noted.  He cannot straighten his right lower extremity and not bear weight on it.  He also has  chronic anterior dislocation of both shoulders  LYMPH NODES: Has no cervical, supraclavicular, axillary and groin adenopathy.   NEUROLOGICAL: Alert and oriented to person, place, and time. No cranial nerve deficit.  Normal muscle tone. Coordination normal.   GENITOURINARY: Deferred exam.  SKIN: Skin is warm and dry. No rash noted. No erythema. No pallor.   EXTREMITIES: No cyanosis, no clubbing, 2+ lower extremity edema.  Also has edema on his arms left greater than right. no Deformity.  PSYCHIATRIC: Normal mood, affect and behavior.      Lab Results     Recent Results (from the past 240 hour(s))   POCT Glucose    Collection Time: 12/14/19 11:56 AM   Result Value Ref Range    Glucose 189 (H) 70 - 139 mg/dL   Cosyntropin Stimulation(Pre-Dose)Cortiso    Collection Time: 12/14/19  3:00 PM   Result Value Ref Range    Cortisol, Pre-dose 1.7 ug/dL   Cosyntropin Stimulation(30 Min Post-Dose    Collection Time: 12/14/19  3:30 PM   Result Value Ref Range    Cortisol, 30 min Post-dose 12.7 ug/dL   Aldosterone, Serum    Collection Time: 12/14/19  4:02 PM   Result Value Ref Range    Aldosterone 11.7 ng/dL   Cosyntropin Stimulation(60 Min Post-Dose    Collection Time: 12/14/19  4:02 PM   Result Value Ref Range    Cortisol, 60 min Post-dose 15.5 ug/dL    Time of Cortrosyn Injection 1500    POCT Glucose    Collection Time: 12/14/19  5:59 PM   Result Value Ref Range    Glucose 170 (H) 70 - 139 mg/dL   POCT Glucose    Collection Time: 12/14/19  9:48 PM   Result Value Ref Range    Glucose 124 70 - 139 mg/dL   HM2(CBC w/o Differential)    Collection Time: 12/15/19  6:02 AM   Result Value Ref Range    WBC 13.4 (H) 4.0 - 11.0 thou/uL    RBC 3.90 (L) 4.40 - 6.20 mill/uL    Hemoglobin 10.0 (L) 14.0 - 18.0 g/dL    Hematocrit 32.0 (L) 40.0 - 54.0 %    MCV 82 80 - 100 fL    MCH 25.6 (L) 27.0 - 34.0 pg    MCHC 31.3 (L) 32.0 - 36.0 g/dL    RDW 15.8 (H) 11.0 - 14.5 %    Platelets 172 140 - 440 thou/uL    MPV 11.1 8.5 - 12.5 fL   Basic Metabolic  Panel    Collection Time: 12/15/19  6:02 AM   Result Value Ref Range    Sodium 139 136 - 145 mmol/L    Potassium 5.4 (H) 3.5 - 5.0 mmol/L    Chloride 97 (L) 98 - 107 mmol/L    CO2 34 (H) 22 - 31 mmol/L    Anion Gap, Calculation 8 5 - 18 mmol/L    Glucose 101 70 - 125 mg/dL    Calcium 9.1 8.5 - 10.5 mg/dL    BUN 66 (H) 8 - 22 mg/dL    Creatinine 1.10 0.70 - 1.30 mg/dL    GFR MDRD Af Amer >60 >60 mL/min/1.73m2    GFR MDRD Non Af Amer >60 >60 mL/min/1.73m2   POCT Glucose    Collection Time: 12/15/19  7:47 AM   Result Value Ref Range    Glucose 109 70 - 139 mg/dL   POCT Glucose    Collection Time: 12/15/19  1:05 PM   Result Value Ref Range    Glucose 104 70 - 139 mg/dL   POCT Glucose    Collection Time: 12/15/19  6:06 PM   Result Value Ref Range    Glucose 112 70 - 139 mg/dL   POCT Glucose    Collection Time: 12/15/19  9:35 PM   Result Value Ref Range    Glucose 112 70 - 139 mg/dL   HM2(CBC w/o Differential)    Collection Time: 12/16/19  6:32 AM   Result Value Ref Range    WBC 10.1 4.0 - 11.0 thou/uL    RBC 3.91 (L) 4.40 - 6.20 mill/uL    Hemoglobin 10.1 (L) 14.0 - 18.0 g/dL    Hematocrit 31.7 (L) 40.0 - 54.0 %    MCV 81 80 - 100 fL    MCH 25.8 (L) 27.0 - 34.0 pg    MCHC 31.9 (L) 32.0 - 36.0 g/dL    RDW 15.9 (H) 11.0 - 14.5 %    Platelets 190 140 - 440 thou/uL    MPV 11.3 8.5 - 12.5 fL   Basic Metabolic Panel    Collection Time: 12/16/19  6:32 AM   Result Value Ref Range    Sodium 138 136 - 145 mmol/L    Potassium 4.8 3.5 - 5.0 mmol/L    Chloride 99 98 - 107 mmol/L    CO2 31 22 - 31 mmol/L    Anion Gap, Calculation 8 5 - 18 mmol/L    Glucose 85 70 - 125 mg/dL    Calcium 8.9 8.5 - 10.5 mg/dL    BUN 64 (H) 8 - 22 mg/dL    Creatinine 0.88 0.70 - 1.30 mg/dL    GFR MDRD Af Amer >60 >60 mL/min/1.73m2    GFR MDRD Non Af Amer >60 >60 mL/min/1.73m2   POCT Glucose    Collection Time: 12/16/19  8:14 AM   Result Value Ref Range    Glucose 91 70 - 139 mg/dL   POCT Glucose    Collection Time: 12/16/19 12:28 PM   Result Value Ref  Range    Glucose 97 70 - 139 mg/dL   POCT Glucose    Collection Time: 12/16/19  5:10 PM   Result Value Ref Range    Glucose 99 70 - 139 mg/dL   POCT Glucose    Collection Time: 12/16/19  9:05 PM   Result Value Ref Range    Glucose 126 70 - 139 mg/dL   HM2(CBC w/o Differential)    Collection Time: 12/17/19  6:30 AM   Result Value Ref Range    WBC 11.6 (H) 4.0 - 11.0 thou/uL    RBC 3.98 (L) 4.40 - 6.20 mill/uL    Hemoglobin 10.4 (L) 14.0 - 18.0 g/dL    Hematocrit 32.3 (L) 40.0 - 54.0 %    MCV 81 80 - 100 fL    MCH 26.1 (L) 27.0 - 34.0 pg    MCHC 32.2 32.0 - 36.0 g/dL    RDW 15.9 (H) 11.0 - 14.5 %    Platelets 186 140 - 440 thou/uL    MPV 11.3 8.5 - 12.5 fL   Basic Metabolic Panel    Collection Time: 12/17/19  6:30 AM   Result Value Ref Range    Sodium 138 136 - 145 mmol/L    Potassium 4.3 3.5 - 5.0 mmol/L    Chloride 100 98 - 107 mmol/L    CO2 29 22 - 31 mmol/L    Anion Gap, Calculation 9 5 - 18 mmol/L    Glucose 85 70 - 125 mg/dL    Calcium 8.9 8.5 - 10.5 mg/dL    BUN 56 (H) 8 - 22 mg/dL    Creatinine 0.78 0.70 - 1.30 mg/dL    GFR MDRD Af Amer >60 >60 mL/min/1.73m2    GFR MDRD Non Af Amer >60 >60 mL/min/1.73m2   POCT Glucose    Collection Time: 12/17/19  8:45 AM   Result Value Ref Range    Glucose 94 70 - 139 mg/dL   POCT Glucose    Collection Time: 12/17/19 12:30 PM   Result Value Ref Range    Glucose 122 70 - 139 mg/dL   POCT Glucose    Collection Time: 12/17/19  5:31 PM   Result Value Ref Range    Glucose 94 70 - 139 mg/dL   POCT Glucose    Collection Time: 12/17/19  9:08 PM   Result Value Ref Range    Glucose 334 (H) 70 - 139 mg/dL   HM2(CBC w/o Differential)    Collection Time: 12/18/19  6:21 AM   Result Value Ref Range    WBC 10.5 4.0 - 11.0 thou/uL    RBC 3.70 (L) 4.40 - 6.20 mill/uL    Hemoglobin 9.6 (L) 14.0 - 18.0 g/dL    Hematocrit 30.6 (L) 40.0 - 54.0 %    MCV 83 80 - 100 fL    MCH 25.9 (L) 27.0 - 34.0 pg    MCHC 31.4 (L) 32.0 - 36.0 g/dL    RDW 15.9 (H) 11.0 - 14.5 %    Platelets 172 140 - 440 thou/uL     MPV 11.1 8.5 - 12.5 fL   Basic Metabolic Panel    Collection Time: 12/18/19  6:21 AM   Result Value Ref Range    Sodium 137 136 - 145 mmol/L    Potassium 4.1 3.5 - 5.0 mmol/L    Chloride 100 98 - 107 mmol/L    CO2 29 22 - 31 mmol/L    Anion Gap, Calculation 8 5 - 18 mmol/L    Glucose 87 70 - 125 mg/dL    Calcium 8.9 8.5 - 10.5 mg/dL    BUN 47 (H) 8 - 22 mg/dL    Creatinine 0.72 0.70 - 1.30 mg/dL    GFR MDRD Af Amer >60 >60 mL/min/1.73m2    GFR MDRD Non Af Amer >60 >60 mL/min/1.73m2   POCT Glucose    Collection Time: 12/18/19  8:53 AM   Result Value Ref Range    Glucose 93 70 - 139 mg/dL   POCT Glucose    Collection Time: 12/18/19 12:28 PM   Result Value Ref Range    Glucose 91 70 - 139 mg/dL   POCT Glucose    Collection Time: 12/18/19  5:09 PM   Result Value Ref Range    Glucose 92 70 - 139 mg/dL   POCT Glucose    Collection Time: 12/18/19  9:21 PM   Result Value Ref Range    Glucose 199 (H) 70 - 139 mg/dL   Basic Metabolic Panel    Collection Time: 12/19/19  6:21 AM   Result Value Ref Range    Sodium 135 (L) 136 - 145 mmol/L    Potassium 4.4 3.5 - 5.0 mmol/L    Chloride 100 98 - 107 mmol/L    CO2 29 22 - 31 mmol/L    Anion Gap, Calculation 6 5 - 18 mmol/L    Glucose 90 70 - 125 mg/dL    Calcium 8.7 8.5 - 10.5 mg/dL    BUN 42 (H) 8 - 22 mg/dL    Creatinine 0.76 0.70 - 1.30 mg/dL    GFR MDRD Af Amer >60 >60 mL/min/1.73m2    GFR MDRD Non Af Amer >60 >60 mL/min/1.73m2   POCT Glucose    Collection Time: 12/19/19  7:44 AM   Result Value Ref Range    Glucose 90 70 - 139 mg/dL   POCT Glucose    Collection Time: 12/19/19  1:12 PM   Result Value Ref Range    Glucose 104 70 - 139 mg/dL   POCT Glucose    Collection Time: 12/19/19  5:02 PM   Result Value Ref Range    Glucose 128 70 - 139 mg/dL   POCT Glucose    Collection Time: 12/19/19  8:54 PM   Result Value Ref Range    Glucose 156 (H) 70 - 139 mg/dL   Basic Metabolic Panel    Collection Time: 12/20/19  5:36 AM   Result Value Ref Range    Sodium 136 136 - 145 mmol/L     Potassium 4.4 3.5 - 5.0 mmol/L    Chloride 102 98 - 107 mmol/L    CO2 29 22 - 31 mmol/L    Anion Gap, Calculation 5 5 - 18 mmol/L    Glucose 98 70 - 125 mg/dL    Calcium 8.6 8.5 - 10.5 mg/dL    BUN 40 (H) 8 - 22 mg/dL    Creatinine 0.73 0.70 - 1.30 mg/dL    GFR MDRD Af Amer >60 >60 mL/min/1.73m2    GFR MDRD Non Af Amer >60 >60 mL/min/1.73m2   POCT Glucose    Collection Time: 12/20/19  9:09 AM   Result Value Ref Range    Glucose 109 70 - 139 mg/dL   POCT Glucose    Collection Time: 12/20/19 12:03 PM   Result Value Ref Range    Glucose 110 70 - 139 mg/dL   POCT Glucose    Collection Time: 12/20/19  4:51 PM   Result Value Ref Range    Glucose 100 70 - 139 mg/dL   POCT Glucose    Collection Time: 12/20/19  9:10 PM   Result Value Ref Range    Glucose 164 (H) 70 - 139 mg/dL   POCT Glucose    Collection Time: 12/21/19  8:41 AM   Result Value Ref Range    Glucose 94 70 - 139 mg/dL            Imaging Results     Ct Abdomen Pelvis Without Oral Without Iv Contrast    Result Date: 12/10/2019  EXAM: CT ABDOMEN PELVIS WO ORAL WO IV CONTRAST LOCATION: [facility] DATE/TIME: 12/10/2019 5:58 PM INDICATION: Abdominal distension and pain. Severe anasarca. COMPARISON: CTA chest 12/09/2019. CT abdomen and pelvis 08/27/2019. TECHNIQUE: CT scan of the abdomen and pelvis was performed without oral or IV contrast. Multiplanar reformats were obtained. Dose reduction techniques were used. CONTRAST: None. FINDINGS: LOWER CHEST: Moderate right and small left pleural effusions. Associated atelectasis of the lung bases, right more than left. Right PICC incompletely imaged. Coronary artery calcification. HEPATOBILIARY: Normal. PANCREAS: Normal. SPLEEN: Normal. ADRENAL GLANDS: Normal. KIDNEY/BLADDER: Urinary bladder decompressed by Valentine catheter. BOWEL: No dilatation or inflammation of large or small bowel. Appendix not well seen separately. No evidence for acute appendicitis. No free fluid or gas. LYMPH NODES: Normal. VASCULATURE:  Unremarkable. PELVIC ORGANS: Normal. OTHER: None. MUSCULOSKELETAL: Generalized body wall edema. Demineralized skeleton. Degenerative changes and mild convex right scoliosis of the lumbar spine.     1.  Moderate right and small left pleural effusions. Associated atelectasis of the lung bases, right more than left. 2.  Generalized body wall edema.    Xr Chest 1 View Portable    Result Date: 12/17/2019  EXAM: XR CHEST 1 VIEW PORTABLE LOCATION: [facility] DATE/TIME: 12/17/2019 2:41 PM INDICATION: cough COMPARISON: 12/10/2019     Right PICC tip projects over the mid SVC. Persistent moderate right hemidiaphragm elevation. Persistent but improved right basilar streaky densities could represent atelectasis or pneumonia. Heart size appears normal. Left lung appears clear.    Xr Chest 2 Views    Result Date: 12/3/2019  EXAM: XR CHEST 2 VIEWS LOCATION: [facility] DATE/TIME: 12/3/2019 3:25 PM INDICATION: sob COMPARISON: AP and lateral views of the chest 11/01/2019; CT of the chest 11/01/2019     The posterior lungs were clipped on the lateral view. The right atrial heart border is obscured. Otherwise normal and unchanged cardiac and mediastinal interfaces. Elevation of the right hemidiaphragm. A focal opacity is present in the basal right lung linear in character consistent with focal atelectasis. There was similar but less extensive atelectasis in this location on the comparison radiograph and CT extensive. No pleural fluid or pneumothorax. Asymmetric increased soft tissue around the left shoulder and question perisistant/recurrant anterior left shoulder dislocation.     Ct Head Without Contrast    Result Date: 12/9/2019  EXAM: CT HEAD WO CONTRAST LOCATION: [facility] DATE/TIME: 12/9/2019 6:18 PM INDICATION: Slurring words. Strokelike symptoms. COMPARISON: None. TECHNIQUE: Routine without IV contrast. Multiplanar reformats. Dose reduction techniques were used. FINDINGS: INTRACRANIAL CONTENTS: No  intracranial hemorrhage, extraaxial collection, or mass effect.  No CT evidence of acute infarct. Mild presumed chronic small vessel ischemic changes. Mild generalized volume loss. No hydrocephalus. Skull-based vascular calcifications. Normal brainstem and cerebellum. Gray matter/white matter differentiation normal at all levels and there are no early signs of acute infarct. VISUALIZED ORBITS/SINUSES/MASTOIDS: No intraorbital abnormality. No paranasal sinus mucosal disease. No middle ear or mastoid effusion. BONES/SOFT TISSUES: No acute abnormality.     No acute intracranial process.    Mr Brain With Without Contrast    Result Date: 12/11/2019  EXAM: MR BRAIN W WO CONTRAST LOCATION: [facility] DATE/TIME: 12/11/2019 1:16 AM INDICATION: Motor neuron disease, slurred speech COMPARISON: Head CT 12/09/2019 CONTRAST: Gadavist 10 mL TECHNIQUE: Routine multiplanar multisequence head MRI without and with intravenous contrast. FINDINGS: Many of the sequences are degraded by motion. INTRACRANIAL CONTENTS: No acute or subacute infarct. No mass, acute hemorrhage, or extra-axial fluid collections. Scattered nonspecific T2/FLAIR hyperintensities within the cerebral white matter most consistent with mild chronic microvascular ischemic change. Mild generalized cerebral atrophy. No hydrocephalus. Normal position of the cerebellar tonsils. No pathologic contrast enhancement. SELLA: No abnormality accounting for technique. OSSEOUS STRUCTURES/SOFT TISSUES: Normal marrow signal. The major intracranial vascular flow voids are maintained. ORBITS: No abnormality accounting for technique. SINUSES/MASTOIDS: No paranasal sinus mucosal disease. No middle ear or mastoid effusion.     1.  No recent infarct, intracranial mass, abnormal enhancement or evidence of intracranial hemorrhage. 2.  Mild cerebral volume loss and presumed chronic small vessel ischemic changes.     Us Abdomen Complete    Result Date: 12/11/2019  EXAM: US ABDOMEN  COMPLETE LOCATION: [facility] DATE/TIME: 12/11/2019 2:06 AM INDICATION: 70 y/o man with edema, unclear cause, check liver, kidneys and dopper vessels COMPARISON: 12/10/2019. TECHNIQUE: Complete abdominal ultrasound. FINDINGS: GALLBLADDER: No evidence cholelithiasis. Gallbladder wall thickness measures 3 mm. Negative Trevino sign. BILE DUCTS: Common duct not visualized due to bowel gas. No ductal dilatation demonstrated. LIVER: Limited visualization. No focal mass demonstrated. The liver measures 17.9 cm. RIGHT KIDNEY: Measures 10.3 x 4.5 x 5 cm. No hydronephrosis. LEFT KIDNEY: Not visualized due to bowel gas. SPLEEN: Not visualized due to bowel gas. PANCREAS: Not visualized due to bowel gas. AORTA: Distal aorta measures 2.2 cm. Proximal and mid aorta not visualized. IVC: Limited visualization. Difficult examination due to bowel gas.     1.  Difficult examination due to bowel gas. 2.  No evidence of cholelithiasis. 3.  No ductal dilatation demonstrated.    Xr Chest 1 View For Picc Placement Portable    Result Date: 12/10/2019  EXAM: XR CHEST 1 VIEW FOR PICC LINE PLACEMENT PORTABLE LOCATION: [facility] DATE/TIME: 12/10/2019 2:30 PM INDICATION: verify catheter placement COMPARISON: AP and lateral views of the chest 12/03/2019     The new right PICC terminates in the middle third of the SVC. The image was acquired with caudal beam angulation. Projectional increased opacity in the basal right lung particularly medially. The right atrial heart border remains largely obscured. No change in other cardiac or mediastinal borders. No accumulation of pleural fluid. No pneumothorax.    Cta Chest Pe Run    Result Date: 12/9/2019  EXAM: CTA CHEST PE RUN LOCATION: [facility] DATE/TIME: 12/9/2019 6:19 PM INDICATION: Shortness of breath sob COMPARISON: 11/01/2019 TECHNIQUE: CT angiogram chest during arterial phase injection IV contrast. 2D and 3D MIP reconstructions were performed by the CT technologist.  Dose reduction techniques were used. CONTRAST: 80ml omni 350 FINDINGS: ANGIOGRAM CHEST: Respiratory motion, patient body habitus with increased image noise, and diminished opacification all within it exam sensitivity moderately. No evidence for pulmonary embolism centrally. Nondiagnostic evaluation of the subsegmental level. Pulmonary arteries normal in caliber. Thoracic aorta normal in caliber. No aortic dissection or other acute abnormality. HEART: Cardiac chambers within normal limits. No pericardial effusion. LUNGS AND PLEURA: Marked elevation of the right hemidiaphragm. Near-complete right lower lobe atelectasis. Excessive respiratory motion. Diffuse interstitial edema. No pleural effusion or pneumothorax. MEDIASTINUM: No adenopathy or mass. LIMITED UPPER ABDOMEN: Colonic interposition. MUSCULOSKELETAL: Chronic left glenohumeral dislocation anteriorly with new prominent fragmentation, large chronic effusion.. Mild right glenohumeral subluxation anteriorly. No suspicious bone lesion. Please see recent MRI left shoulder from 11/22/2019.     1.  No evidence for central pulmonary embolism. Limited peripheral evaluation as described. 2.  Diffuse interstitial edema correlate for CHF. 3.  Marked elevation the right hemidiaphragm with partial right lower lobe atelectasis.    Us Venous Legs Bilateral    Result Date: 12/9/2019  EXAM: US VENOUS LEGS BILATERAL LOCATION: [facility] DATE/TIME: 12/9/2019 8:17 PM INDICATION: LOWER EXT EDEMA COMPARISON: None. TECHNIQUE: Venous Duplex ultrasound of bilateral lower extremities with and without compression, augmentation and duplex. Color flow and spectral Doppler with waveform analysis performed. FINDINGS: Exam includes the common femoral, femoral, popliteal veins as well as segmentally visualized deep calf veins and greater saphenous vein. RIGHT: No deep vein thrombosis. No superficial thrombophlebitis. No popliteal cyst. LEFT: No deep vein thrombosis. No superficial  thrombophlebitis. No popliteal cyst.     1.  No deep venous thrombosis in the bilateral lower extremities.    Us Abdomen Duplex Hepatic And Portal Vasculature Complete    Result Date: 12/11/2019  EXAM: US ABDOMEN HEPATIC AND PORTAL VASCULATURE COMPLETE LOCATION: [facility] DATE/TIME: 12/11/2019 2:10 AM INDICATION: Abdominal distension, edema, check vessels. COMPARISON: None. TECHNIQUE: Hepatic ultrasound. Color flow with spectral Doppler and waveform analysis performed.  FINDINGS: There is appropriate directional flow in the main portal vein and right portal vein. The left portal vein is obscured by bowel gas. There is appropriate directional flow in the middle and right hepatic veins. The left hepatic vein is obscured. The hepatic artery is patent with a velocity of 80 cm/s. IVC is widely patent without thrombus. The splenic vein is obscured by bowel gas.     Appropriate directional flow in the hepatic vasculature.     Us Venous Arm Left    Result Date: 12/10/2019  EXAM: US VENOUS ARM LEFT LOCATION: [facility] DATE/TIME: 12/10/2019 4:17 PM INDICATION: Left arm swelling COMPARISON: None. TECHNIQUE: Venous Duplex ultrasound of the left upper extremity with (when possible) and without compression, augmentation, and duplex. Color flow and spectral Doppler with waveform analysis performed. FINDINGS: Ultrasound includes evaluation of the internal jugular vein, innominate vein, subclavian vein, axillary vein, and brachial vein. The superficial cephalic and basilic veins were also evaluated where seen. LEFT: No deep venous thrombosis. No superficial thrombophlebitis. Diffuse subcutaneous edema. Complex nonspecific fluid collection, presumed hematoma given patient's dislocation/fracture, extending over at least 6.3 x 3.3 x 2.2 cm.     1.  No deep venous thrombosis in the left upper extremity.            NICKOLAS Alvarado

## 2021-06-20 NOTE — LETTER
"Letter by Anuradha Barrett CNP at      Author: Anuradha Barrett CNP Service: -- Author Type: --    Filed:  Encounter Date: 2020 Status: Signed         Patient: Rashad Caputo   MR Number: [id number]   YOB: 1950   Date of Visit: 2020       [facility]      NAME:  Rashad Caputo             :  1950    MRN: [id number]    CODE STATUS:  FULL CODE    FACILITY: [facility] [place] [[id number]]         CHIEF COMPLAIN/REASON FOR VISIT:  Chief Complaint   Patient presents with   ? Review Of Multiple Medical Conditions       HISTORY OF PRESENT ILLNESS: Rashad Caputo is a 69 y.o. male being seen for review of multiple medical conditions. He is seen up in , his primary mode of transporatation, after breakfast today. He was on the TCU and sent to acute care due to gradual wt increase of 60 pounds or so. We had sent him in several times for lymphedema, but he was always sent back. But on day of dc from TCU,he had a ADELINE and .He started out at [facility] then sent to [place], in from  to 2019. Per his dc summary from hospital \" presenting with hypothermia, sepsis secondary to probable catheter associated urinary tract infection, dysarthria, acute diastolic heart failure     Septic shock: Etiology presumed secondary to catheter associated urinary tract infection with urine culture MSSA.  Blood cultures x3 no growth to date.  Required ICU admission and initial vasopressor and broad-spectrum IV antibiotic support.  Current septic shock resolved.     Catheter associated UTI with sepsis: Urine culture MSSA.  Patient refused to have Valentine catheter exchanged for new one as he would like to follow-up as outpatient with his urologist.  I have placed an order for referral for [facility] for him to be seen in the clinic within 7 days time to have catheter exchanged out for new one.  Counseled patient on the importance of exchanging catheter and he acknowledged " "understanding was able to recite potential complications of not following recommendations of exchanging catheter out for a clean 1.  He still refused.  Patient will complete doxycycline as advised by infectious disease who have now signed off with last dose to be completed on 12/20/2019.\"  Wt at 210 today, continues with dietary non compliance but has CHF and on bumex.Reports good appetite and bowels active.    Allergies   Allergen Reactions   ? Lisinopril Shortness Of Breath and Dizziness   ? Amoxicillin      [facility]   ? Fosinopril Sodium Other (See Comments) and Dizziness     Mouth numbness   ? Tramadol Other (See Comments)     sweating   ? Amoxicillin-Pot Clavulanate Hives and Rash   ? Cephalosporins Hives and Rash   ? Penicillins Rash   :     Current Outpatient Medications   Medication Sig   ? acetaminophen (TYLENOL) 500 MG tablet Take 1 tablet (500 mg total) by mouth every 4 (four) hours as needed for pain. Do not exceed 4000 mg in 24 hours.   ? aspirin 81 MG EC tablet Take 1 tablet (81 mg total) by mouth daily.   ? bumetanide (BUMEX) 1 MG tablet Take 1 tablet (1 mg total) by mouth daily.   ? carvedilol (COREG) 6.25 MG tablet Take 1 tablet (6.25 mg total) by mouth 2 (two) times a day.   ? cyclobenzaprine (FLEXERIL) 10 MG tablet Take 1 tablet (10 mg total) by mouth every 12 (twelve) hours as needed for muscle spasms.   ? diclofenac sodium (VOLTAREN) 1 % Gel Apply topically every 8 (eight) hours as needed (pain). Apply to arm/knee.   ? fluticasone propionate (FLONASE) 50 mcg/actuation nasal spray Apply 1 spray into each nostril 2 (two) times a day.   ? gabapentin (NEURONTIN) 100 MG capsule Take 200 mg by mouth 3 (three) times a day.   ? hydrocortisone (CORTEF) 5 MG tablet Take 1 tablet (5 mg total) by mouth 2 (two) times a day. Do not stop this medicine unless MD discontinues due to possible Adrenal Insufficiency Diagnosis   ? magnesium oxide (MAG-OX) 400 mg (241.3 mg " magnesium) tablet Take 400 mg by mouth 2 (two) times a day.    ? polyethylene glycol (MIRALAX) 17 gram packet Take 1 packet (17 g total) by mouth daily.   ? QUEtiapine (SEROQUEL) 50 MG tablet Take 50 mg by mouth at bedtime.   ? senna-docusate (PERICOLACE) 8.6-50 mg tablet Take 1 tablet by mouth 2 (two) times a day.   ? sodium chloride (OCEAN) 0.65 % nasal spray Apply 1 spray into each nostril every 4 (four) hours as needed for congestion. Use with phenylephrine spray.    ? tamsulosin (FLOMAX) 0.4 mg cap Take 0.4 mg by mouth Daily after breakfast.          ? thiamine 100 MG tablet Take 1 tablet (100 mg total) by mouth daily.         REVIEW OF SYSTEMS:    Currently, no fever, chills, or rigors. Does not have any visual or hearing problems. Denies any chest pain, headaches, palpitations, lightheadedness, dizziness, shortness of breath, or cough. Appetite is good. Denies any GERD symptoms. Denies any difficulty with swallowing, nausea, or vomiting.  Denies any abdominal pain,  with loose stools d/t prune juice. Denies any urinary symptoms other then retention,. No insomnia. No active bleeding . No rash. Pain to left arm has been stable      PHYSICAL EXAMINATION:  Vitals:    01/13/20 1835   BP: 151/81   Pulse: 67   Temp: 97.4  F (36.3  C)   Weight: 210 lb (95.3 kg)         GENERAL: Awake, Alert, oriented x3,unkempt in appearance,  not in any form of acute distress, answers questions appropriately, follows simple commands, conversant  HEENT: Head is normocephalic with normal hair distribution. No evidence of trauma. Ears: No acute purulent discharge, bilateral wax observed.. Eyes: Conjunctivae pink with no scleral jaundice. Nose: Normal mucosa and septum. NECK: Supple with no cervical or supraclavicular lymphadenopathy. Trachea is midline.   CHEST: No tenderness or deformity, no crepitus  LUNG: Clear to auscultation with good chest expansion. There are no crackles or wheezes, normal AP diameter.  BACK: No kyphosis of the  thoracic spine. Symmetric, no curvature, ROM normal, no CVA tenderness, no spinal tenderness   CVS: There is good S1  S2,  rhythm is regular.  ABDOMEN: Globular and soft, nontender to palpation, non distended, no masses, no organomegaly, good bowel sounds, no rebound or guarding, no peritoneal signs.   EXTREMITIES: ROM UE WNL, left arm with increasedSwelling, lymphedema to legs bilaterally, refer to therapy for lymph wraps.  SKIN: Warm and dry, no erythema noted, no rashes or lesions.  NEUROLOGICAL: The patient is oriented to person, place and time. Strength and sensation are grossly intact. Face is symmetric.          LABS:    Lab Results   Component Value Date    WBC 7.1 01/06/2020    HGB 9.8 (L) 01/06/2020    HCT 31.9 (L) 01/06/2020    MCV 82 01/06/2020     01/06/2020       Results for orders placed or performed in visit on 01/06/20   Basic Metabolic Panel   Result Value Ref Range    Sodium 139 136 - 145 mmol/L    Potassium 4.0 3.5 - 5.0 mmol/L    Chloride 101 98 - 107 mmol/L    CO2 31 22 - 31 mmol/L    Anion Gap, Calculation 7 5 - 18 mmol/L    Glucose 104 70 - 125 mg/dL    Calcium 9.2 8.5 - 10.5 mg/dL    BUN 24 (H) 8 - 22 mg/dL    Creatinine 0.65 (L) 0.70 - 1.30 mg/dL    GFR MDRD Af Amer >60 >60 mL/min/1.73m2    GFR MDRD Non Af Amer >60 >60 mL/min/1.73m2           Lab Results   Component Value Date    HGBA1C 5.3 12/11/2019     No results found for: [id number]  Lab Results   Component Value Date    VHTDAPBD38 321 12/11/2019       ASSESSMENT/PLAN:  1. Debilitated patient    2. Urinary retention      1. Debilitated pt/has Lymphedema.: We will review therapy needs, debilitated and wreaked but basically debilitated at baseline. Discharge planning omgoing, will need LTC possibly AL placement. SW continues to assist with DC planning    2. Urinary retention: Valentine in place, has not had voiding trial, followed by .    Electronically signed by:  Anuradha Barrett CNP  This progress note was completed using Dragon  software and there may be grammatical errors.

## 2021-06-20 NOTE — LETTER
Letter by Sofiya Fontanez CNP at      Author: Sofiya Fontanez CNP Service: -- Author Type: --    Filed:  Encounter Date: 12/23/2019 Status: Signed         Rashad Caputo  [address]  [place] [address]      December 23, 2019      Dear Rashad,    We have attempted to contact you to schedule your two week Heart Failure follow-up appointment with one of the providers in the Heart Failure Clinic, per discharge instructions for your recent hospitalization.     Please call our Patient Scheduling Line at [phone] to schedule your appointment.    Your health and follow-up medical care are important to us.  Please call our office as soon as possible so that we may schedule your appointment.  If you have already scheduled your appointment, please disregard this letter.      Sincerely,     The Providers and Staff of [facility]

## 2021-06-22 NOTE — TELEPHONE ENCOUNTER
Left message to call back for: patient.  Information to relay to patient:  Pt mailbox full, unable to leave a message. If pt calls back please transfer to huddle.

## 2021-06-22 NOTE — TELEPHONE ENCOUNTER
Pt notified why his appointment was cancelled and states understanding. Pt states that he will be coming to his appointment tomorrow.

## 2021-06-22 NOTE — TELEPHONE ENCOUNTER
"Upcoming Appointment Question  When is the appointment: Tomorrow, 1/4 at 1:30 PM, patient is scheduled back to back appointment with spouse who is seeing Dr. Louis at 1:00 PM  What is your appointment for?: follow up UTI  Who is your appointment scheduled with?: PCP only  What is your question/concern?: Patient called to reschedule his appointment that was scheduled for today, 1/3 at 1:30 PM. Patient called to reschedule his appointment but it looks like it was cancelled without patient knowing. Reasoning is listed as \"other\". Patient does not understand why this appointment was cancelled. Patient states he had an appointment cancelled on him that was scheduled for 12/31 as well. Please advise on these cancellations. Patient will see PCP tomorrow at 1:30 PM.   Okay to leave a detailed message?: Yes    "

## 2021-06-22 NOTE — TELEPHONE ENCOUNTER
Called pt in regards to appointment missed today. We spoke yesterday about cancelling and not showing up for appointments. He told me he would be here today but he did not come and states he forgot to call and cancel. He tells me his wife who also had an appointment is sleeping and that the  had to come to his home. He tells me he will call sometime next week for an appointment.

## 2021-06-22 NOTE — PROGRESS NOTES
ASSESSMENT/PLAN:       1. Foul smelling urine    - Urinalysis Macroscopic, moderate blood and white blood cells with protein  - Culture, Urine  - sulfamethoxazole-trimethoprim (SEPTRA DS) 800-160 mg per tablet; Take 1 tablet by mouth 2 (two) times a day for 10 days.  Dispense: 20 tablet; Refill: 0    2. Acute cystitis with hematuria    - Culture, Urine  - sulfamethoxazole-trimethoprim (SEPTRA DS) 800-160 mg per tablet; Take 1 tablet by mouth 2 (two) times a day for 10 days.  Dispense: 20 tablet; Refill: 0  Stressed the importance of increased fluid intake and he will be called with the results of his urine culture if the antibiotic needs to be changed.  Encouraged him to follow-up with [facility].    3. Hypertension  I suggested that he get back on his losartan as it is quite evident today that his blood pressure is running high and he needs to take that medicine.  He also should continue with the metoprolol          Jaylen Kathleen MD      PROGRESS NOTE   12/12/2018    SUBJECTIVE:  Rashad Caputo is a 68 y.o. male  who presents for   Chief Complaint   Patient presents with     Urinary Tract Infection     x a few days, foul smelling urine, urgency to go, no fever      #1 symptoms of urinary tract infection  Over the last 2 days the patient has noticed some urinary urgency frequency foul-smelling urine cage of urine.  Note that he has had recurrent urinary tract infections and in October of this year had a positive culture for strep viridans and he was treated with Bactrim double strength for 2 weeks.  He is not noticed any fever or back pain with this incident.  In the past he has had sepsis related to a UTI.  The patient is followed by [facility] and they are wanting to do a cystoscopy but they need to make sure he is not dealing with an infection before they can do that procedure.    2.  Hypertension  The patient has not been taking his losartan because he felt his blood pressure was too low but he has been  taking his metoprolol and I note that his blood pressure is quite high today    Patient Active Problem List   Diagnosis     Spinal Stenosis     Hypertension     Benign Prostatic Hypertrophy     Myalgia And Myositis     Urinary Tract Infection     Feelings Of Urinary Urgency     Joint Pain, Localized In The Hip     Tachycardia     Anxiety     CAD (coronary artery disease)     Non-STEMI (non-ST elevated myocardial infarction) (H)     CAP (community acquired pneumonia)     Sepsis due to urinary tract infection (H)     Sepsis (H)     Urinary tract infection     Benign prostatic hyperplasia with lower urinary tract symptoms     Post-void dribbling     Rhinitis medicamentosa       Current Outpatient Medications   Medication Sig Dispense Refill     metoprolol succinate (TOPROL-XL) 100 MG 24 hr tablet Take 1 tablet (100 mg total) by mouth daily. 90 tablet 3     phenylephrine (NICOLE-SYNEPHRINE) 1 % Spry 1 spray into each nostril.       sodium chloride (OCEAN) 0.65 % nasal spray 1 spray into each nostril as needed for congestion. Use with phenylephrine spray.       alfuzosin (UROXATRAL) 10 mg 24 hr tablet Take 1 tablet (10 mg total) by mouth daily. (Patient taking differently: Take 10 mg by mouth at bedtime. ) 90 tablet 0     fluticasone (FLONASE) 50 mcg/actuation nasal spray 1 puff each nostril 3 times a day for overuse of nasal decongestant (Patient taking differently: 1 spray into each nostril 3 (three) times a day. 1 puff each nostril 3 times a day for overuse of nasal decongestant) 16 g 12     losartan (COZAAR) 100 MG tablet Take 1 tablet (100 mg total) by mouth daily. 30 tablet 0     sulfamethoxazole-trimethoprim (SEPTRA DS) 800-160 mg per tablet Take 1 tablet by mouth 2 (two) times a day for 10 days. 20 tablet 0     No current facility-administered medications for this visit.        Social History     Tobacco Use   Smoking Status Former Smoker     Last attempt to quit:      Years since quittin.9   Smokeless  Tobacco Never Used     Review of systems:  Appetite is okay  No problem with bowel movement  Denies fever  No rash      OBJECTIVE:        Recent Results (from the past 240 hour(s))   Urinalysis Macroscopic   Result Value Ref Range    Color, UA Yellow Colorless, Yellow, Straw, Light Yellow    Clarity, UA Cloudy (!) Clear    Glucose, UA Negative Negative    Bilirubin, UA Negative Negative    Ketones, UA Trace (!) Negative    Specific Gravity, UA 1.025 1.005 - 1.030    Blood, UA Moderate (!) Negative    pH, UA 7.5 5.0 - 8.0    Protein,  mg/dL (!) Negative mg/dL    Urobilinogen, UA 1.0 E.U./dL 0.2 E.U./dL, 1.0 E.U./dL    Nitrite, UA Negative Negative    Leukocytes, UA Moderate (!) Negative       Vitals:    12/12/18 1202 12/12/18 1205   BP: (!) 160/110 (!) 168/110   Patient Position: Sitting Sitting   Cuff Size: Adult Large Adult Large   Pulse: 93    SpO2: 98%      No Data Recorded        Physical Exam:  GENERAL APPEARANCE: 68-year-old male who appears older than his stated age, NAD, well hydrated, well nourished  SKIN:  Normal skin turgor, no lesions/rashes   HEENT: moist mucous membranes, no rhinorrhea  NECK: Normal without adenopathy or masses  CV: RRR, no M/G/R   LUNGS: CTAB  ABDOMEN: S&NT, no masses or enlarged organs   EXTREMITY: no edema and full ROM of all joints  NEURO: no focal findings

## 2021-06-23 NOTE — TELEPHONE ENCOUNTER
Pt needs to be seen per Dr Gilbert.    Pt advised.    Dominique Fortune, RN  [facility] Medication Refill and Triage Nurse  2/9/2019  1:13 PM

## 2021-06-23 NOTE — TELEPHONE ENCOUNTER
Pt states that he has a UTI    Has urgency    Started 2-3 days ago.    No blood in the urine.    Doesn't have a car but does have a friend that can go and get the medication.    Foul smell to the urine.    Pt is handicap and has a hard time leaving the house that is why is he is requesting the medication.     Not currently on an ABX    Slight burning with urination at times not all the time    Is able to urinate    No swelling    No nausea     No vomiting    No back pain    Requesting Bactrim DS    Provider paged    Dominique Fortune RN  [facility]ication Refill and Triage Nurse  2/9/2019  1:03 PM      Reason for Disposition    All other males with painful urination    Protocols used: URINATION PAIN - MALE-A-

## 2021-06-24 NOTE — TELEPHONE ENCOUNTER
Left a message for patient to follow up on MD message.     Lorena Morrow RN [facility] Triage/Med Refill 3/8/2019 4:43 PM

## 2021-06-24 NOTE — TELEPHONE ENCOUNTER
Patient has missed multiple appointments this week and the last two times that he had urinary tract symptoms his urine culture was negative. Thus if he is too weak to come into the clinic he needs to go to the ED. I will see him yet this afternoon if by 4:30 in the clinic if he can get here.    Dr. Kathleen

## 2021-06-24 NOTE — TELEPHONE ENCOUNTER
"Triage call:   Patient is calling to report that he was too weak to get down the stairs to get out of his home this after noon to get to his clinic visit.     Patient was initially seen in the ER about a month ago, told to be seen about a right heel/foot lesion that looks pretty big. Patient reports that he is handicapped and has been home bound. Patient reports that the wound is red and black and has \"extra skin\" around the wound, \"it looks like a boil split open and drained\". Patient reports that the wound does not appear to be infected to him. The wound itself is approximately the size of a golf ball. Reports that when he was trying to get down the stairs for his appointment today he felt like he was going to fall and his legs felt as if they would give out. patient indicates that he does not have redness in his legs but indicates that he does have swelling in both feet, worse at the end of the day.     Patient reports that he is also having symptoms of a UTI, which he indicates are recurrent issues for him. Patient is requesting Dr Kathleen to treat the UTI as well.     Triaged to be seen in the ER but patient declines at this time. Would like Dr Kathleen's advice at this time. Please advise.      Patient is requesting to reschedule an appointment for Monday. Advised him that he may need to be seen sooner.     *Ok to leave a detailed message upon call back    Lorena Morrow RN [facility] Triage/Med Refill 3/8/2019 2:50 PM    Reason for Disposition    Wound causes weakness (i.e., decreased ability to move hand, finger, toe)    Protocols used: SKIN INJURY-A-OH      "

## 2021-06-24 NOTE — TELEPHONE ENCOUNTER
Left message to call back for: patient  Information to relay to patient:  See below    Ok to transfer to [facility] huddle if he has further questions or concerns. H.DeGree, CMA

## 2021-06-28 NOTE — PROGRESS NOTES
Progress Notes by Sofiya Fontanez CNP at 1/2/2020  9:10 AM     Author: Sofiya Fontanez CNP Service: -- Author Type: Nurse Practitioner    Filed: 1/2/2020 11:13 AM Encounter Date: 1/2/2020 Status: Signed    : Sofiya Fontanez CNP (Nurse Practitioner)             Assessment/Recommendations   Assessment:    1.  Heart failure with preserved ejection fraction, NYHA class II: Decompensated.  Based on documentation from his facility he is up approximately 10 pounds in the last week and a half.  He states his lower extremity edema has slightly worsened.  We discussed monitoring symptoms, following a low-sodium diet, monitoring daily weights, and heart failure treatment.  He is on a low-sodium diet at his facility.    2.  Hypertension: Controlled.  Blood pressure 108/70    Plan:  1.  Increase Bumex to 2 mg daily for 3 days then continue 1 mg daily thereafter.  2.  Continue daily weights and low-sodium diet    Rashad Caputo will follow up with Dr. Lazo in 6 to 8 weeks and in the heart failure clinic in April.     History of Present Illness/Subjective    Rashad Caputo is seen at [facility] heart failure clinic today for post-hospitalization follow-up.  He was hospitalized from December 9 to December 18, 2019 due to sepsis and acute diastolic heart failure.  He came from a nursing facility and had hypothermia.  His sepsis was secondary to probable catheter associated UTI.  He was started on doxycycline.  He also received IV diuresis and approximately 29 L was removed.  Cardiology recommended stress testing but he refused multiple times.  The most recent evaluation of His ejection fraction was 54% from an  echo on 12/10/2019.  His past medical history is also significant for hypertension, coronary artery disease and dyslipidemia.      Since being discharged from the hospital, Rashad feels that he is improving.  He has fatigue and mild lower extremity edema.  He denies dyspnea on exertion,  orthopnea, or PND.  He denies chest pain.  He denies lightheadedness, shortness of breath, dyspnea on exertion, orthopnea, PND, palpitations, chest pain and abdominal fullness/bloating.      His facility is monitoring his weights daily.  His weight documented today is up approximately 10 pounds.  He is on a low-sodium diet at his facility.      ECHOCARDIOGRAM: 12/10/2019  Summary       Left ventricle ejection fraction is mildly decreased. The calculated left ventricular ejection fraction is 54% without definite wall motion abnormality.    Normal right ventricular systolic function    No significant valvular heart disease.    When compared to the previous study dated 10/19/2014, left ventricular systolic function has declined mildly.             Physical Examination Review of Systems   Vitals:    01/02/20 0913   BP: 108/70   Pulse: 84   Resp: 16     Body mass index is 30.72 kg/m .  Wt Readings from Last 3 Encounters:   01/02/20 208 lb (94.3 kg)   12/30/19 199 lb 12.8 oz (90.6 kg)   12/23/19 197 lb (89.4 kg)       General Appearance:   no distress   ENT/Mouth: membranes moist, no oral lesions or bleeding gums.      EYES:  no scleral icterus, normal conjunctivae   Neck: no carotid bruits or thyromegaly   Chest/Lungs:   lungs are clear to auscultation, no rales or wheezing, equal chest wall expansion    Cardiovascular:   Regular. Normal first and second heart sounds with no murmurs, rubs, or gallops; Jugular venous pressure normal, mild edema bilaterally    Abdomen:  no organomegaly, masses, bruits, or tenderness; bowel sounds are present   Extremities: no cyanosis or clubbing   Skin: no xanthelasma, warm.    Neurologic: normal  bilateral, no tremors     Psychiatric: alert and oriented x3    General: Weight Gain  Eyes: WNL  Ears/Nose/Throat: WNL  Lungs: WNL  Heart: Leg Swelling  Stomach: Constipation  Bladder: WNL  Muscle/Joints: Joint Pain  Skin: WNL  Nervous System: Falls  Mental Health: Depression     Blood: WNL      Medical History  Surgical History Family History Social History   Past Medical History:   Diagnosis Date   ? Acute combined systolic and diastolic CHF, NYHA class 1 (H) 12/10/2019   ? Arthritis    ? Benign prostatic hyperplasia with lower urinary tract symptoms 2017   ? BPH (benign prostatic hypertrophy)    ? Coronary artery disease    ? History of transfusion    ? Hypertension    ? Pneumonia     had scar tissue, double pneumonia   ? Post-void dribbling 2017   ? Retinal tear of right eye    ? Rhinitis medicamentosa 2017   ? Spinal stenosis    ? Spinal stenosis    ? UTI (urinary tract infection)     Past Surgical History:   Procedure Laterality Date   ? BACK SURGERY     ? TONSILLECTOMY     ? URETHROPLASTY      History reviewed. No pertinent family history. Social History     Socioeconomic History   ? Marital status:      Spouse name: Not on file   ? Number of children: Not on file   ? Years of education: Not on file   ? Highest education level: Not on file   Occupational History   ? Not on file   Social Needs   ? Financial resource strain: Not on file   ? Food insecurity:     Worry: Not on file     Inability: Not on file   ? Transportation needs:     Medical: Not on file     Non-medical: Not on file   Tobacco Use   ? Smoking status: Former Smoker     Packs/day: 0.00     Last attempt to quit: 1985     Years since quittin.0   ? Smokeless tobacco: Never Used   Substance and Sexual Activity   ? Alcohol use: No   ? 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   ? Sexual activity: Not on file   Lifestyle   ? Physical activity:     Days per week: Not on file     Minutes per session: Not on file   ? Stress: Not on file   Relationships   ? Social connections:     Talks on phone: Not on file     Gets together: Not on file     Attends L[employer] day service: Not on file     Active member of club or organization: Not on file     Attends meetings of clubs or organizations: Not on file      Relationship status: Not on file   ? Intimate partner violence:     Fear of current or ex partner: Not on file     Emotionally abused: Not on file     Physically abused: Not on file     Forced sexual activity: Not on file   Other Topics Concern   ? Not on file   Social History Narrative    Lives with family.           Medications  Allergies   Current Outpatient Medications   Medication Sig Dispense Refill   ? acetaminophen (TYLENOL) 500 MG tablet Take 1 tablet (500 mg total) by mouth every 4 (four) hours as needed for pain. Do not exceed 4000 mg in 24 hours.  0   ? bumetanide (BUMEX) 1 MG tablet Take 1 tablet (1 mg total) by mouth daily. 30 tablet 0   ? cyclobenzaprine (FLEXERIL) 10 MG tablet Take 1 tablet (10 mg total) by mouth every 12 (twelve) hours as needed for muscle spasms. 10 tablet 0   ? fluticasone propionate (FLONASE) 50 mcg/actuation nasal spray Apply 1 spray into each nostril 2 (two) times a day. 16 g 12   ? gabapentin (NEURONTIN) 100 MG capsule Take 200 mg by mouth 3 (three) times a day.     ? hydrocortisone (CORTEF) 5 MG tablet Take 1 tablet (5 mg total) by mouth 2 (two) times a day. Do not stop this medicine unless MD discontinues due to possible Adrenal Insufficiency Diagnosis 10 tablet 0   ? magnesium oxide (MAG-OX) 400 mg (241.3 mg magnesium) tablet Take 400 mg by mouth 2 (two) times a day.      ? tamsulosin (FLOMAX) 0.4 mg cap Take 0.4 mg by mouth Daily after breakfast.            ? aspirin 81 MG EC tablet Take 1 tablet (81 mg total) by mouth daily.  0   ? carvedilol (COREG) 6.25 MG tablet Take 1 tablet (6.25 mg total) by mouth 2 (two) times a day. 10 tablet 0   ? diclofenac sodium (VOLTAREN) 1 % Gel Apply topically every 8 (eight) hours as needed (pain). Apply to arm/knee.     ? polyethylene glycol (MIRALAX) 17 gram packet Take 1 packet (17 g total) by mouth daily.  0   ? QUEtiapine (SEROQUEL) 50 MG tablet Take 50 mg by mouth at bedtime.     ? senna-docusate (PERICOLACE) 8.6-50 mg tablet  Take 1 tablet by mouth 2 (two) times a day.  0   ? sodium chloride (OCEAN) 0.65 % nasal spray Apply 1 spray into each nostril every 4 (four) hours as needed for congestion. Use with phenylephrine spray.      ? thiamine 100 MG tablet Take 1 tablet (100 mg total) by mouth daily.  0     No current facility-administered medications for this visit.     Allergies   Allergen Reactions   ? Lisinopril Shortness Of Breath and Dizziness   ? Amoxicillin      [facility]   ? Fosinopril Sodium Other (See Comments) and Dizziness     Mouth numbness   ? Tramadol Other (See Comments)     sweating   ? Amoxicillin-Pot Clavulanate Hives and Rash   ? Cephalosporins Hives and Rash   ? Penicillins Rash         Lab Results    Chemistry/lipid CBC Cardiac Enzymes/BNP/TSH/INR   Lab Results   Component Value Date    CHOL 129 12/10/2019    HDL 46 12/10/2019    LDLCALC 70 12/10/2019    TRIG 64 12/10/2019    CREATININE 0.60 (L) 12/30/2019    BUN 21 12/30/2019    K 4.0 12/30/2019     12/30/2019     12/30/2019    CO2 30 12/30/2019    Lab Results   Component Value Date    WBC 7.8 12/30/2019    HGB 9.7 (L) 12/30/2019    HCT 31.2 (L) 12/30/2019    MCV 83 12/30/2019     12/30/2019    Lab Results   Component Value Date    CKTOTAL 15 (L) 12/14/2019    CKMB 1 10/17/2014    TROPONINI <0.01 12/10/2019    BNP 78 (H) 12/09/2019    TSH 6.41 (H) 12/09/2019    INR 1.07 12/03/2019           This note has been dictated using voice recognition software. Any grammatical, typographical, or context distortions are unintentional and inherent to the software

## 2021-06-28 NOTE — PROGRESS NOTES
Progress Notes by Anuradha Barrett CNP at 2020  8:30 AM     Author: Anuradha Barrett CNP Service: -- Author Type: Nurse Practitioner    Filed: 2020  4:48 PM Encounter Date: 2020 Status: Attested    : Anuradha Brarett CNP (Nurse Practitioner) Cosigner: Juana Le MBBS at 1/15/2020  2:46 PM    Attestation signed by Juana Le MBBS at 1/15/2020  2:46 PM    Agree with discharge plan                [facility]      NAME:  Rashad Caputo             :  1950  MRN: [id number]  CODE STATUS:  FULL CODE    VISIT TYPE: DISCHARGE SUMMARY  FACILYTY: [facility] [[id number]]                    PRIMARY CARE PROVIDER: Angella Louis MD    DISCHARGE DIAGNOSIS:      1. Acute diastolic CHF (congestive heart failure), NYHA class 3 (H)    2. Urinary retention    3. Debilitated patient         DISCHARGE MEDICATIONS:         Medication List          Accurate as of 2020  4:37 PM. If you have any questions, ask your nurse or doctor.            CONTINUE taking these medications    acetaminophen 500 MG tablet  Commonly known as:  TYLENOL  Take 1 tablet (500 mg total) by mouth every 4 (four) hours as needed for pain. Do not exceed 4000 mg in 24 hours.     aspirin 81 MG EC tablet  Take 1 tablet (81 mg total) by mouth daily.     bumetanide 1 MG tablet  Commonly known as:  BUMEX  Take 1 tablet (1 mg total) by mouth daily.     carvedilol 6.25 MG tablet  Commonly known as:  COREG  Take 1 tablet (6.25 mg total) by mouth 2 (two) times a day.     cyclobenzaprine 10 MG tablet  Commonly known as:  FLEXERIL  Take 1 tablet (10 mg total) by mouth every 12 (twelve) hours as needed for muscle spasms.     diclofenac sodium 1 % Gel  Commonly known as:  VOLTAREN     fluticasone propionate 50 mcg/actuation nasal spray  Commonly known as:  FLONASE  Apply 1 spray into each nostril 2 (two) times a day.     gabapentin 100 MG capsule  Commonly known as:  NEURONTIN    "  hydrocortisone 5 MG tablet  Commonly known as:  CORTEF  Take 1 tablet (5 mg total) by mouth 2 (two) times a day. Do not stop this medicine unless MD discontinues due to possible Adrenal Insufficiency Diagnosis     magnesium oxide 400 mg (241.3 mg magnesium) tablet  Commonly known as:  MAG-OX     polyethylene glycol 17 gram packet  Commonly known as:  MIRALAX  Take 1 packet (17 g total) by mouth daily.     sodium chloride 0.65 % nasal spray  Commonly known as:  OCEAN     tamsulosin 0.4 mg Cap  Commonly known as:  FLOMAX     thiamine 100 MG tablet  Take 1 tablet (100 mg total) by mouth daily.        STOP taking these medications    QUEtiapine 50 MG tablet  Commonly known as:  SEROquel     senna-docusate 8.6-50 mg tablet  Commonly known as:  PERICOLACE            HISTORY OF PRESENT ILLNESS: Rashad Caputo is a 69 y.o. male is being seen today for a face to face visit for dc to a LTC facility on 1/15/20. He has been at [facility] SINCE 9/1/19 and had been to acute care a few times during this stay for UTI as well as CHF. Last acute care stay was at [facility] from 12/9 to 12/21 2019 . Per his EMR record he was \" presenting with hypothermia, sepsis secondary to probable catheter associated urinary tract infection, dysarthria, acute diastolic heart failure     Septic shock: Etiology presumed secondary to catheter associated urinary tract infection with urine culture MSSA.  Blood cultures x3 no growth to date.  Required ICU admission and initial vasopressor and broad-spectrum IV antibiotic support.  Current septic shock resolved.     Catheter associated UTI with sepsis: Urine culture MSSA.  Patient refused to have Valentine catheter exchanged for new one as he would like to follow-up as outpatient with his urologist.  I have placed an order for referral for [facility] for him to be seen in the clinic within 7 days time to have catheter exchanged out for new one.  Counseled patient on the importance of exchanging catheter " and he acknowledged understanding was able to recite potential complications of not following recommendations of exchanging catheter out for a clean 1.  He still refused.  Patient will complete doxycycline as advised by infectious disease who have now signed off with last dose to be completed on 12/20/2019.     Hyperkalemia: Resolved.  Per nephrology likely multifactorial from elevated potassium intake, heparin, beta-blocker versus possible type IV RTA, versus possible adrenal insufficiency.  Recommend continued monitoring of potassium outside outpatient TCU.  Low potassium diet.  Nephrology has signed off.     Hypertension: Stable with blood pressure goal on carvedilol and Bumex.      Acute/chronic diastolic heart failure: Transthoracic echocardiogram 12/10/2019 EF 54%, normal right ventricular systolic function.  No significant valvular heart disease.  Patient treated with IV diuresis with a significant improvement and approximately 29 L of weight loss per last cardiology note on 12/16.  Patient refused stress testing.  Cardiology has signed off as no further recommendations.  He is to continue oral Bumex as prescribed at discharge.  Cardiac diet.  Recommend strict intake and output and daily weights at TCU.  If patient develops hypervolemia or weight gain such as more than 2 pounds in 24.  On the same scale would recommend TCU physician adjust dose of oral Bumex therapy.  Will place referral for CHF clinic follow-up as outpatient in discharge orders.       Possible adrenal insufficiency: ACTH level less than 10.  Cosyntropin stimulation test with predose cortisol level of 1.7, 30-minute post cosyntropin cortisol level 12.7 and 60-minute post cortisol level 15.5.  This would not suggest primary adrenal insufficiency and therefore I think would be unlikely to be a cause of his hyperkalemia.  However the low ACTH and cortisol levels could be possibly suggestive of some tertiary adrenal insufficiency.  He is on  hydrocortisone 5 mg twice daily for now and an endocrinology referral has been placed for patient to be seen in the outpatient clinic.  His response to cosyntropin going from 1.7 up to 15.5 seems to be a reasonable response although did not get to above 20.  His albumin level was 3.2.     Anxiety/mood disorder: On Seroquel, gabapentin.  Evaluated by psychiatry.  Deemed stable from psychiatric standpoint.  Patient refused to be started on any medications for anxiety.  Also declined mental health resources as outpatient.  Recommend close PCP follow-up and consideration of outpatient psychotherapy/psychiatry involvement if patient consents.     Dysarthria: Developed several days prior to arrival to hospital.  Was not a TPA candidate.  MRI of the brain was negative for recent infarct, intracranial mass, abnormal enhancement or evidence of intracranial hemorrhage.  Mild cerebral volume loss and presumed chronic small vessel ischemic changes noted.  Evaluated by neurology.  On aspirin.  Statin.  Suspected fluctuations in blood pressure in the setting of chronic Afrin therapy causing transient perfusion abnormalities contributing to symptoms.  On regular diet per speech therapy/pathology evaluations and patient meeting goals.     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  This is discontinued.  Flonase implemented.  Would avoid use of Afrin in future and patient counseled on the importance of this.  I am going to discontinue this at this time completely.     Left arm swelling: Chronic.  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  Similar.  Surgery consultation obtained who did not feel there was any evidence of septic left shoulder arthritis and no acute  "findings with chronic left anterior shoulder dislocation.  No activity restrictions per the recommendations.  Bilateral upper and lower extremity edema compressions advised.  Orthopedic surgery signed off.     Bilateral lower extremity edema: Venous duplex ultrasound negative for DVT.  Improved with diuresis.  Suspect secondary to diastolic heart failure.      Deconditioning: Patient currently Anshu lift.  Significant rehabilitation needed at this time.  TCU facility has accepted patient and given all consultants have signed off and patient otherwise stable no longer requiring active acute inpatient medical treatment stable for discharge today to transitional care unit.\"    He is now stable enough to dc to LTC setting.         SKILLED NURSING FACILITY COURSE:  During this TCU stay, patient completed all anticipated goals of therapy.      PHYSICAL EXAMINATION:    Vitals:    01/14/20 1617   BP: 124/73   Pulse: 94   Temp: 97  F (36.1  C)   Weight: 217 lb 6.4 oz (98.6 kg)         GENERAL: Awake, Alert, oriented x3, not in any form of acute distress, answers questions appropriately, follows simple commands, conversant  HEENT: Head is normocephalic with normal hair distribution. No evidence of trauma. Ears: No acute purulent discharge.Oral: Poor dentition, Eyes: Conjunctivae pink with no scleral jaundice. Nose: Normal mucosa and septum. NECK: Supple with no cervical or supraclavicular lymphadenopathy. Trachea is midline.   CHEST: No tenderness or deformity, no crepitus  LUNG: Clear to auscultation with good chest expansion. There are no crackles or wheezes, normal AP diameter.  BACK: No kyphosis of the thoracic spine. Symmetric, no curvature, ROM normal, no CVA tenderness, no spinal tenderness   CVS: There is good S1  S2, rhythm is regular.  ABDOMEN: Globular and soft, nontender to palpation, non distended, no masses, no organomegaly, good bowel sounds, no rebound or guarding, no peritoneal signs.   EXTREMITIES:   " Bilateral Lymphedema to LE,  arthritic  joint swelling. HO shoulder pain with limited ROM  SKIN: Warm and dry, no erythema noted, no rashes or lesions.  NEUROLOGICAL: The patient is oriented to person, place and time. Strength and sensation are grossly intact. Face is symmetric.      LABS:  All labs reviewed in the nursing home record.        DISCHARGE PLAN: I certify that this patient is under Dr. Le's care, seen by the NP, and had a face-to-face encounter that meets the physician face-to-face encounter requirements on 1/14/20 The encounter was in whole, or part related to the primary reason for home health.  The Patient is homebound due to: CHF and deconditioned state, and   it is  taxing and it will take a considerable amount of effort for patient to leave the home. He is dependent on others for transportation.  The patient is confined to his home and needs intermittent skilled nursing, PT,OT, RN, SW, and HHA.  The patient has been under the care of Dr. Le/NP and Dr. Le  initiated the establishment of the plan of care.        Patient to be followed by home care for physical therapy to eval and treat for strengthening, balance, endurance, and safety with mobility, and ambulation.  Patient to be followed by home care for occupational therapy to eval and treat for strengthening, ADL needs, adaptive equipment, and safety.  Patient to be followed by home care for nursing services for medication set up and teaching, symptom and disease processes monitoring and education.    Patient to be followed by home care for home health aid services for bathing and ADL needs.    Patient will follow up with PCP within 7- days after discharge for medication mangagment and appropriate lab studies.      Post Discharge Medication Reconciliation Status: discharge medications reconciled and changed, per note/orders (see AVS)        Electronically signed by:  Anuradha Barrett CNP  This progress note was completed using Dragon  software and there may be grammatical errors.      For documentation purposes, chart review, medication management, and discharge coordination of care was greater than 35 minutes

## 2021-06-28 NOTE — PROGRESS NOTES
Progress Notes by Anuradha Barrett CNP at 2019  9:59 AM     Author: Anuradha Barrett CNP Service: -- Author Type: Nurse Practitioner    Filed: 2019  6:44 PM Encounter Date: 2019 Status: Attested    : Anuradha Barrett CNP (Nurse Practitioner) Cosigner: Juana Le MBBS at 2019 11:51 AM    Attestation signed by Juana Le MBBS at 2019 11:51 AM    Agree with discharge plan                  [facility]      NAME:  Rashad Caputo             :  1950    MRN: [id number]    CODE STATUS:  FULL CODE    FACILITY: [facility] [[id number]]         CHIEF COMPLAIN/REASON FOR VISIT:  Chief Complaint   Patient presents with   ? Review Of Multiple Medical Conditions       HISTORY OF PRESENT ILLNESS: Rashad Caputo is a 69 y.o. male being seen for review of multiple medical conditions.. Patient presented to the hospital with acute urinary retention.  He had a Valentine catheter placed and currently discharged with an indwelling Valentine catheter.  He will follow with urology for a voiding trial. He was noted to have hematuria on , improved today. He developed a UTI cultures grew strep viridans.  He was given Levaquin 7 days post dischargey. Needs f/u   Today he reports poor sleeping at night.    Allergies   Allergen Reactions   ? Lisinopril Shortness Of Breath and Dizziness   ? Fosinopril Sodium Other (See Comments) and Dizziness     Mouth numbness   ? Amoxicillin-Pot Clavulanate Hives and Rash   ? Cephalosporins Hives and Rash   ? Penicillins Rash   :     Current Outpatient Medications   Medication Sig   ? melatonin 3 mg Tab tablet Take 5 mg by mouth at bedtime as needed.   ? acetaminophen (TYLENOL) 500 MG tablet Take 1-2 tablets (500-1,000 mg total) by mouth every 4 (four) hours as needed.   ? cyclobenzaprine (FLEXERIL) 10 MG tablet Take 1 tablet (10 mg total) by mouth 2 (two) times a day as needed for muscle spasms.   ? fluticasone propionate  (FLONASE) 50 mcg/actuation nasal spray Apply 1 spray into each nostril 3 (three) times a day as needed. For overuse of nasal decongestant.   ? magnesium oxide (MAG-OX) 400 mg (241.3 mg magnesium) tablet Take 400 mg by mouth daily.   ? naproxen sodium (ALEVE) 220 MG tablet Take 220 mg by mouth at bedtime.   ? phenylephrine (NICOLE-SYNEPHRINE) 1 % Spry 1-2 sprays into each nostril every 4 (four) hours as needed.          ? polyethylene glycol (MIRALAX) 17 gram packet Take 1 packet (17 g total) by mouth daily.   ? sodium chloride (OCEAN) 0.65 % nasal spray 1 spray into each nostril as needed for congestion. Use with phenylephrine spray.         REVIEW OF SYSTEMS:    Currently, no fever, chills, or rigors. Does not have any visual or hearing problems. Denies any chest pain, headaches, palpitations, lightheadedness, dizziness, shortness of breath, or cough. Appetite is good. Denies any GERD symptoms. Denies any difficulty with swallowing, nausea, or vomiting.  Denies any abdominal pain, was constipated now with loose stools d/t prune juice. Denies any urinary symptoms other then retention,. hs insomnia. No active bleeding. No rash.       PHYSICAL EXAMINATION:  Vitals:    09/20/19 1832   BP: 132/62   Pulse: 87   Temp: 98.2  F (36.8  C)   Weight: 178 lb 12.8 oz (81.1 kg)         GENERAL: Awake, Alert, oriented x3,unkempt in appearance,  not in any form of acute distress, answers questions appropriately, follows simple commands, conversant  HEENT: Head is normocephalic with normal hair distribution. No evidence of trauma. Ears: No acute purulent discharge. Eyes: Conjunctivae pink with no scleral jaundice. Nose: Normal mucosa and septum. NECK: Supple with no cervical or supraclavicular lymphadenopathy. Trachea is midline.   CHEST: No tenderness or deformity, no crepitus  LUNG: Clear to auscultation with good chest expansion. There are no crackles or wheezes, normal AP diameter.  BACK: No kyphosis of the thoracic spine.  Symmetric, no curvature, ROM normal, no CVA tenderness, no spinal tenderness   CVS: There is good S1  S2,  rhythm is regular.  ABDOMEN: Globular and soft, nontender to palpation, non distended, no masses, no organomegaly, good bowel sounds, no rebound or guarding, no peritoneal signs.   EXTREMITIES: ROM UE WNL, he is unable to bear wt on legs. In wc. Pedal pulses present, no edema, arthritic aged joint swelling, right knee very edematous. .  SKIN: Warm and dry, no erythema noted, no rashes or lesions.  NEUROLOGICAL: The patient is oriented to person, place and time. Strength and sensation are grossly intact. Face is symmetric.                  LABS:    Lab Results   Component Value Date    WBC 7.3 09/08/2019    HGB 11.4 (L) 09/08/2019    HCT 35.2 (L) 09/08/2019    MCV 82 09/08/2019     09/08/2019       Results for orders placed or performed during the hospital encounter of 09/08/19   Basic Metabolic Panel   Result Value Ref Range    Sodium 136 136 - 145 mmol/L    Potassium 4.6 3.5 - 5.0 mmol/L    Chloride 104 98 - 107 mmol/L    CO2 25 22 - 31 mmol/L    Anion Gap, Calculation 7 5 - 18 mmol/L    Glucose 101 70 - 125 mg/dL    Calcium 9.3 8.5 - 10.5 mg/dL    BUN 22 8 - 22 mg/dL    Creatinine 0.65 (L) 0.70 - 1.30 mg/dL    GFR MDRD Af Amer >60 >60 mL/min/1.73m2    GFR MDRD Non Af Amer >60 >60 mL/min/1.73m2           No results found for: HGBA1C  No results found for: [id number]  No results found for: [id number]    ASSESSMENT/PLAN:  1. Urinary retention    2. Insomnia, unspecified type      1. Retention and. Urinary obstruction/ sepsis : Valentine in place, hematuria has disapated Encouraged fluid and request nursing assure he has a secure strap in place.H e has  F/U was cancelled. He is now completed ABX regime, we will recheck UA d/t hematuria    2.Insomnia : Start Melatonin 5 mg po  at bedtime.      Electronically signed by:  Anuradha Barrett CNP  This progress note was completed using Dragon software and there  may be grammatical errors.